# Patient Record
Sex: FEMALE | Race: OTHER | Employment: OTHER | URBAN - METROPOLITAN AREA
[De-identification: names, ages, dates, MRNs, and addresses within clinical notes are randomized per-mention and may not be internally consistent; named-entity substitution may affect disease eponyms.]

---

## 2019-01-28 ENCOUNTER — APPOINTMENT (OUTPATIENT)
Dept: LAB | Age: 69
End: 2019-01-28
Attending: INTERNAL MEDICINE
Payer: MEDICARE

## 2019-01-28 ENCOUNTER — OFFICE VISIT (OUTPATIENT)
Dept: INTERNAL MEDICINE CLINIC | Facility: CLINIC | Age: 69
End: 2019-01-28
Payer: MEDICARE

## 2019-01-28 VITALS
RESPIRATION RATE: 12 BRPM | HEART RATE: 70 BPM | TEMPERATURE: 98 F | WEIGHT: 147 LBS | OXYGEN SATURATION: 94 % | DIASTOLIC BLOOD PRESSURE: 80 MMHG | SYSTOLIC BLOOD PRESSURE: 160 MMHG | BODY MASS INDEX: 29.64 KG/M2 | HEIGHT: 59 IN

## 2019-01-28 DIAGNOSIS — E11.69 MIXED HYPERLIPIDEMIA DUE TO TYPE 2 DIABETES MELLITUS (HCC): ICD-10-CM

## 2019-01-28 DIAGNOSIS — H93.13 TINNITUS AURIUM, BILATERAL: ICD-10-CM

## 2019-01-28 DIAGNOSIS — Z13.5 SCREENING FOR DIABETIC RETINOPATHY: ICD-10-CM

## 2019-01-28 DIAGNOSIS — Z79.4 INSULIN DEPENDENT TYPE 2 DIABETES MELLITUS, UNCONTROLLED (HCC): ICD-10-CM

## 2019-01-28 DIAGNOSIS — F32.1 CURRENT MODERATE EPISODE OF MAJOR DEPRESSIVE DISORDER WITHOUT PRIOR EPISODE (HCC): ICD-10-CM

## 2019-01-28 DIAGNOSIS — I15.2 HYPERTENSION ASSOCIATED WITH DIABETES (HCC): ICD-10-CM

## 2019-01-28 DIAGNOSIS — E11.59 HYPERTENSION ASSOCIATED WITH DIABETES (HCC): ICD-10-CM

## 2019-01-28 DIAGNOSIS — E11.65 INSULIN DEPENDENT TYPE 2 DIABETES MELLITUS, UNCONTROLLED (HCC): ICD-10-CM

## 2019-01-28 DIAGNOSIS — E78.2 MIXED HYPERLIPIDEMIA DUE TO TYPE 2 DIABETES MELLITUS (HCC): ICD-10-CM

## 2019-01-28 DIAGNOSIS — Z79.4 INSULIN DEPENDENT TYPE 2 DIABETES MELLITUS, UNCONTROLLED (HCC): Primary | ICD-10-CM

## 2019-01-28 DIAGNOSIS — E11.65 INSULIN DEPENDENT TYPE 2 DIABETES MELLITUS, UNCONTROLLED (HCC): Primary | ICD-10-CM

## 2019-01-28 PROBLEM — E66.3 OVERWEIGHT (BMI 25.0-29.9): Status: ACTIVE | Noted: 2019-01-28

## 2019-01-28 PROBLEM — IMO0002 INSULIN DEPENDENT TYPE 2 DIABETES MELLITUS, UNCONTROLLED: Status: ACTIVE | Noted: 2019-01-28

## 2019-01-28 PROBLEM — K76.0 NAFLD (NONALCOHOLIC FATTY LIVER DISEASE): Status: ACTIVE | Noted: 2019-01-28

## 2019-01-28 LAB
ALT SERPL-CCNC: 41 U/L (ref 14–54)
ANION GAP SERPL CALC-SCNC: 9 MMOL/L (ref 0–18)
AST SERPL-CCNC: 42 U/L (ref 15–41)
BUN BLD-MCNC: 14 MG/DL (ref 8–20)
BUN/CREAT SERPL: 17.1 (ref 10–20)
CALCIUM BLD-MCNC: 9.5 MG/DL (ref 8.3–10.3)
CARTRIDGE LOT#: 931 NUMERIC
CHLORIDE SERPL-SCNC: 104 MMOL/L (ref 101–111)
CHOLEST SMN-MCNC: 227 MG/DL (ref ?–200)
CO2 SERPL-SCNC: 24 MMOL/L (ref 22–32)
CREAT BLD-MCNC: 0.82 MG/DL (ref 0.55–1.02)
CREAT UR-SCNC: 29.8 MG/DL
EST. AVERAGE GLUCOSE BLD GHB EST-MCNC: 235 MG/DL (ref 68–126)
GLUCOSE BLD-MCNC: 295 MG/DL (ref 70–99)
HBA1C MFR BLD HPLC: 9.8 % (ref ?–5.7)
HDLC SERPL-MCNC: 63 MG/DL (ref 40–59)
HEMOGLOBIN A1C: 9.2 % (ref 4.3–5.6)
LDLC SERPL CALC-MCNC: 105 MG/DL (ref ?–100)
MICROALBUMIN UR-MCNC: 33.3 MG/DL
MICROALBUMIN/CREAT 24H UR-RTO: 1117.4 UG/MG (ref ?–30)
NONHDLC SERPL-MCNC: 164 MG/DL (ref ?–130)
OSMOLALITY SERPL CALC.SUM OF ELEC: 295 MOSM/KG (ref 275–295)
POTASSIUM SERPL-SCNC: 4 MMOL/L (ref 3.6–5.1)
SODIUM SERPL-SCNC: 137 MMOL/L (ref 136–144)
T4 FREE SERPL-MCNC: 1 NG/DL (ref 0.9–1.8)
TRIGL SERPL-MCNC: 294 MG/DL (ref 30–149)
TSI SER-ACNC: 2.72 MIU/ML (ref 0.35–5.5)
VLDLC SERPL CALC-MCNC: 59 MG/DL (ref 0–30)

## 2019-01-28 PROCEDURE — 84460 ALANINE AMINO (ALT) (SGPT): CPT

## 2019-01-28 PROCEDURE — 80061 LIPID PANEL: CPT

## 2019-01-28 PROCEDURE — 84450 TRANSFERASE (AST) (SGOT): CPT

## 2019-01-28 PROCEDURE — 80048 BASIC METABOLIC PNL TOTAL CA: CPT

## 2019-01-28 PROCEDURE — 83036 HEMOGLOBIN GLYCOSYLATED A1C: CPT

## 2019-01-28 PROCEDURE — 84439 ASSAY OF FREE THYROXINE: CPT

## 2019-01-28 PROCEDURE — 82043 UR ALBUMIN QUANTITATIVE: CPT

## 2019-01-28 PROCEDURE — 99204 OFFICE O/P NEW MOD 45 MIN: CPT | Performed by: INTERNAL MEDICINE

## 2019-01-28 PROCEDURE — 84443 ASSAY THYROID STIM HORMONE: CPT

## 2019-01-28 PROCEDURE — 83036 HEMOGLOBIN GLYCOSYLATED A1C: CPT | Performed by: INTERNAL MEDICINE

## 2019-01-28 PROCEDURE — 82570 ASSAY OF URINE CREATININE: CPT

## 2019-01-28 PROCEDURE — 36415 COLL VENOUS BLD VENIPUNCTURE: CPT

## 2019-01-28 RX ORDER — ATORVASTATIN CALCIUM 20 MG/1
20 TABLET, FILM COATED ORAL DAILY
COMMUNITY
End: 2019-02-22

## 2019-01-28 NOTE — PATIENT INSTRUCTIONS
Por favor zora a la Ayah. Gwen Ahmed para un examen de la vista para diabéticos. Por favor complete los laboratorios de ayuno naylor pronto sari sea posible.     Para thompson depresión, comenzaremos a duane sertralina 50 mg (media tableta) diariamente pal 7

## 2019-01-28 NOTE — PROGRESS NOTES
Mary Dumont is a 80year old female. HPI:   Patient presents for new patietn visit. She was following with PCP Raj Caro. Comes in with her daughter, Mildred Santiago. Mildred Santiago lives with patient.  30 minutes into visit, Chuy Lacy left and daughter Concepcion Bassett unspecified type diabetes mellitus without mention of complication, not stated as uncontrolled    • Unspecified essential hypertension       Past Surgical History:   Procedure Laterality Date   • HYSTERECTOMY  1998      Social History:    Social History uncontrolled. If still elevated at next visit, I will titrate up on her losartan  # HLP: continue atorvastatin, check lipids now  # Moderate MDD: discussed counseling, lifestyle interventions, and sertraline. Side effects/benefits of medication discussed.

## 2019-02-04 ENCOUNTER — TELEPHONE (OUTPATIENT)
Dept: INTERNAL MEDICINE CLINIC | Facility: CLINIC | Age: 69
End: 2019-02-04

## 2019-02-05 NOTE — TELEPHONE ENCOUNTER
Does she want me to order [facility] [place] and metformin as separate tablets or janumet (combination tablet)? I am not sure if her insurance will cover the combination but we can try to send.

## 2019-02-06 NOTE — TELEPHONE ENCOUNTER
Pharm called for clarification on med Lancets. Pharm advised that they called last week about same med. Please call to discuss.

## 2019-02-15 ENCOUNTER — TELEPHONE (OUTPATIENT)
Dept: INTERNAL MEDICINE CLINIC | Facility: CLINIC | Age: 69
End: 2019-02-15

## 2019-02-22 RX ORDER — BLOOD SUGAR DIAGNOSTIC
STRIP MISCELLANEOUS
Qty: 300 STRIP | Refills: 3 | Status: SHIPPED | OUTPATIENT
Start: 2019-02-22 | End: 2019-08-09

## 2019-02-22 RX ORDER — BLOOD GLUCOSE CONTROL HIGH,LOW
EACH MISCELLANEOUS
Qty: 1 EACH | Refills: 0 | Status: SHIPPED | OUTPATIENT
Start: 2019-02-22

## 2019-02-22 RX ORDER — LANCETS
1 EACH MISCELLANEOUS 3 TIMES DAILY
Qty: 300 EACH | Refills: 3 | Status: SHIPPED | OUTPATIENT
Start: 2019-02-22 | End: 2019-06-10

## 2019-02-22 RX ORDER — BLOOD-GLUCOSE METER
1 EACH MISCELLANEOUS 2 TIMES DAILY
Qty: 1 KIT | Refills: 0 | Status: SHIPPED | OUTPATIENT
Start: 2019-02-22 | End: 2020-02-22

## 2019-02-22 RX ORDER — ATORVASTATIN CALCIUM 20 MG/1
20 TABLET, FILM COATED ORAL DAILY
Qty: 90 TABLET | Refills: 0 | Status: SHIPPED | OUTPATIENT
Start: 2019-02-22 | End: 2019-08-09

## 2019-02-22 NOTE — TELEPHONE ENCOUNTER
Protocol passed.      Medication(s) to Refill:   Requested Prescriptions     Pending Prescriptions Disp Refills   • Blood Glucose Monitoring Suppl (ACCU-CHEK MAME PLUS) w/Device Does not apply Kit 1 kit 0     Si Device by Other route 2 (two) times slim CHOLEST 227 (H) 01/28/2019    TRIG 294 (H) 01/28/2019    HDL 63 (H) 01/28/2019     (H) 01/28/2019    VLDL 59 (H) 01/28/2019    TCHDLRATIO 3.7 08/14/2013    NONHDLC 164 (H) 01/28/2019    CHOLHDLRATIO 3.4 07/18/2011

## 2019-02-27 PROBLEM — E11.3519 PROLIFERATIVE DIABETIC RETINOPATHY WITH MACULAR EDEMA ASSOCIATED WITH TYPE 2 DIABETES MELLITUS (HCC): Status: ACTIVE | Noted: 2019-02-27

## 2019-03-01 DIAGNOSIS — E11.3511 DIABETIC MACULAR EDEMA OF RIGHT EYE WITH PROLIFERATIVE RETINOPATHY ASSOCIATED WITH TYPE 2 DIABETES MELLITUS (HCC): Primary | ICD-10-CM

## 2019-03-01 DIAGNOSIS — H17.89 OTHER CORNEAL SCARS AND OPACITIES: ICD-10-CM

## 2019-03-01 DIAGNOSIS — H43.13 VITREOUS HEMORRHAGE OF BOTH EYES (HCC): ICD-10-CM

## 2019-03-05 ENCOUNTER — TELEPHONE (OUTPATIENT)
Dept: INTERNAL MEDICINE CLINIC | Facility: CLINIC | Age: 69
End: 2019-03-05

## 2019-03-05 NOTE — TELEPHONE ENCOUNTER
Andres Hernandez plus was sent to them but need to verify instructions for this because they received 2

## 2019-05-03 NOTE — TELEPHONE ENCOUNTER
NARGIS [address] [place] [address] [address] [place], [place] - [address] [phone], [phone]

## 2019-05-06 NOTE — TELEPHONE ENCOUNTER
Patient was supposed to see me in 3/2019. She was a No SHOW. I had blocked her for 1 hour because she is an uncontrolled diabetic and needs medicare wellness exam. Please ask them to schedule.  If she No Shows me again, I will not allow her to see me in fut

## 2019-05-06 NOTE — TELEPHONE ENCOUNTER
Refill requested:   Requested Prescriptions     Pending Prescriptions Disp Refills   • SITagliptin-metFORMIN HCl (DOEUMET)  MG Oral Tab 180 tablet 0     Sig: Take 1 tablet by mouth 2 (two) times daily with meals.    Failed protocol  Diabetic Medicati

## 2019-05-10 NOTE — TELEPHONE ENCOUNTER
Pt would like a call back from nurse states that she is still a pt here and would like a refill on JANUMET

## 2019-05-13 ENCOUNTER — TELEPHONE (OUTPATIENT)
Dept: INTERNAL MEDICINE CLINIC | Facility: CLINIC | Age: 69
End: 2019-05-13

## 2019-05-13 DIAGNOSIS — E11.65 INSULIN DEPENDENT TYPE 2 DIABETES MELLITUS, UNCONTROLLED (HCC): Primary | ICD-10-CM

## 2019-05-13 DIAGNOSIS — Z79.4 INSULIN DEPENDENT TYPE 2 DIABETES MELLITUS, UNCONTROLLED (HCC): Primary | ICD-10-CM

## 2019-05-16 NOTE — TELEPHONE ENCOUNTER
Patient's Daughter, Ramon Bean, calling in requesting samples for Janument . She stated the RX was sent to the [facility] and it will take a week for delivery.       Please callNatalie Armando @ [phone]

## 2019-06-10 ENCOUNTER — APPOINTMENT (OUTPATIENT)
Dept: LAB | Age: 69
End: 2019-06-10
Attending: INTERNAL MEDICINE
Payer: MEDICARE

## 2019-06-10 ENCOUNTER — OFFICE VISIT (OUTPATIENT)
Dept: INTERNAL MEDICINE CLINIC | Facility: CLINIC | Age: 69
End: 2019-06-10
Payer: MEDICARE

## 2019-06-10 VITALS
BODY MASS INDEX: 29.52 KG/M2 | HEIGHT: 58.5 IN | RESPIRATION RATE: 12 BRPM | DIASTOLIC BLOOD PRESSURE: 70 MMHG | HEART RATE: 78 BPM | WEIGHT: 144.5 LBS | TEMPERATURE: 98 F | SYSTOLIC BLOOD PRESSURE: 142 MMHG | OXYGEN SATURATION: 99 %

## 2019-06-10 DIAGNOSIS — Z12.11 SCREEN FOR COLON CANCER: ICD-10-CM

## 2019-06-10 DIAGNOSIS — E11.65 UNCONTROLLED TYPE 2 DIABETES MELLITUS WITH HYPERGLYCEMIA (HCC): ICD-10-CM

## 2019-06-10 DIAGNOSIS — M94.9 DISORDER OF BONE AND CARTILAGE: ICD-10-CM

## 2019-06-10 DIAGNOSIS — E11.65 UNCONTROLLED TYPE 2 DIABETES MELLITUS WITH HYPERGLYCEMIA (HCC): Primary | ICD-10-CM

## 2019-06-10 DIAGNOSIS — K76.0 NAFLD (NONALCOHOLIC FATTY LIVER DISEASE): ICD-10-CM

## 2019-06-10 DIAGNOSIS — E78.2 MIXED HYPERLIPIDEMIA DUE TO TYPE 2 DIABETES MELLITUS (HCC): ICD-10-CM

## 2019-06-10 DIAGNOSIS — E11.59 HYPERTENSION ASSOCIATED WITH DIABETES (HCC): ICD-10-CM

## 2019-06-10 DIAGNOSIS — M89.9 DISORDER OF BONE AND CARTILAGE: ICD-10-CM

## 2019-06-10 DIAGNOSIS — E11.65 INSULIN DEPENDENT TYPE 2 DIABETES MELLITUS, UNCONTROLLED (HCC): ICD-10-CM

## 2019-06-10 DIAGNOSIS — I15.2 HYPERTENSION ASSOCIATED WITH DIABETES (HCC): ICD-10-CM

## 2019-06-10 DIAGNOSIS — E11.69 MIXED HYPERLIPIDEMIA DUE TO TYPE 2 DIABETES MELLITUS (HCC): ICD-10-CM

## 2019-06-10 DIAGNOSIS — Z79.4 INSULIN DEPENDENT TYPE 2 DIABETES MELLITUS, UNCONTROLLED (HCC): ICD-10-CM

## 2019-06-10 DIAGNOSIS — E11.3513 PROLIFERATIVE DIABETIC RETINOPATHY OF BOTH EYES WITH MACULAR EDEMA ASSOCIATED WITH TYPE 2 DIABETES MELLITUS (HCC): ICD-10-CM

## 2019-06-10 DIAGNOSIS — E66.3 OVERWEIGHT (BMI 25.0-29.9): ICD-10-CM

## 2019-06-10 DIAGNOSIS — Z00.00 ROUTINE GENERAL MEDICAL EXAMINATION AT A HEALTH CARE FACILITY: ICD-10-CM

## 2019-06-10 DIAGNOSIS — Z12.39 SCREENING FOR BREAST CANCER: ICD-10-CM

## 2019-06-10 DIAGNOSIS — F39 MOOD DISORDER (HCC): ICD-10-CM

## 2019-06-10 PROBLEM — E11.29 DIABETES MELLITUS WITH ALBUMINURIA (HCC): Status: ACTIVE | Noted: 2019-01-28

## 2019-06-10 PROBLEM — R80.9 DIABETES MELLITUS WITH ALBUMINURIA  (HCC): Status: ACTIVE | Noted: 2019-01-28

## 2019-06-10 PROBLEM — E11.29 DIABETES MELLITUS WITH ALBUMINURIA  (HCC): Status: ACTIVE | Noted: 2019-01-28

## 2019-06-10 PROBLEM — R80.9 DIABETES MELLITUS WITH ALBUMINURIA: Status: ACTIVE | Noted: 2019-01-28

## 2019-06-10 PROBLEM — R80.9 DIABETES MELLITUS WITH ALBUMINURIA (HCC): Status: ACTIVE | Noted: 2019-01-28

## 2019-06-10 PROBLEM — E11.29 DIABETES MELLITUS WITH ALBUMINURIA: Status: ACTIVE | Noted: 2019-01-28

## 2019-06-10 PROBLEM — IMO0002 UNCONTROLLED TYPE 2 DIABETES MELLITUS: Status: ACTIVE | Noted: 2019-06-10

## 2019-06-10 PROCEDURE — 36415 COLL VENOUS BLD VENIPUNCTURE: CPT

## 2019-06-10 PROCEDURE — 83036 HEMOGLOBIN GLYCOSYLATED A1C: CPT | Performed by: INTERNAL MEDICINE

## 2019-06-10 PROCEDURE — G0402 INITIAL PREVENTIVE EXAM: HCPCS | Performed by: INTERNAL MEDICINE

## 2019-06-10 PROCEDURE — 86803 HEPATITIS C AB TEST: CPT

## 2019-06-10 PROCEDURE — 99397 PER PM REEVAL EST PAT 65+ YR: CPT | Performed by: INTERNAL MEDICINE

## 2019-06-10 PROCEDURE — 80076 HEPATIC FUNCTION PANEL: CPT

## 2019-06-10 PROCEDURE — 96160 PT-FOCUSED HLTH RISK ASSMT: CPT | Performed by: INTERNAL MEDICINE

## 2019-06-10 PROCEDURE — 80048 BASIC METABOLIC PNL TOTAL CA: CPT

## 2019-06-10 RX ORDER — LOSARTAN POTASSIUM 100 MG/1
100 TABLET ORAL DAILY
Qty: 90 TABLET | Refills: 3 | Status: SHIPPED | OUTPATIENT
Start: 2019-06-10 | End: 2019-08-09

## 2019-06-10 RX ORDER — LANCETS
1 EACH MISCELLANEOUS 3 TIMES DAILY
Qty: 300 EACH | Refills: 11 | Status: SHIPPED | OUTPATIENT
Start: 2019-06-10 | End: 2020-06-09

## 2019-06-10 RX ORDER — PEN NEEDLE, DIABETIC 30 GX3/16"
1 NEEDLE, DISPOSABLE MISCELLANEOUS DAILY
Qty: 90 EACH | Refills: 0 | Status: SHIPPED | OUTPATIENT
Start: 2019-06-10 | End: 2019-06-13 | Stop reason: CLARIF

## 2019-06-10 NOTE — PROGRESS NOTES
Kathy Juares is a 80year old female. HPI:   Patient presents for medicare supervisit and additional issues noted below. Comes in with daughter, Charly Castillo. 1. Uncontrolled DM: she does not exercise.  She is using insulin 22-40 units daily depending o SURGICAL HISTORY Right     right knee repair but not a replacement      Social History:    Social History    Tobacco Use      Smoking status: Never Smoker      Smokeless tobacco: Never Used    Alcohol use: Yes      Comment: 1-2 drinks per month    Drug use edema b/l: following with ophtho, needs improved glycemic control  # HTN in T2 DM: increase losartan to 100 mg dialy   # HLP: continue atorvastatin, LDL slightly above goal. Consider increasing if not improving with set of next labs.    # Mood Disorder: aga

## 2019-06-10 NOTE — PATIENT INSTRUCTIONS
Le aconsejo que se aplique la vacuna TDAP (tétanos, difteria, pertusis) cada 10 años, shea puede costar [employer] 65. También te aconsejo que te pongas la vacuna [employer] shamir vez en la megan.  [place]inson es NUEVO y se considera mejor que la vacuna contra la culeb

## 2019-06-13 ENCOUNTER — TELEPHONE (OUTPATIENT)
Dept: INTERNAL MEDICINE CLINIC | Facility: CLINIC | Age: 69
End: 2019-06-13

## 2019-06-13 RX ORDER — ELECTROLYTES/DEXTROSE
SOLUTION, ORAL ORAL
Qty: 100 EACH | Refills: 0 | Status: SHIPPED | OUTPATIENT
Start: 2019-06-13 | End: 2020-04-09

## 2019-06-18 ENCOUNTER — TELEPHONE (OUTPATIENT)
Dept: INTERNAL MEDICINE CLINIC | Facility: CLINIC | Age: 69
End: 2019-06-18

## 2019-06-18 NOTE — TELEPHONE ENCOUNTER
Patient's daughter dropped off sheet from [address] [place] [address] MD ph [phone] fax [phone].   Patient needs referral for this specialist see testing sheet/MA fax folder

## 2019-06-19 NOTE — TELEPHONE ENCOUNTER
Referral placed and authorized through pts insurance. Faxed referral to Dr Jeremiah Bernal office at [phone] and confirmation received.

## 2019-07-30 NOTE — TELEPHONE ENCOUNTER
JANUMET  MG     Last OV relevant to medication: 6/10/2019     Last refill date: 5/16/2019    When pt was asked to return for OV: 1 month     Upcoming appt/reason: none     Labs: 6- ( hemoglobin a1c, bmp, hcv )       ASSESSMENT AND PLAN:   # U

## 2019-07-31 RX ORDER — SITAGLIPTIN AND METFORMIN HYDROCHLORIDE 50; 1000 MG/1; MG/1
TABLET, FILM COATED ORAL
Qty: 180 TABLET | Refills: 0 | OUTPATIENT
Start: 2019-07-31

## 2019-08-01 NOTE — TELEPHONE ENCOUNTER
Patient scheduled appointment.  Requesting refill prior to then    Future Appointments   Date Time Provider Kwan Carmichael   8/9/2019  9:30 AM Junior Hernandez MD [facility] [place]

## 2019-08-09 ENCOUNTER — OFFICE VISIT (OUTPATIENT)
Dept: INTERNAL MEDICINE CLINIC | Facility: CLINIC | Age: 69
End: 2019-08-09
Payer: MEDICARE

## 2019-08-09 VITALS
WEIGHT: 143.25 LBS | OXYGEN SATURATION: 96 % | SYSTOLIC BLOOD PRESSURE: 130 MMHG | RESPIRATION RATE: 14 BRPM | HEART RATE: 74 BPM | TEMPERATURE: 98 F | BODY MASS INDEX: 29.27 KG/M2 | HEIGHT: 58.5 IN | DIASTOLIC BLOOD PRESSURE: 62 MMHG

## 2019-08-09 DIAGNOSIS — E11.69 HYPERLIPIDEMIA ASSOCIATED WITH TYPE 2 DIABETES MELLITUS (HCC): ICD-10-CM

## 2019-08-09 DIAGNOSIS — E11.65 UNCONTROLLED TYPE 2 DIABETES MELLITUS WITH HYPERGLYCEMIA (HCC): Primary | ICD-10-CM

## 2019-08-09 DIAGNOSIS — E11.59 HYPERTENSION ASSOCIATED WITH DIABETES (HCC): ICD-10-CM

## 2019-08-09 DIAGNOSIS — R01.1 SYSTOLIC MURMUR: ICD-10-CM

## 2019-08-09 DIAGNOSIS — E78.5 HYPERLIPIDEMIA ASSOCIATED WITH TYPE 2 DIABETES MELLITUS (HCC): ICD-10-CM

## 2019-08-09 DIAGNOSIS — I15.2 HYPERTENSION ASSOCIATED WITH DIABETES (HCC): ICD-10-CM

## 2019-08-09 PROCEDURE — 99214 OFFICE O/P EST MOD 30 MIN: CPT | Performed by: INTERNAL MEDICINE

## 2019-08-09 RX ORDER — TIZANIDINE 4 MG/1
TABLET ORAL
COMMUNITY
End: 2019-08-09

## 2019-08-09 RX ORDER — PEN NEEDLE, DIABETIC 29 G X1/2"
NEEDLE, DISPOSABLE MISCELLANEOUS
Refills: 11 | COMMUNITY
Start: 2019-06-10 | End: 2019-08-09

## 2019-08-09 RX ORDER — OXYCODONE AND ACETAMINOPHEN 10; 325 MG/1; MG/1
TABLET ORAL
COMMUNITY
End: 2019-08-09

## 2019-08-09 RX ORDER — ATORVASTATIN CALCIUM 20 MG/1
20 TABLET, FILM COATED ORAL DAILY
Qty: 90 TABLET | Refills: 3 | Status: SHIPPED | OUTPATIENT
Start: 2019-08-09 | End: 2020-12-11

## 2019-08-09 RX ORDER — LOSARTAN POTASSIUM 100 MG/1
100 TABLET ORAL DAILY
Qty: 90 TABLET | Refills: 3 | Status: SHIPPED | OUTPATIENT
Start: 2019-08-09 | End: 2020-12-11

## 2019-08-09 RX ORDER — MELOXICAM 15 MG/1
TABLET ORAL
COMMUNITY
End: 2019-08-09

## 2019-08-09 RX ORDER — TRIAMCINOLONE ACETONIDE 0.25 MG/G
OINTMENT TOPICAL
COMMUNITY
End: 2019-08-09

## 2019-08-09 RX ORDER — BLOOD SUGAR DIAGNOSTIC
STRIP MISCELLANEOUS
Qty: 300 STRIP | Refills: 11 | Status: SHIPPED | OUTPATIENT
Start: 2019-08-09 | End: 2020-04-30

## 2019-08-09 RX ORDER — LOSARTAN POTASSIUM 50 MG/1
TABLET ORAL
COMMUNITY
End: 2019-08-09

## 2019-08-09 RX ORDER — ATORVASTATIN CALCIUM 20 MG/1
TABLET, FILM COATED ORAL
COMMUNITY
End: 2019-08-09

## 2019-08-09 NOTE — PATIENT INSTRUCTIONS
Reanude toujeo insulin 30 [employer] al día. Llame a la programación central al [phone]. He ordenado shamir ecografía de thompson corazón porque hoy escuché un murmullo pal thompson examen.     Mida thompson nivel de azúcar en michelle en ayunas (en la mañ

## 2019-08-09 NOTE — PROGRESS NOTES
Buddy Rodney is a 77year old female. HPI:   Patient presents for uncontrolled DM.  ID [id number]  1. DM: has not used toujeou since7/23. She forgot to take needles to inject herself while in [facility]. She was in [place] from 7/14-7/27th.  She has a replacement      Social History:    Social History    Tobacco Use      Smoking status: Never Smoker      Smokeless tobacco: Never Used    Alcohol use: Yes      Comment: 1-2 drinks per month    Drug use: No            EXAM:   /62   Pulse 74   Temp 9 HLP:  Atorvastatin refilled, LDL slightly above goal. Consider increasing if not improving with set of next labs. # Mood Disorder: again discussed counseling, lifestyle interventions, medications.  She declines both counseling and medications at this time

## 2019-08-16 ENCOUNTER — APPOINTMENT (OUTPATIENT)
Dept: LAB | Age: 69
End: 2019-08-16
Attending: INTERNAL MEDICINE
Payer: MEDICARE

## 2019-08-16 DIAGNOSIS — E11.65 INSULIN DEPENDENT TYPE 2 DIABETES MELLITUS, UNCONTROLLED (HCC): ICD-10-CM

## 2019-08-16 DIAGNOSIS — Z79.4 INSULIN DEPENDENT TYPE 2 DIABETES MELLITUS, UNCONTROLLED (HCC): ICD-10-CM

## 2019-08-16 LAB
EST. AVERAGE GLUCOSE BLD GHB EST-MCNC: 223 MG/DL (ref 68–126)
HBA1C MFR BLD HPLC: 9.4 % (ref ?–5.7)

## 2019-08-16 PROCEDURE — 36415 COLL VENOUS BLD VENIPUNCTURE: CPT

## 2019-08-16 PROCEDURE — 83036 HEMOGLOBIN GLYCOSYLATED A1C: CPT

## 2019-08-18 DIAGNOSIS — R80.9 DIABETES MELLITUS WITH ALBUMINURIA (HCC): ICD-10-CM

## 2019-08-18 DIAGNOSIS — E11.65 INSULIN DEPENDENT TYPE 2 DIABETES MELLITUS, UNCONTROLLED (HCC): ICD-10-CM

## 2019-08-18 DIAGNOSIS — E11.29 DIABETES MELLITUS WITH ALBUMINURIA (HCC): ICD-10-CM

## 2019-08-18 DIAGNOSIS — Z79.4 INSULIN DEPENDENT TYPE 2 DIABETES MELLITUS, UNCONTROLLED (HCC): ICD-10-CM

## 2019-08-18 DIAGNOSIS — E11.65 UNCONTROLLED TYPE 2 DIABETES MELLITUS WITH HYPERGLYCEMIA (HCC): Primary | ICD-10-CM

## 2019-08-20 ENCOUNTER — TELEPHONE (OUTPATIENT)
Dept: INTERNAL MEDICINE CLINIC | Facility: CLINIC | Age: 69
End: 2019-08-20

## 2019-08-20 NOTE — TELEPHONE ENCOUNTER
Patient dropped off blood sugar readings. Dr. Brock Harley states the followin. Have patient up her Toujeo by 2 units in am, and pm until her FBS avg is less than 140.    2. Make sure she schedules with Deisy Carbajal.     Patient is Romanian speaking

## 2019-08-27 ENCOUNTER — HOSPITAL ENCOUNTER (OUTPATIENT)
Dept: MAMMOGRAPHY | Age: 69
Discharge: HOME | End: 2019-08-27
Attending: INTERNAL MEDICINE
Payer: MEDICARE

## 2019-08-27 ENCOUNTER — HOSPITAL ENCOUNTER (OUTPATIENT)
Dept: CV DIAGNOSTICS | Age: 69
Discharge: HOME | End: 2019-08-27
Attending: INTERNAL MEDICINE
Payer: MEDICARE

## 2019-08-27 DIAGNOSIS — Z12.39 SCREENING FOR BREAST CANCER: ICD-10-CM

## 2019-08-27 DIAGNOSIS — R01.1 SYSTOLIC MURMUR: ICD-10-CM

## 2019-08-27 PROCEDURE — 93306 TTE W/DOPPLER COMPLETE: CPT | Performed by: INTERNAL MEDICINE

## 2019-08-27 PROCEDURE — 77063 BREAST TOMOSYNTHESIS BI: CPT | Performed by: INTERNAL MEDICINE

## 2019-08-27 PROCEDURE — 77067 SCR MAMMO BI INCL CAD: CPT | Performed by: INTERNAL MEDICINE

## 2019-09-24 NOTE — TELEPHONE ENCOUNTER
insulin glargine (TOUJEO SOLOSTAR) 300 UNIT/ML Subcutaneous Solution Pen-injector    Last OV relevant to medication: 8/9/2019    Last refill date: 2/22/2019 # 3 ml with 0 refills     When pt was asked to return for OV: 6 weeks     Upcoming appt/reason: non

## 2019-09-24 NOTE — TELEPHONE ENCOUNTER
Pt's daughter informed Misty Wise per HIPPA) and verbalized understanding by witting instructions. Diabetes specialist info given as well.

## 2019-11-13 ENCOUNTER — OFFICE VISIT (OUTPATIENT)
Dept: INTERNAL MEDICINE CLINIC | Facility: CLINIC | Age: 69
End: 2019-11-13
Payer: MEDICARE

## 2019-11-13 ENCOUNTER — TELEPHONE (OUTPATIENT)
Dept: INTERNAL MEDICINE CLINIC | Facility: CLINIC | Age: 69
End: 2019-11-13

## 2019-11-13 VITALS
DIASTOLIC BLOOD PRESSURE: 80 MMHG | OXYGEN SATURATION: 98 % | RESPIRATION RATE: 16 BRPM | TEMPERATURE: 99 F | SYSTOLIC BLOOD PRESSURE: 170 MMHG | BODY MASS INDEX: 30.03 KG/M2 | HEART RATE: 86 BPM | HEIGHT: 58.5 IN | WEIGHT: 147 LBS

## 2019-11-13 DIAGNOSIS — I15.2 HYPERTENSION ASSOCIATED WITH DIABETES (HCC): ICD-10-CM

## 2019-11-13 DIAGNOSIS — Z23 INFLUENZA VACCINE NEEDED: Primary | ICD-10-CM

## 2019-11-13 DIAGNOSIS — E11.59 HYPERTENSION ASSOCIATED WITH DIABETES (HCC): ICD-10-CM

## 2019-11-13 DIAGNOSIS — E11.65 INSULIN DEPENDENT TYPE 2 DIABETES MELLITUS, UNCONTROLLED (HCC): ICD-10-CM

## 2019-11-13 DIAGNOSIS — E11.69 HYPERLIPIDEMIA ASSOCIATED WITH TYPE 2 DIABETES MELLITUS (HCC): ICD-10-CM

## 2019-11-13 DIAGNOSIS — E78.5 HYPERLIPIDEMIA ASSOCIATED WITH TYPE 2 DIABETES MELLITUS (HCC): ICD-10-CM

## 2019-11-13 DIAGNOSIS — F33.1 MDD (MAJOR DEPRESSIVE DISORDER), RECURRENT EPISODE, MODERATE (HCC): ICD-10-CM

## 2019-11-13 DIAGNOSIS — Z79.4 INSULIN DEPENDENT TYPE 2 DIABETES MELLITUS, UNCONTROLLED (HCC): ICD-10-CM

## 2019-11-13 DIAGNOSIS — H65.02 NON-RECURRENT ACUTE SEROUS OTITIS MEDIA OF LEFT EAR: ICD-10-CM

## 2019-11-13 PROCEDURE — 90662 IIV NO PRSV INCREASED AG IM: CPT | Performed by: INTERNAL MEDICINE

## 2019-11-13 PROCEDURE — 99214 OFFICE O/P EST MOD 30 MIN: CPT | Performed by: INTERNAL MEDICINE

## 2019-11-13 PROCEDURE — G0008 ADMIN INFLUENZA VIRUS VAC: HCPCS | Performed by: INTERNAL MEDICINE

## 2019-11-13 RX ORDER — DOXYCYCLINE HYCLATE 100 MG/1
100 CAPSULE ORAL 2 TIMES DAILY
Qty: 14 CAPSULE | Refills: 0 | Status: SHIPPED | OUTPATIENT
Start: 2019-11-13 | End: 2020-09-02

## 2019-11-13 RX ORDER — HYDROCHLOROTHIAZIDE 25 MG/1
25 TABLET ORAL DAILY
Qty: 90 TABLET | Refills: 3 | Status: SHIPPED | OUTPATIENT
Start: 2019-11-13 | End: 2020-05-11

## 2019-11-13 NOTE — TELEPHONE ENCOUNTER
Eleni Lainez that referral #[id number] is valid for [employer]. Kae Brooks stated he will contact pt to schedule appointment.

## 2019-11-13 NOTE — PATIENT INSTRUCTIONS
Me gustaría comenzar cualquiera de los siguientes medicamentos para thompson diabetes:  Ozempic shamir vez por semana  Trulicity shamir vez por semana    Para thompson presión arterial kelsie, continúe con losartán y AGREGUE hidroclorotiazida 25 mg cada mañana.     Por favor t

## 2019-11-13 NOTE — PROGRESS NOTES
Vicenta Lewis is a 77year old female. HPI:   Patient presents for DM and additional issues. Comes in with daughter Laney Saldana. 1. Uncontrolled DM: she never sent me her log of blood sugars. She is measuring her FBS only.  States her blood sugars are v above.        Wt Readings from Last 6 Encounters:  11/13/19 : 147 lb (66.7 kg)  08/09/19 : 143 lb 4 oz (65 kg)  06/10/19 : 144 lb 8 oz (65.5 kg)  01/28/19 : 147 lb (66.7 kg)  05/14/14 : 148 lb (67.1 kg)  08/22/13 : 146 lb (66.2 kg)        Penicillins medications (does not want to try b/c feels she is already on too much stuff), counseling (feels she does not have time for it), and lifestyle interventions (low motivation, ambivalent).  However, her daughter asked me for Polish speaking counselors and sh she feels she is coping well with stress  Indication for ASA (50-55 yo): her 10 year ASCVD score is 20%. Research for initiating ASA therapy is not strong in her age. Will continue to discuss.    Colon Cancer Screening: referred for colonoscopy, she has nev

## 2019-12-04 ENCOUNTER — OFFICE VISIT (OUTPATIENT)
Dept: ENDOCRINOLOGY CLINIC | Facility: CLINIC | Age: 69
End: 2019-12-04
Payer: MEDICARE

## 2019-12-04 VITALS
DIASTOLIC BLOOD PRESSURE: 60 MMHG | WEIGHT: 147 LBS | HEIGHT: 58.5 IN | HEART RATE: 80 BPM | BODY MASS INDEX: 30.03 KG/M2 | SYSTOLIC BLOOD PRESSURE: 120 MMHG

## 2019-12-04 DIAGNOSIS — R80.9 DIABETES MELLITUS WITH ALBUMINURIA (HCC): Primary | ICD-10-CM

## 2019-12-04 DIAGNOSIS — E11.29 DIABETES MELLITUS WITH ALBUMINURIA (HCC): Primary | ICD-10-CM

## 2019-12-04 PROBLEM — E11.21 TYPE 2 DIABETES WITH NEPHROPATHY (HCC): Status: ACTIVE | Noted: 2019-01-28

## 2019-12-04 PROBLEM — Z79.4 INSULIN DEPENDENT TYPE 2 DIABETES MELLITUS, UNCONTROLLED (HCC): Status: RESOLVED | Noted: 2019-01-28 | Resolved: 2019-12-04

## 2019-12-04 PROBLEM — IMO0002 INSULIN DEPENDENT TYPE 2 DIABETES MELLITUS, UNCONTROLLED: Status: RESOLVED | Noted: 2019-01-28 | Resolved: 2019-12-04

## 2019-12-04 PROBLEM — E11.65 INSULIN DEPENDENT TYPE 2 DIABETES MELLITUS, UNCONTROLLED (HCC): Status: RESOLVED | Noted: 2019-01-28 | Resolved: 2019-12-04

## 2019-12-04 PROCEDURE — 99215 OFFICE O/P EST HI 40 MIN: CPT | Performed by: NURSE PRACTITIONER

## 2019-12-04 PROCEDURE — 83036 HEMOGLOBIN GLYCOSYLATED A1C: CPT | Performed by: NURSE PRACTITIONER

## 2019-12-04 PROCEDURE — 95250 CONT GLUC MNTR PHYS/QHP EQP: CPT | Performed by: NURSE PRACTITIONER

## 2019-12-04 RX ORDER — METFORMIN HYDROCHLORIDE 500 MG/1
1000 TABLET, EXTENDED RELEASE ORAL 2 TIMES DAILY WITH MEALS
Qty: 360 TABLET | Refills: 1 | Status: SHIPPED | OUTPATIENT
Start: 2019-12-04 | End: 2020-04-01

## 2019-12-04 RX ORDER — GLIMEPIRIDE 2 MG/1
2 TABLET ORAL
Qty: 90 TABLET | Refills: 1 | Status: SHIPPED | OUTPATIENT
Start: 2019-12-04 | End: 2020-04-09 | Stop reason: DRUGHIGH

## 2019-12-04 RX ORDER — GLIMEPIRIDE 2 MG/1
2 TABLET ORAL
Qty: 30 TABLET | Refills: 0 | Status: SHIPPED | OUTPATIENT
Start: 2019-12-04 | End: 2019-12-04

## 2019-12-04 RX ORDER — METFORMIN HYDROCHLORIDE 500 MG/1
1000 TABLET, EXTENDED RELEASE ORAL 2 TIMES DAILY WITH MEALS
Qty: 120 TABLET | Refills: 0 | Status: SHIPPED | OUTPATIENT
Start: 2019-12-04 | End: 2019-12-04

## 2019-12-04 NOTE — PROGRESS NOTES
A continuous glucose sensor for 24 hour blood sugar monitoring was placed on patient today per APN order.    Serial NUMBER: [id number]  Exp date: (1/31/20)  - After cleansing skin with alcohol prep, Sensor (ANA PAULA)was inserted on  LEFT Posterior upper arm a

## 2019-12-04 NOTE — PROGRESS NOTES
Christie Mitchell is a 77year old female who presents today to establish for diabetes management.  Daughter is present w pt today and tranlator in room      Primary care physician: Colette Le MD   In the past 3m her DM control has improved to 8.7%   (last A Macrovascular:  PVD: no  CAD: no  Stroke/CVA: no    Modifying factors:  Medication adherence: yes   No MNT/DSME history - does not read food labels or portion control  Stressed, depression; takes care of 2 yr old granddaughter full time   Recent steroi ORIGINAL U/F) 29G X 12.7MM Does not apply Misc Use for insulin injections twice daily 90 each 11   • atorvastatin 20 MG Oral Tab Take 1 tablet (20 mg total) by mouth daily.  90 tablet 3   • losartan 100 MG Oral Tab Take 1 tablet (100 mg total) by mouth slim Pulmonary/Chest: Effort normal. clear lung sounds   Skin: +  presence of lipo hypertrophy due to lack of insulin rotation above umbilicus   Psychiatric: pleasant affect       Assessment/Plan:  Type 2 diabetes with nephropathy (HCC)  A1C 8.7% (last A1C 9. support/guidance with exercise planning and weight loss. The patient is asked to return in 1 week w RN CDE and 1 m w APN  but recommended to contact DM clinic sooner if questions or concerns.       The patient indicates understanding of these issues and

## 2019-12-04 NOTE — PATIENT INSTRUCTIONS
Llame también a la Chris para shalini si califica para recibir asistencia con medicamentos recetados.     [phone]         Estamos aquí para ayudarlo con la diabetes, shea recuerde que aún necesita a thompson médico de [employer] interactuar con el sensor. Simplemente sigue con tu rutina diaria. Es muy raro tener sangrado o dolor en el sitio del sensor, shea si esto ocurre, hágamelo saber.          Vidal sensor es resistente al agua y se puede usar mientras se baña, se ducha o nada objetivos de azúcar en la michelle:         Azúcar en michelle en ayunas (antes del desayuno) Objetivo:  ( idealmente menos de 110)    2 horas después de comer menos de 180 (idealmente menos de 263 )         Solicite niveles persistentes de azúcar en la related health problems. The main goal of diabetes treatment is to keep your sugar from going too high to prevent or delay complications from the disease as well as maintain your quality of life.    For most people the target for A1C is less than 7.0% but blood sugar readings. Removing the Sensor  Pull up the edge of the adhesive that keeps your sensor attached to your skin.  Slowly peel away from your skin in one motion (like pulling a bandaid off)  Sometimes, applying warm soapy water or lotion can help blood glucose, call your healthcare provider.   6. Always check your blood glucose before you drive           Fide Senters  [phone]

## 2019-12-17 ENCOUNTER — NURSE ONLY (OUTPATIENT)
Dept: ENDOCRINOLOGY CLINIC | Facility: CLINIC | Age: 69
End: 2019-12-17
Payer: MEDICARE

## 2019-12-17 VITALS — WEIGHT: 147 LBS | HEIGHT: 58.5 IN | BODY MASS INDEX: 30.03 KG/M2

## 2019-12-17 DIAGNOSIS — R80.9 DIABETES MELLITUS WITH ALBUMINURIA (HCC): Primary | ICD-10-CM

## 2019-12-17 DIAGNOSIS — E11.29 DIABETES MELLITUS WITH ALBUMINURIA (HCC): Primary | ICD-10-CM

## 2019-12-17 DIAGNOSIS — E11.21 TYPE 2 DIABETES WITH NEPHROPATHY (HCC): ICD-10-CM

## 2019-12-17 PROCEDURE — G0108 DIAB MANAGE TRN  PER INDIV: HCPCS

## 2019-12-17 NOTE — PROGRESS NOTES
Tavares Chen  [facility][birth year] was seen for Continuous Glucose Sensor Follow-up Visit:    Date: 12/17/2019  Referring Provider: Mehul Tavarez  Start time: 12pm End time: 1pm    Sensor Type: David Osullivan Professional     Site Assessment: left arm and it is free of red

## 2019-12-31 ENCOUNTER — NURSE ONLY (OUTPATIENT)
Dept: ENDOCRINOLOGY CLINIC | Facility: CLINIC | Age: 69
End: 2019-12-31
Payer: MEDICARE

## 2019-12-31 VITALS — WEIGHT: 148 LBS | BODY MASS INDEX: 30.24 KG/M2 | HEIGHT: 58.5 IN

## 2019-12-31 DIAGNOSIS — E11.8 TYPE 2 DIABETES MELLITUS WITH COMPLICATION, WITH LONG-TERM CURRENT USE OF INSULIN (HCC): Primary | ICD-10-CM

## 2019-12-31 DIAGNOSIS — Z79.4 TYPE 2 DIABETES MELLITUS WITH COMPLICATION, WITH LONG-TERM CURRENT USE OF INSULIN (HCC): Primary | ICD-10-CM

## 2019-12-31 PROCEDURE — G0108 DIAB MANAGE TRN  PER INDIV: HCPCS

## 2019-12-31 NOTE — PROGRESS NOTES
Anders Leone  : 1950 was seen for Diabetic Education Follow up:    Date: 2019  Referring Provider: Vinay Carrillo  Start time: 9:30am End time: 10am    Assessment:     Assessment: Ht 58.5\"   Wt 148 lb (67.1 kg)   BMI 30.41 kg/m²      HEMOGLOB plate method and food models. Reviewed principles for heart healthy diet (low fat, low saturated fat, high fiber, reduced sodium)    Being Active  Benefits and effect of activity on BG discussed. Reviewed when to call MD and benefits of goal setting.

## 2020-01-15 ENCOUNTER — PATIENT OUTREACH (OUTPATIENT)
Dept: CASE MANAGEMENT | Age: 70
End: 2020-01-15

## 2020-02-17 ENCOUNTER — TELEPHONE (OUTPATIENT)
Dept: INTERNAL MEDICINE CLINIC | Facility: CLINIC | Age: 70
End: 2020-02-17

## 2020-02-17 NOTE — TELEPHONE ENCOUNTER
Patient calling to see if we have any samples of insulin? She is trying to get government insurance and has to wait to get refills/too costly.  Please callback family member to advise

## 2020-02-17 NOTE — TELEPHONE ENCOUNTER
Yes can provide samples of toujeo. Please ask her to see me for her medicare wellness exam in the next 1-2 monhts.

## 2020-03-18 NOTE — TELEPHONE ENCOUNTER
Patient's Daughter calling in on behalf of Jimmy Marquez. Requesting samples for TOUJEO.     Please call pt/pt Daughter with sample status

## 2020-03-18 NOTE — TELEPHONE ENCOUNTER
Pts daughter notified that we do have samples available but it is advisable not to come into office d/t reduce the risk of exposure to covid-19. Daughter is agreeable to having rx sent to pharmacy. Medication pending if appropriate. TY!

## 2020-04-01 ENCOUNTER — TELEPHONE (OUTPATIENT)
Dept: ENDOCRINOLOGY CLINIC | Facility: CLINIC | Age: 70
End: 2020-04-01

## 2020-04-01 RX ORDER — METFORMIN HYDROCHLORIDE 500 MG/1
TABLET, EXTENDED RELEASE ORAL
Qty: 120 TABLET | Refills: 0 | Status: SHIPPED | OUTPATIENT
Start: 2020-04-01 | End: 2020-04-30

## 2020-04-01 NOTE — TELEPHONE ENCOUNTER
Medication(s) to Refill:   Requested Prescriptions     Pending Prescriptions Disp Refills   • METFORMIN HCL  MG Oral Tablet 24 Hr [Pharmacy Med Name: metFORMIN HCl  MG Oral Tablet Extended Release 24 Hour] 120 tablet 0     Sig: TAKE 2 TABLETS B

## 2020-04-08 ENCOUNTER — TELEPHONE (OUTPATIENT)
Dept: ENDOCRINOLOGY CLINIC | Facility: CLINIC | Age: 70
End: 2020-04-08

## 2020-04-08 NOTE — TELEPHONE ENCOUNTER
Called pt left a VM to call us back so we can ask her registration questions and to ask her about her blood sugar readings before tomorrow phone visit.

## 2020-04-08 NOTE — TELEPHONE ENCOUNTER
Pt called back spoke to her and she will have daughter translate for her at the time of visit also will email CAB the blood sugar readings for tomorrow as well. Did do registation at the time of call.

## 2020-04-09 ENCOUNTER — VIRTUAL PHONE E/M (OUTPATIENT)
Dept: ENDOCRINOLOGY CLINIC | Facility: CLINIC | Age: 70
End: 2020-04-09
Payer: MEDICARE

## 2020-04-09 DIAGNOSIS — E11.21 TYPE 2 DIABETES WITH NEPHROPATHY (HCC): Primary | ICD-10-CM

## 2020-04-09 PROCEDURE — 95251 CONT GLUC MNTR ANALYSIS I&R: CPT | Performed by: NURSE PRACTITIONER

## 2020-04-09 PROCEDURE — 99441 PHONE E/M BY PHYS 5-10 MIN: CPT | Performed by: NURSE PRACTITIONER

## 2020-04-09 RX ORDER — GLIMEPIRIDE 4 MG/1
4 TABLET ORAL
Qty: 90 TABLET | Refills: 1 | Status: SHIPPED | OUTPATIENT
Start: 2020-04-09 | End: 2020-09-02

## 2020-04-09 NOTE — ASSESSMENT & PLAN NOTE
A1C update at lab once COVID pandemic abates   For now, based on elevated post prandial trends: will increase Glimepiride to 4mg once daily   Continue:   Toujeo Max solostar: 28 units twice daily   Metformin XR 500m tabs twice daily   SMBG 2 x daily

## 2020-04-09 NOTE — PROGRESS NOTES
Virtual Telephone Check-In    Sami Galindo verbally consents to a Virtual/Telephone Check-In visit on 04/09/20. Patient understands and accepts financial responsibility for any deductible, co-insurance and/or co-pays associated with this service.   Meg hyperglycemia and uncontrolled DM  Recommended SMBG 2- x daily   F/u 6 weeks in DM center            Relevant Medications    Insulin Glargine, 2 Unit Dial, (TOUJEO MAX SOLOSTAR) 300 UNIT/ML Subcutaneous Solution Pen-injector        LEVI May

## 2020-04-30 ENCOUNTER — TELEPHONE (OUTPATIENT)
Dept: INTERNAL MEDICINE CLINIC | Facility: CLINIC | Age: 70
End: 2020-04-30

## 2020-04-30 RX ORDER — METFORMIN HYDROCHLORIDE 500 MG/1
1000 TABLET, EXTENDED RELEASE ORAL 2 TIMES DAILY WITH MEALS
Qty: 120 TABLET | Refills: 0 | Status: SHIPPED | OUTPATIENT
Start: 2020-04-30 | End: 2020-04-30

## 2020-04-30 RX ORDER — BLOOD SUGAR DIAGNOSTIC
STRIP MISCELLANEOUS
Qty: 300 STRIP | Refills: 11 | Status: SHIPPED | OUTPATIENT
Start: 2020-04-30 | End: 2021-10-29

## 2020-04-30 RX ORDER — METFORMIN HYDROCHLORIDE 500 MG/1
1000 TABLET, EXTENDED RELEASE ORAL 2 TIMES DAILY WITH MEALS
Qty: 120 TABLET | Refills: 0 | Status: SHIPPED | OUTPATIENT
Start: 2020-04-30 | End: 2020-07-03

## 2020-04-30 NOTE — TELEPHONE ENCOUNTER
Needs a refill for test strips for accu check called to [facility], also Metformin ER sent to [facility] in [place]

## 2020-05-08 NOTE — TELEPHONE ENCOUNTER
HCTZ 25 mg  Last OV relevant to medication: 11-13-19  Last refill date: 11-13-19  #/refills: 3  When pt was asked to return for OV: 6 wks  Upcoming appt/reason: none  Recent labs: 6-10-19: BMP

## 2020-05-11 RX ORDER — HYDROCHLOROTHIAZIDE 25 MG/1
25 TABLET ORAL DAILY
Qty: 90 TABLET | Refills: 3 | OUTPATIENT
Start: 2020-05-11

## 2020-05-11 RX ORDER — HYDROCHLOROTHIAZIDE 25 MG/1
25 TABLET ORAL DAILY
Qty: 90 TABLET | Refills: 0 | Status: SHIPPED | OUTPATIENT
Start: 2020-05-11 | End: 2020-12-11

## 2020-05-11 NOTE — TELEPHONE ENCOUNTER
1. Due for labs - orders are in for [facility]. If she prefers [address], please change the order  2. She is due for her MAS. Please schedule an IN PERSON visit on a Wednesday. Block appt for 45 minutes.

## 2020-05-11 NOTE — TELEPHONE ENCOUNTER
Called patient to inform her of labs due and orders are placed and available to get done at [facility]  Scheduled MA supervisit for Wednesday June 3rd @ 11 am

## 2020-06-10 RX ORDER — METFORMIN HYDROCHLORIDE 500 MG/1
1000 TABLET, EXTENDED RELEASE ORAL 2 TIMES DAILY WITH MEALS
Qty: 120 TABLET | Refills: 0 | OUTPATIENT
Start: 2020-06-10

## 2020-06-10 NOTE — TELEPHONE ENCOUNTER
Medication(s) to Refill:   Requested Prescriptions     Pending Prescriptions Disp Refills   • Insulin Glargine, 2 Unit Dial, (TOUJEO MAX SOLOSTAR) 300 UNIT/ML Subcutaneous Solution Pen-injector 9 mL 1     Sig: Use up to 90 units daily as directed

## 2020-06-10 NOTE — TELEPHONE ENCOUNTER
metFORMIN HCl  MG Oral Tablet 24 Hr  Protocol Failed   LOV: 11/13/2019   RTC: 6 weeks   Upcoming OV: none scheduled   Filled: 4/30/2020 #120 0 refills   Recent labs: 12/4/2019

## 2020-07-03 RX ORDER — METFORMIN HYDROCHLORIDE 500 MG/1
TABLET, EXTENDED RELEASE ORAL
Qty: 120 TABLET | Refills: 0 | Status: SHIPPED | OUTPATIENT
Start: 2020-07-03 | End: 2020-08-10

## 2020-07-03 NOTE — TELEPHONE ENCOUNTER
Spoke to daughter Milissa Goldmann is on [email or web address], let her know that mom is over due for lab work. Labs were ordered back in May and that they were sent to [facility] lab. Mom is to fast 8-10 hours.  Milissa Goldmann states she will talk to mom and take before the end of this mo

## 2020-08-10 RX ORDER — METFORMIN HYDROCHLORIDE 500 MG/1
TABLET, EXTENDED RELEASE ORAL
Qty: 120 TABLET | Refills: 0 | Status: SHIPPED | OUTPATIENT
Start: 2020-08-10 | End: 2020-09-02

## 2020-08-10 NOTE — TELEPHONE ENCOUNTER
Pt called stated she is completely out of her metformin medication, pt is requesting to please ask her pcp if she can please approve/expedite it by today. Pt aware refills need a time frame for 2-3 business days. Please advise.

## 2020-08-10 NOTE — TELEPHONE ENCOUNTER
Last refill. Needs to schedule an appt with Cristianadarcy Cloud because her DM is very uncontrolled and needs to KEEP her appt with me on 9/2.

## 2020-08-11 RX ORDER — METFORMIN HYDROCHLORIDE 500 MG/1
TABLET, EXTENDED RELEASE ORAL
Qty: 120 TABLET | Refills: 0 | OUTPATIENT
Start: 2020-08-11

## 2020-08-11 NOTE — TELEPHONE ENCOUNTER
Lm    When patient calls back please inform Last refill. Needs to schedule an appt with Hua Garcia because her DM is very uncontrolled and needs to KEEP her appt with me on 9/2.

## 2020-08-13 NOTE — TELEPHONE ENCOUNTER
Lmtcb # 2     When patient calls back please inform Last refill.  Needs to schedule an appt with Daniel Helms because her DM is very uncontrolled and needs to KEEP her appt with me on 9/2.

## 2020-09-02 ENCOUNTER — OFFICE VISIT (OUTPATIENT)
Dept: INTERNAL MEDICINE CLINIC | Facility: CLINIC | Age: 70
End: 2020-09-02
Payer: MEDICARE

## 2020-09-02 VITALS
HEIGHT: 58.75 IN | BODY MASS INDEX: 30.31 KG/M2 | SYSTOLIC BLOOD PRESSURE: 124 MMHG | WEIGHT: 148.38 LBS | HEART RATE: 80 BPM | RESPIRATION RATE: 16 BRPM | DIASTOLIC BLOOD PRESSURE: 60 MMHG | TEMPERATURE: 99 F

## 2020-09-02 DIAGNOSIS — Z00.00 ROUTINE GENERAL MEDICAL EXAMINATION AT A HEALTH CARE FACILITY: Primary | ICD-10-CM

## 2020-09-02 DIAGNOSIS — E11.59 HYPERTENSION ASSOCIATED WITH DIABETES (HCC): ICD-10-CM

## 2020-09-02 DIAGNOSIS — E11.3513 PROLIFERATIVE DIABETIC RETINOPATHY OF BOTH EYES WITH MACULAR EDEMA ASSOCIATED WITH TYPE 2 DIABETES MELLITUS (HCC): ICD-10-CM

## 2020-09-02 DIAGNOSIS — E78.5 HYPERLIPIDEMIA ASSOCIATED WITH TYPE 2 DIABETES MELLITUS (HCC): ICD-10-CM

## 2020-09-02 DIAGNOSIS — Z12.11 SCREEN FOR COLON CANCER: ICD-10-CM

## 2020-09-02 DIAGNOSIS — F33.1 MDD (MAJOR DEPRESSIVE DISORDER), RECURRENT EPISODE, MODERATE (HCC): ICD-10-CM

## 2020-09-02 DIAGNOSIS — Z79.4 UNCONTROLLED INSULIN-TREATED TYPE 2 DIABETES MELLITUS (HCC): ICD-10-CM

## 2020-09-02 DIAGNOSIS — E11.69 HYPERLIPIDEMIA ASSOCIATED WITH TYPE 2 DIABETES MELLITUS (HCC): ICD-10-CM

## 2020-09-02 DIAGNOSIS — I15.2 HYPERTENSION ASSOCIATED WITH DIABETES (HCC): ICD-10-CM

## 2020-09-02 DIAGNOSIS — Z78.0 POSTMENOPAUSAL: ICD-10-CM

## 2020-09-02 DIAGNOSIS — E11.65 UNCONTROLLED INSULIN-TREATED TYPE 2 DIABETES MELLITUS (HCC): ICD-10-CM

## 2020-09-02 DIAGNOSIS — E11.21 TYPE 2 DIABETES WITH NEPHROPATHY (HCC): ICD-10-CM

## 2020-09-02 DIAGNOSIS — Z12.31 VISIT FOR SCREENING MAMMOGRAM: ICD-10-CM

## 2020-09-02 PROBLEM — E66.9 DIABETES MELLITUS TYPE 2 IN OBESE  (HCC): Status: ACTIVE | Noted: 2019-01-28

## 2020-09-02 PROBLEM — IMO0002 UNCONTROLLED INSULIN-TREATED TYPE 2 DIABETES MELLITUS: Status: ACTIVE | Noted: 2020-09-02

## 2020-09-02 PROBLEM — E66.9 DIABETES MELLITUS TYPE 2 IN OBESE (HCC): Status: ACTIVE | Noted: 2019-01-28

## 2020-09-02 PROBLEM — E66.9 DIABETES MELLITUS TYPE 2 IN OBESE: Status: ACTIVE | Noted: 2019-01-28

## 2020-09-02 PROCEDURE — 3074F SYST BP LT 130 MM HG: CPT | Performed by: INTERNAL MEDICINE

## 2020-09-02 PROCEDURE — 96160 PT-FOCUSED HLTH RISK ASSMT: CPT | Performed by: INTERNAL MEDICINE

## 2020-09-02 PROCEDURE — 99397 PER PM REEVAL EST PAT 65+ YR: CPT | Performed by: INTERNAL MEDICINE

## 2020-09-02 PROCEDURE — 3008F BODY MASS INDEX DOCD: CPT | Performed by: INTERNAL MEDICINE

## 2020-09-02 PROCEDURE — G0439 PPPS, SUBSEQ VISIT: HCPCS | Performed by: INTERNAL MEDICINE

## 2020-09-02 PROCEDURE — 3078F DIAST BP <80 MM HG: CPT | Performed by: INTERNAL MEDICINE

## 2020-09-02 RX ORDER — METFORMIN HYDROCHLORIDE 500 MG/1
1000 TABLET, EXTENDED RELEASE ORAL 2 TIMES DAILY WITH MEALS
Qty: 360 TABLET | Refills: 1 | Status: SHIPPED | OUTPATIENT
Start: 2020-09-02 | End: 2020-09-14

## 2020-09-02 RX ORDER — BUPROPION HYDROCHLORIDE 300 MG/1
300 TABLET ORAL SEE ADMIN INSTRUCTIONS
Qty: 30 TABLET | Refills: 1 | Status: SHIPPED | OUTPATIENT
Start: 2020-09-02 | End: 2020-12-11

## 2020-09-02 RX ORDER — GLIMEPIRIDE 4 MG/1
8 TABLET ORAL
Qty: 180 TABLET | Refills: 1 | Status: SHIPPED | OUTPATIENT
Start: 2020-09-02 | End: 2021-04-23

## 2020-09-02 RX ORDER — BUPROPION HYDROCHLORIDE 150 MG/1
150 TABLET ORAL SEE ADMIN INSTRUCTIONS
Qty: 7 TABLET | Refills: 0 | Status: SHIPPED | OUTPATIENT
Start: 2020-09-02 | End: 2020-12-11

## 2020-09-02 NOTE — PATIENT INSTRUCTIONS
English instructions  You are supposed to be on both hydrochlorothiazide 25 mg and losartan 100 mg every day for your high blood pressure.  If you are currently only taking the hydrochlorothiazide, then I would like you to STOP it and start losartan 25 mg e para renovar thompson receta. Para thompson depresión, comience con buproprion (wellbutrin) 150 mg al día x 7 días y aumente a 300 mg al día después de 7 días si lo tolera yuko. Thompson diabetes está muy descontrolada. Aumente la glimeperida a 8 mg cada mañana.  Roseann

## 2020-09-02 NOTE — PROGRESS NOTES
Ramona Givens is a 72year old female. HPI:   Patient presents for medicare supervisit and additional issues noted below. Comes in with daughter, Yas Led  1. DM and obesity: her DM has significantly worsened.  States she has been overeating during th Diagnosis Date   • [facility] 1[facility]28[facility]2019   • Hyperlipidemia    • Nephropathy    • Type II or unspecified type diabetes mellitus without mention of complication, not stated as uncontrolled    • Unspecified essential hypertensi nutrition, handouts provided. - increase glimeperide to 8 mg daily, cont toujeo and metformin. 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  I ha plan.  The patient is asked to return in 6 weeks for close f/u of depression and uncontrolled DM  Harriet Gibson MD

## 2020-09-11 NOTE — TELEPHONE ENCOUNTER
metFORMIN HCl  MG   Insulin Glargine, 1 Unit Dial, (TOUJEO SOLOSTAR) 300 UNIT/ML Subcutaneous Solution Pen-injector  [facility], [phone]

## 2020-09-14 RX ORDER — METFORMIN HYDROCHLORIDE 500 MG/1
1000 TABLET, EXTENDED RELEASE ORAL 2 TIMES DAILY WITH MEALS
Qty: 360 TABLET | Refills: 0 | Status: SHIPPED | OUTPATIENT
Start: 2020-09-14 | End: 2020-12-11

## 2020-09-14 NOTE — TELEPHONE ENCOUNTER
Last refill. Needs to see me in mid-October. Please call to ensure she scheduled OV. Daughter needs to come with her.

## 2020-09-14 NOTE — TELEPHONE ENCOUNTER
Refill requested:   Requested Prescriptions     Pending Prescriptions Disp Refills   • metFORMIN HCl  MG Oral Tablet 24 Hr 360 tablet 1     Sig: Take 2 tablets (1,000 mg total) by mouth 2 (two) times daily with meals.    • Insulin Glargine, 1 Unit Liz

## 2020-11-21 NOTE — TELEPHONE ENCOUNTER
Medication(s) to Refill:   Requested Prescriptions     Pending Prescriptions Disp Refills   • Insulin Glargine, 1 Unit Dial, (TOUJEO SOLOSTAR) 300 UNIT/ML Subcutaneous Solution Pen-injector [Pharmacy Med Name: Toujeo SoloStar 300 UNIT/ML Subcutaneous Solut

## 2020-11-22 RX ORDER — INSULIN GLARGINE 300 U/ML
INJECTION, SOLUTION SUBCUTANEOUS
Qty: 6 ML | Refills: 0 | OUTPATIENT
Start: 2020-11-22

## 2020-11-23 ENCOUNTER — TELEPHONE (OUTPATIENT)
Dept: INTERNAL MEDICINE CLINIC | Facility: CLINIC | Age: 70
End: 2020-11-23

## 2020-11-24 RX ORDER — INSULIN GLARGINE 300 U/ML
INJECTION, SOLUTION SUBCUTANEOUS
Qty: 2 PEN | Refills: 0 | OUTPATIENT
Start: 2020-11-24

## 2020-11-24 NOTE — TELEPHONE ENCOUNTER
Medication(s) to Refill:   Requested Prescriptions     Pending Prescriptions Disp Refills   • Insulin Glargine, 1 Unit Dial, (TOUJEO SOLOSTAR) 300 UNIT/ML Subcutaneous Solution Pen-injector 2 pen 0     Sig: Inject 30 units into the skin 2 times a day

## 2020-11-24 NOTE — TELEPHONE ENCOUNTER
Due for labs (orders are in) and office visit. Prefer a virtual visit if daughter can also be present.

## 2020-11-25 NOTE — TELEPHONE ENCOUNTER
Daughter booked appt for 12/11/20 , she is aware to be present for her mother visit and is aware she must completed A1C prior to appt.  Task completed

## 2020-11-27 NOTE — TELEPHONE ENCOUNTER
Patient has appointment scheduled. Requesting refill until then.      Patient is completely out of medication    Future Appointments   Date Time Provider Kwan Carmichael   12/11/2020 10:00 AM Vinay Harrington MD [facility] [place]

## 2020-11-28 RX ORDER — INSULIN GLARGINE 300 U/ML
INJECTION, SOLUTION SUBCUTANEOUS
Qty: 2 PEN | Refills: 0 | Status: SHIPPED | OUTPATIENT
Start: 2020-11-28 | End: 2020-12-11

## 2020-11-30 RX ORDER — INSULIN GLARGINE 300 U/ML
INJECTION, SOLUTION SUBCUTANEOUS
Qty: 6 ML | Refills: 0 | OUTPATIENT
Start: 2020-11-30

## 2020-12-09 ENCOUNTER — LAB ENCOUNTER (OUTPATIENT)
Dept: LAB | Age: 70
End: 2020-12-09
Attending: INTERNAL MEDICINE
Payer: MEDICARE

## 2020-12-09 PROCEDURE — 83036 HEMOGLOBIN GLYCOSYLATED A1C: CPT | Performed by: INTERNAL MEDICINE

## 2020-12-09 PROCEDURE — 36415 COLL VENOUS BLD VENIPUNCTURE: CPT | Performed by: INTERNAL MEDICINE

## 2020-12-11 ENCOUNTER — OFFICE VISIT (OUTPATIENT)
Dept: INTERNAL MEDICINE CLINIC | Facility: CLINIC | Age: 70
End: 2020-12-11
Payer: MEDICARE

## 2020-12-11 DIAGNOSIS — E78.5 HYPERLIPIDEMIA ASSOCIATED WITH TYPE 2 DIABETES MELLITUS (HCC): ICD-10-CM

## 2020-12-11 DIAGNOSIS — E11.59 HYPERTENSION ASSOCIATED WITH DIABETES (HCC): Primary | ICD-10-CM

## 2020-12-11 DIAGNOSIS — F33.1 MDD (MAJOR DEPRESSIVE DISORDER), RECURRENT EPISODE, MODERATE (HCC): ICD-10-CM

## 2020-12-11 DIAGNOSIS — I15.2 HYPERTENSION ASSOCIATED WITH DIABETES (HCC): Primary | ICD-10-CM

## 2020-12-11 DIAGNOSIS — E11.65 UNCONTROLLED INSULIN-TREATED TYPE 2 DIABETES MELLITUS (HCC): ICD-10-CM

## 2020-12-11 DIAGNOSIS — Z79.4 UNCONTROLLED INSULIN-TREATED TYPE 2 DIABETES MELLITUS (HCC): ICD-10-CM

## 2020-12-11 DIAGNOSIS — E11.69 HYPERLIPIDEMIA ASSOCIATED WITH TYPE 2 DIABETES MELLITUS (HCC): ICD-10-CM

## 2020-12-11 PROCEDURE — 99214 OFFICE O/P EST MOD 30 MIN: CPT | Performed by: INTERNAL MEDICINE

## 2020-12-11 RX ORDER — INSULIN GLARGINE 300 U/ML
INJECTION, SOLUTION SUBCUTANEOUS
Qty: 2 PEN | Refills: 0 | Status: SHIPPED | OUTPATIENT
Start: 2020-12-11 | End: 2021-01-29

## 2020-12-11 RX ORDER — LOSARTAN POTASSIUM 100 MG/1
100 TABLET ORAL DAILY
Qty: 90 TABLET | Refills: 3 | Status: SHIPPED | OUTPATIENT
Start: 2020-12-11 | End: 2021-10-29

## 2020-12-11 RX ORDER — HYDROCHLOROTHIAZIDE 25 MG/1
25 TABLET ORAL DAILY
Qty: 90 TABLET | Refills: 3 | Status: SHIPPED | OUTPATIENT
Start: 2020-12-11 | End: 2021-10-29

## 2020-12-11 RX ORDER — METFORMIN HYDROCHLORIDE 500 MG/1
1000 TABLET, EXTENDED RELEASE ORAL 2 TIMES DAILY WITH MEALS
Qty: 360 TABLET | Refills: 1 | Status: SHIPPED | OUTPATIENT
Start: 2020-12-11 | End: 2021-10-29 | Stop reason: DRUGHIGH

## 2020-12-11 RX ORDER — ATORVASTATIN CALCIUM 20 MG/1
20 TABLET, FILM COATED ORAL DAILY
Qty: 90 TABLET | Refills: 3 | Status: SHIPPED | OUTPATIENT
Start: 2020-12-11 | End: 2021-04-23

## 2020-12-11 RX ORDER — ESCITALOPRAM OXALATE 10 MG/1
10 TABLET ORAL SEE ADMIN INSTRUCTIONS
Qty: 60 TABLET | Refills: 1 | Status: SHIPPED | OUTPATIENT
Start: 2020-12-11 | End: 2021-04-23

## 2020-12-11 NOTE — PROGRESS NOTES
Yaquelin Hale is a 72year old female. Virtual Telephone Check-In    This visit is conducted using Telemedicine with live, interactive video and audio.   Patient understands and accepts financial responsibility for any deductible, co-insurance and/or co- Daughter, Juan West is on phone also. 1. MDD: she tried wellbutrin for 2-3 weeks but it caused constiaption and did not help her mood. She feels she is doing ok. She does not want to try another agent. Denies SI/HI. Sometimes she is tearful.    2. IDDM: h HYSTERECTOMY  1998   • OTHER SURGICAL HISTORY Right     right knee repair but not a replacement      Social History:    Social History    Tobacco Use      Smoking status: Never Smoker      Smokeless tobacco: Never Used    Alcohol use: Not Currently      Co victoza. # Health Maintenance: medicare supervisit on 9/2/2020  Stress Management: she feels she is coping well with stress  Indication for ASA (50-55 yo): her 10 year ASCVD score is 20%. Research for initiating ASA therapy is not strong in her age.  Will

## 2020-12-11 NOTE — PATIENT INSTRUCTIONS
You are long overdue for your diabetic eye exam. Please complete this as soon as possible. Please make sure you are taking all the medications listed on this sheet.     Please increase your toujeou to 30 units twice daily and see Leydi Javed for further

## 2020-12-23 RX ORDER — INSULIN GLARGINE 300 U/ML
INJECTION, SOLUTION SUBCUTANEOUS
Qty: 6 PEN | Refills: 0 | OUTPATIENT
Start: 2020-12-23

## 2020-12-23 NOTE — TELEPHONE ENCOUNTER
Insulin Glargine, 1 Unit Dial, (TOUJEO SOLOSTAR) 300 UNIT/ML Subcutaneous Solution Pen-injector          Sig: Inject 30 units into the skin 2 times a day    Disp:  6 pen    Refills:  0    Start: 12/23/2020    Class: Normal    Last ordered: 1 week ago by [employer]

## 2021-01-27 RX ORDER — INSULIN GLARGINE 300 U/ML
INJECTION, SOLUTION SUBCUTANEOUS
Qty: 2 PEN | Refills: 0 | OUTPATIENT
Start: 2021-01-27

## 2021-01-27 NOTE — TELEPHONE ENCOUNTER
Insulin Glargine, 1 Unit Dial, (TOUJEO SOLOSTAR) 300 UNIT/ML Subcutaneous Solution Pen-injector  [facility], [phone]

## 2021-01-27 NOTE — TELEPHONE ENCOUNTER
Toujeo solostar   Filled 12-11-20  Qty 2 pen  0 pen  No upcoming appt.    LOV 12-11-20  Lab: 12-9-20: HgBA1c

## 2021-01-27 NOTE — TELEPHONE ENCOUNTER
1. Was due for labs in October. Orders are in.  2. Has very uncontrolled DM.  Was supposed to see Rex Crandall - has not

## 2021-01-28 NOTE — TELEPHONE ENCOUNTER
Left voice message with patient. When patient calls back please inform :    1. Was due for labs in October. Orders are in.  2. Has very uncontrolled DM.  Was supposed to see Dianelys Birch - has not

## 2021-01-29 DIAGNOSIS — E78.5 HYPERLIPIDEMIA ASSOCIATED WITH TYPE 2 DIABETES MELLITUS (HCC): ICD-10-CM

## 2021-01-29 DIAGNOSIS — E11.69 HYPERLIPIDEMIA ASSOCIATED WITH TYPE 2 DIABETES MELLITUS (HCC): ICD-10-CM

## 2021-01-29 DIAGNOSIS — I15.2 HYPERTENSION ASSOCIATED WITH DIABETES (HCC): Primary | ICD-10-CM

## 2021-01-29 DIAGNOSIS — E11.59 HYPERTENSION ASSOCIATED WITH DIABETES (HCC): Primary | ICD-10-CM

## 2021-01-29 DIAGNOSIS — E66.9 DIABETES MELLITUS TYPE 2 IN OBESE (HCC): ICD-10-CM

## 2021-01-29 DIAGNOSIS — E11.69 DIABETES MELLITUS TYPE 2 IN OBESE (HCC): ICD-10-CM

## 2021-01-29 RX ORDER — INSULIN GLARGINE 300 U/ML
INJECTION, SOLUTION SUBCUTANEOUS
Qty: 2 PEN | Refills: 0 | Status: SHIPPED | OUTPATIENT
Start: 2021-01-29 | End: 2021-04-23

## 2021-01-29 RX ORDER — INSULIN GLARGINE 300 U/ML
INJECTION, SOLUTION SUBCUTANEOUS
Qty: 8 ML | Refills: 0 | OUTPATIENT
Start: 2021-01-29

## 2021-01-29 NOTE — TELEPHONE ENCOUNTER
1. Needs to complete labs asap. Orders are in  2. Needs to inform us how much insulin she is currently taking  3. Can provide temporary refill to get her through labs and OV with me.

## 2021-01-29 NOTE — TELEPHONE ENCOUNTER
Patient stated she is currently taking 30 units 2x a day 1 after breakfast and one before bed time. Informed patient she is over due for labs as well.  She requested for labs to be placed for edward not quest.     Provided CS number and Mehul Tavarez numb

## 2021-01-29 NOTE — TELEPHONE ENCOUNTER
Pt has 2 different Sig. Please advise    Cyrus   Filled 12-11-20  Qty 2 pen  0 refills  No upcoming appt.    LOV 12-11-20

## 2021-02-02 DIAGNOSIS — Z23 NEED FOR VACCINATION: ICD-10-CM

## 2021-02-03 ENCOUNTER — LAB ENCOUNTER (OUTPATIENT)
Dept: LAB | Age: 71
End: 2021-02-03
Attending: INTERNAL MEDICINE
Payer: MEDICARE

## 2021-02-03 DIAGNOSIS — E11.69 HYPERLIPIDEMIA ASSOCIATED WITH TYPE 2 DIABETES MELLITUS (HCC): ICD-10-CM

## 2021-02-03 DIAGNOSIS — E66.9 DIABETES MELLITUS TYPE 2 IN OBESE (HCC): ICD-10-CM

## 2021-02-03 DIAGNOSIS — E11.69 DIABETES MELLITUS TYPE 2 IN OBESE (HCC): ICD-10-CM

## 2021-02-03 DIAGNOSIS — I15.2 HYPERTENSION ASSOCIATED WITH DIABETES (HCC): ICD-10-CM

## 2021-02-03 DIAGNOSIS — E78.5 HYPERLIPIDEMIA ASSOCIATED WITH TYPE 2 DIABETES MELLITUS (HCC): ICD-10-CM

## 2021-02-03 DIAGNOSIS — E11.59 HYPERTENSION ASSOCIATED WITH DIABETES (HCC): ICD-10-CM

## 2021-02-03 LAB
ALT SERPL-CCNC: 40 U/L
ANION GAP SERPL CALC-SCNC: 7 MMOL/L (ref 0–18)
AST SERPL-CCNC: 34 U/L (ref 15–37)
BUN BLD-MCNC: 22 MG/DL (ref 7–18)
BUN/CREAT SERPL: 22.7 (ref 10–20)
CALCIUM BLD-MCNC: 9.6 MG/DL (ref 8.5–10.1)
CHLORIDE SERPL-SCNC: 105 MMOL/L (ref 98–112)
CHOLEST SMN-MCNC: 204 MG/DL (ref ?–200)
CO2 SERPL-SCNC: 25 MMOL/L (ref 21–32)
CREAT BLD-MCNC: 0.97 MG/DL
CREAT UR-SCNC: 117 MG/DL
EST. AVERAGE GLUCOSE BLD GHB EST-MCNC: 223 MG/DL (ref 68–126)
GLUCOSE BLD-MCNC: 186 MG/DL (ref 70–99)
HBA1C MFR BLD HPLC: 9.4 % (ref ?–5.7)
HDLC SERPL-MCNC: 71 MG/DL (ref 40–59)
LDLC SERPL CALC-MCNC: 97 MG/DL (ref ?–100)
MICROALBUMIN UR-MCNC: 55.9 MG/DL
MICROALBUMIN/CREAT 24H UR-RTO: 477.8 UG/MG (ref ?–30)
NONHDLC SERPL-MCNC: 133 MG/DL (ref ?–130)
OSMOLALITY SERPL CALC.SUM OF ELEC: 292 MOSM/KG (ref 275–295)
PATIENT FASTING Y/N/NP: YES
PATIENT FASTING Y/N/NP: YES
POTASSIUM SERPL-SCNC: 4 MMOL/L (ref 3.5–5.1)
SODIUM SERPL-SCNC: 137 MMOL/L (ref 136–145)
TRIGL SERPL-MCNC: 179 MG/DL (ref 30–149)
VLDLC SERPL CALC-MCNC: 36 MG/DL (ref 0–30)

## 2021-02-03 PROCEDURE — 84450 TRANSFERASE (AST) (SGOT): CPT

## 2021-02-03 PROCEDURE — 80061 LIPID PANEL: CPT

## 2021-02-03 PROCEDURE — 36415 COLL VENOUS BLD VENIPUNCTURE: CPT

## 2021-02-03 PROCEDURE — 84460 ALANINE AMINO (ALT) (SGPT): CPT

## 2021-02-03 PROCEDURE — 82043 UR ALBUMIN QUANTITATIVE: CPT

## 2021-02-03 PROCEDURE — 80048 BASIC METABOLIC PNL TOTAL CA: CPT

## 2021-02-03 PROCEDURE — 82570 ASSAY OF URINE CREATININE: CPT

## 2021-02-03 PROCEDURE — 83036 HEMOGLOBIN GLYCOSYLATED A1C: CPT

## 2021-03-25 ENCOUNTER — APPOINTMENT (OUTPATIENT)
Dept: GENERAL RADIOLOGY | Age: 71
End: 2021-03-25
Attending: EMERGENCY MEDICINE
Payer: MEDICARE

## 2021-03-25 ENCOUNTER — HOSPITAL ENCOUNTER (EMERGENCY)
Age: 71
Discharge: HOME | End: 2021-03-25
Attending: EMERGENCY MEDICINE
Payer: MEDICARE

## 2021-03-25 VITALS
BODY MASS INDEX: 31.41 KG/M2 | DIASTOLIC BLOOD PRESSURE: 80 MMHG | SYSTOLIC BLOOD PRESSURE: 178 MMHG | TEMPERATURE: 97 F | WEIGHT: 160 LBS | OXYGEN SATURATION: 97 % | HEART RATE: 83 BPM | HEIGHT: 60 IN | RESPIRATION RATE: 20 BRPM
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
## 2021-03-25 DIAGNOSIS — R19.7 NAUSEA VOMITING AND DIARRHEA: ICD-10-CM

## 2021-03-25 DIAGNOSIS — R11.2 NAUSEA VOMITING AND DIARRHEA: ICD-10-CM

## 2021-03-25 DIAGNOSIS — S20.211A CONTUSION OF RIGHT CHEST WALL, INITIAL ENCOUNTER: Primary | ICD-10-CM

## 2021-03-25 DIAGNOSIS — R73.9 HYPERGLYCEMIA: ICD-10-CM

## 2021-03-25 LAB
ALBUMIN SERPL-MCNC: 3.2 G/DL (ref 3.4–5)
ALBUMIN/GLOB SERPL: 0.8 {RATIO} (ref 1–2)
ALP LIVER SERPL-CCNC: 95 U/L
ALT SERPL-CCNC: 35 U/L
ANION GAP SERPL CALC-SCNC: 8 MMOL/L (ref 0–18)
AST SERPL-CCNC: 33 U/L (ref 15–37)
BASOPHILS # BLD AUTO: 0.03 X10(3) UL (ref 0–0.2)
BASOPHILS NFR BLD AUTO: 0.4 %
BILIRUB SERPL-MCNC: 0.6 MG/DL (ref 0.1–2)
BILIRUB UR QL STRIP.AUTO: NEGATIVE
BUN BLD-MCNC: 18 MG/DL (ref 7–18)
BUN/CREAT SERPL: 17.8 (ref 10–20)
CALCIUM BLD-MCNC: 8.8 MG/DL (ref 8.5–10.1)
CHLORIDE SERPL-SCNC: 99 MMOL/L (ref 98–112)
CLARITY UR REFRACT.AUTO: CLEAR
CO2 SERPL-SCNC: 24 MMOL/L (ref 21–32)
COLOR UR AUTO: YELLOW
CREAT BLD-MCNC: 1.01 MG/DL
DEPRECATED RDW RBC AUTO: 43.1 FL (ref 35.1–46.3)
EOSINOPHIL # BLD AUTO: 0.08 X10(3) UL (ref 0–0.7)
EOSINOPHIL NFR BLD AUTO: 1.2 %
ERYTHROCYTE [DISTWIDTH] IN BLOOD BY AUTOMATED COUNT: 13.4 % (ref 11–15)
GLOBULIN PLAS-MCNC: 4 G/DL (ref 2.8–4.4)
GLUCOSE BLD-MCNC: 375 MG/DL (ref 70–99)
GLUCOSE UR STRIP.AUTO-MCNC: >=1000 MG/DL
HCT VFR BLD AUTO: 34.5 %
HGB BLD-MCNC: 11.7 G/DL
IMM GRANULOCYTES # BLD AUTO: 0.02 X10(3) UL (ref 0–1)
IMM GRANULOCYTES NFR BLD: 0.3 %
KETONES UR STRIP.AUTO-MCNC: NEGATIVE MG/DL
LIPASE SERPL-CCNC: 231 U/L (ref 73–393)
LYMPHOCYTES # BLD AUTO: 1.11 X10(3) UL (ref 1–4)
LYMPHOCYTES NFR BLD AUTO: 16.4 %
M PROTEIN MFR SERPL ELPH: 7.2 G/DL (ref 6.4–8.2)
MCH RBC QN AUTO: 29.5 PG (ref 26–34)
MCHC RBC AUTO-ENTMCNC: 33.9 G/DL (ref 31–37)
MCV RBC AUTO: 87.1 FL
MONOCYTES # BLD AUTO: 0.32 X10(3) UL (ref 0.1–1)
MONOCYTES NFR BLD AUTO: 4.7 %
NEUTROPHILS # BLD AUTO: 5.19 X10 (3) UL (ref 1.5–7.7)
NEUTROPHILS # BLD AUTO: 5.19 X10(3) UL (ref 1.5–7.7)
NEUTROPHILS NFR BLD AUTO: 77 %
NITRITE UR QL STRIP.AUTO: NEGATIVE
OSMOLALITY SERPL CALC.SUM OF ELEC: 289 MOSM/KG (ref 275–295)
PH UR STRIP.AUTO: 7 [PH] (ref 5–8)
PLATELET # BLD AUTO: 133 10(3)UL (ref 150–450)
POTASSIUM SERPL-SCNC: 3.7 MMOL/L (ref 3.5–5.1)
RBC # BLD AUTO: 3.96 X10(6)UL
SODIUM SERPL-SCNC: 131 MMOL/L (ref 136–145)
SP GR UR STRIP.AUTO: 1.02 (ref 1–1.03)
UROBILINOGEN UR STRIP.AUTO-MCNC: 1 MG/DL
WBC # BLD AUTO: 6.8 X10(3) UL (ref 4–11)

## 2021-03-25 PROCEDURE — 81001 URINALYSIS AUTO W/SCOPE: CPT | Performed by: EMERGENCY MEDICINE

## 2021-03-25 PROCEDURE — 85025 COMPLETE CBC W/AUTO DIFF WBC: CPT | Performed by: EMERGENCY MEDICINE

## 2021-03-25 PROCEDURE — 80053 COMPREHEN METABOLIC PANEL: CPT | Performed by: EMERGENCY MEDICINE

## 2021-03-25 PROCEDURE — 87086 URINE CULTURE/COLONY COUNT: CPT | Performed by: EMERGENCY MEDICINE

## 2021-03-25 PROCEDURE — 96374 THER/PROPH/DIAG INJ IV PUSH: CPT

## 2021-03-25 PROCEDURE — 83690 ASSAY OF LIPASE: CPT | Performed by: EMERGENCY MEDICINE

## 2021-03-25 PROCEDURE — 99284 EMERGENCY DEPT VISIT MOD MDM: CPT

## 2021-03-25 PROCEDURE — 96361 HYDRATE IV INFUSION ADD-ON: CPT

## 2021-03-25 PROCEDURE — 71101 X-RAY EXAM UNILAT RIBS/CHEST: CPT | Performed by: EMERGENCY MEDICINE

## 2021-03-25 RX ORDER — METOCLOPRAMIDE 10 MG/1
10 TABLET ORAL 3 TIMES DAILY PRN
Qty: 20 TABLET | Refills: 0 | Status: SHIPPED | OUTPATIENT
Start: 2021-03-25 | End: 2021-04-23

## 2021-03-25 RX ORDER — ONDANSETRON 2 MG/ML
4 INJECTION INTRAMUSCULAR; INTRAVENOUS ONCE
Status: COMPLETED | OUTPATIENT
Start: 2021-03-25 | End: 2021-03-25

## 2021-03-25 NOTE — ED PROVIDER NOTES
Patient Seen in: [facility] Emergency Department In [place]      History   Patient presents with:  Fall  Nausea/Vomiting/Diarrhea: 2 days of diarrhea, vomiting since today     Stated Complaint: fall on monday right rib pain and left side hip    HPI/Subjec src Oral   SpO2 97 %   O2 Device        Current:BP (!) 178/80   Pulse 83   Temp 97 °F (36.1 °C) (Oral)   Resp 20   Ht 152.4 cm (5')   Wt 72.6 kg   SpO2 97%   BMI 31.25 kg/m²         Physical Exam    General appearance:  This is a older female lying on a gur orders were created for panel order CBC WITH DIFFERENTIAL WITH PLATELET.   Procedure                               Abnormality         Status                     ---------                               -----------         ------                     CBC W/ D Hyperglycemia.                          Disposition and Plan     Clinical Impression:  Contusion of right chest wall, initial encounter  (primary encounter diagnosis)  Nausea vomiting and diarrhea  Fall, initial encounter  Hyperglycemia    Disposition:  Dis

## 2021-04-16 NOTE — TELEPHONE ENCOUNTER
Insulin Glargine, 1 Unit Dial, (TOUJEO SOLOSTAR) 300 UNIT/ML Subcutaneous Solution Pen-injector 2 pen 0 1/29/2021    Sig:   Inject 30 units into the skin 2 times a day       [address] [facility], [address] [place] [address] Road [phone], [phone]

## 2021-04-19 RX ORDER — INSULIN GLARGINE 300 U/ML
INJECTION, SOLUTION SUBCUTANEOUS
Qty: 2 PEN | Refills: 0 | OUTPATIENT
Start: 2021-04-19

## 2021-04-19 NOTE — TELEPHONE ENCOUNTER
Requesting Insulin Glargine, 1 Unit Dial, (TOUJEO SOLOSTAR) 300 UNIT/ML Subcutaneous Solution Pen-injector  LOV: 12/11/20  RTC: 3 months  Last Relevant Labs: 2/3/21  Filled: 1/29/21 #2 pen with 0 refills    No future appointments.

## 2021-04-19 NOTE — TELEPHONE ENCOUNTER
Your Appointments    Friday April 23, 2021 11:00 AM  Follow Up Visit with Woo Benavides MD  [address] [facility]) [address] [phone]

## 2021-04-23 ENCOUNTER — OFFICE VISIT (OUTPATIENT)
Dept: INTERNAL MEDICINE CLINIC | Facility: CLINIC | Age: 71
End: 2021-04-23
Payer: MEDICARE

## 2021-04-23 VITALS
TEMPERATURE: 98 F | WEIGHT: 148.81 LBS | DIASTOLIC BLOOD PRESSURE: 62 MMHG | OXYGEN SATURATION: 98 % | SYSTOLIC BLOOD PRESSURE: 134 MMHG | RESPIRATION RATE: 16 BRPM | HEIGHT: 60 IN | HEART RATE: 68 BPM | BODY MASS INDEX: 29.22 KG/M2

## 2021-04-23 DIAGNOSIS — E11.59 HYPERTENSION ASSOCIATED WITH DIABETES (HCC): ICD-10-CM

## 2021-04-23 DIAGNOSIS — E78.5 HYPERLIPIDEMIA ASSOCIATED WITH TYPE 2 DIABETES MELLITUS (HCC): ICD-10-CM

## 2021-04-23 DIAGNOSIS — E11.29 DIABETES MELLITUS WITH ALBUMINURIA (HCC): ICD-10-CM

## 2021-04-23 DIAGNOSIS — E11.65 UNCONTROLLED INSULIN-TREATED TYPE 2 DIABETES MELLITUS (HCC): ICD-10-CM

## 2021-04-23 DIAGNOSIS — F33.1 MDD (MAJOR DEPRESSIVE DISORDER), RECURRENT EPISODE, MODERATE (HCC): ICD-10-CM

## 2021-04-23 DIAGNOSIS — E66.9 DIABETES MELLITUS TYPE 2 IN OBESE (HCC): ICD-10-CM

## 2021-04-23 DIAGNOSIS — Z12.11 SCREEN FOR COLON CANCER: ICD-10-CM

## 2021-04-23 DIAGNOSIS — K92.1 MELENA: ICD-10-CM

## 2021-04-23 DIAGNOSIS — D69.6 THROMBOCYTOPENIA (HCC): ICD-10-CM

## 2021-04-23 DIAGNOSIS — E66.3 OVERWEIGHT (BMI 25.0-29.9): ICD-10-CM

## 2021-04-23 DIAGNOSIS — Z79.4 UNCONTROLLED INSULIN-TREATED TYPE 2 DIABETES MELLITUS (HCC): ICD-10-CM

## 2021-04-23 DIAGNOSIS — R80.9 DIABETES MELLITUS WITH ALBUMINURIA (HCC): ICD-10-CM

## 2021-04-23 DIAGNOSIS — E11.69 HYPERLIPIDEMIA ASSOCIATED WITH TYPE 2 DIABETES MELLITUS (HCC): ICD-10-CM

## 2021-04-23 DIAGNOSIS — Z00.00 ROUTINE GENERAL MEDICAL EXAMINATION AT A HEALTH CARE FACILITY: Primary | ICD-10-CM

## 2021-04-23 DIAGNOSIS — E11.69 DIABETES MELLITUS TYPE 2 IN OBESE (HCC): ICD-10-CM

## 2021-04-23 DIAGNOSIS — D64.9 NORMOCYTIC ANEMIA: ICD-10-CM

## 2021-04-23 DIAGNOSIS — I15.2 HYPERTENSION ASSOCIATED WITH DIABETES (HCC): ICD-10-CM

## 2021-04-23 PROCEDURE — 3008F BODY MASS INDEX DOCD: CPT | Performed by: INTERNAL MEDICINE

## 2021-04-23 PROCEDURE — G0439 PPPS, SUBSEQ VISIT: HCPCS | Performed by: INTERNAL MEDICINE

## 2021-04-23 PROCEDURE — 99397 PER PM REEVAL EST PAT 65+ YR: CPT | Performed by: INTERNAL MEDICINE

## 2021-04-23 PROCEDURE — 3078F DIAST BP <80 MM HG: CPT | Performed by: INTERNAL MEDICINE

## 2021-04-23 PROCEDURE — 96160 PT-FOCUSED HLTH RISK ASSMT: CPT | Performed by: INTERNAL MEDICINE

## 2021-04-23 PROCEDURE — 3075F SYST BP GE 130 - 139MM HG: CPT | Performed by: INTERNAL MEDICINE

## 2021-04-23 RX ORDER — ATORVASTATIN CALCIUM 40 MG/1
40 TABLET, FILM COATED ORAL DAILY
Qty: 90 TABLET | Refills: 3 | Status: SHIPPED | OUTPATIENT
Start: 2021-04-23 | End: 2021-10-29

## 2021-04-23 RX ORDER — INSULIN GLARGINE 300 U/ML
36 INJECTION, SOLUTION SUBCUTANEOUS 2 TIMES DAILY
Qty: 2 PEN | Refills: 0 | Status: SHIPPED | OUTPATIENT
Start: 2021-04-23 | End: 2021-09-01

## 2021-04-23 RX ORDER — GLIMEPIRIDE 4 MG/1
8 TABLET ORAL
Qty: 180 TABLET | Refills: 1 | Status: SHIPPED | OUTPATIENT
Start: 2021-04-23 | End: 2021-10-29

## 2021-04-23 NOTE — PROGRESS NOTES
Naina Zamora is a 72year old female. HPI:   Patient presents for medicare supervisit and additional issues noted below. Comes in with daughter, Harish Duke who lives with Isma Romero.  sasha's  lives with her as well but she does not have a go (66.7 kg)  12/04/19 : 147 lb (66.7 kg)  11/13/19 : 147 lb (66.7 kg)        Penicillins             RASH    Family History   Problem Relation Age of Onset   • Cancer Father         stomach cancer   • Diabetes Other    • Diabetes Son    • Diabetes Sister caused constipation.   - She has not followed through with counseling though referrals have been provided. # Normocytic Anemia and Thrombocytopenia and melena: concern for colon cancer.  Referred to GI again and daughter now seems more willing to make thompson educated on dietary calcium/vit D3 weight bearing exercises   Vaccines: up to date with prevnar 13 and [id number]. Educated on shingrix and TDAP. Cont yearly flu shot. Hep C screening (born [id number]):  Ab neg 2019  Medical POA: daughter, Hilaria Malone

## 2021-04-23 NOTE — PATIENT INSTRUCTIONS
Aumente thompson insulina a 36 unidades cada noche y continúe con 32 unidades cada mañana pal 7 días. Luego aumente a 36 Google al día. Asegúrese de duane glimeperida 8 mg (dos comprimidos de 4 mg) todas las mañanas.     Tu colesterol está alto

## 2021-08-23 ENCOUNTER — TELEPHONE (OUTPATIENT)
Dept: INTERNAL MEDICINE CLINIC | Facility: CLINIC | Age: 71
End: 2021-08-23

## 2021-09-01 RX ORDER — INSULIN GLARGINE 300 U/ML
INJECTION, SOLUTION SUBCUTANEOUS
Qty: 30 ML | Refills: 0 | Status: SHIPPED | OUTPATIENT
Start: 2021-09-01 | End: 2021-10-29

## 2021-09-01 RX ORDER — INSULIN GLARGINE 300 U/ML
36 INJECTION, SOLUTION SUBCUTANEOUS 2 TIMES DAILY
Qty: 3 ML | Refills: 0 | Status: SHIPPED | OUTPATIENT
Start: 2021-09-01 | End: 2021-10-29

## 2021-09-01 NOTE — TELEPHONE ENCOUNTER
Insulin Glargine, 1 Unit Dial, (TOUJEO SOLOSTAR) 300 UNIT/ML Subcutaneous Solution Pen-injector 2 pen 0     Preferred Pharmacy    [address] [facility] [address] [facility] [address], [address] [facility] [address]   Phon

## 2021-10-26 NOTE — TELEPHONE ENCOUNTER
Patient called office and is requesting refills on her medications TOUJEO SOLOSTAR 300 UNIT/ML Subcutaneous Solution Pen-injector    metFORMIN HCl  MG Oral Tablet 24 Hr    Insulin Pen Needle (BD PEN NEEDLE ORIGINAL U/F) 29G X 12.7MM Does not apply Mi

## 2021-10-27 RX ORDER — PEN NEEDLE, DIABETIC 29 G X1/2"
NEEDLE, DISPOSABLE MISCELLANEOUS
Qty: 90 EACH | Refills: 11 | OUTPATIENT
Start: 2021-10-27

## 2021-10-27 RX ORDER — INSULIN GLARGINE 300 U/ML
36 INJECTION, SOLUTION SUBCUTANEOUS 2 TIMES DAILY
Qty: 3 ML | Refills: 0 | OUTPATIENT
Start: 2021-10-27

## 2021-10-27 RX ORDER — METFORMIN HYDROCHLORIDE 500 MG/1
1000 TABLET, EXTENDED RELEASE ORAL 2 TIMES DAILY WITH MEALS
Qty: 360 TABLET | Refills: 1 | OUTPATIENT
Start: 2021-10-27

## 2021-10-27 NOTE — TELEPHONE ENCOUNTER
Protocol passed   Insulin Pen Needle (BD PEN NEEDLE ORIGINAL U/F) 29G X 12.7MM filled 8/9/19 #90 11 refills       No Protocol   Insulin Glargine, 1 Unit Dial, (TOUJEO SOLOSTAR) 300 UNIT/ML Subcutaneous Solution Pen-injector filled 9/1/21     Protocol faile

## 2021-10-28 NOTE — TELEPHONE ENCOUNTER
Patient called office and I informed her that she was due for an OV and lab work before refilling her prescriptions.  She has now an appointment scheduled with Dr. Davonte Canales for 10/29

## 2021-10-29 ENCOUNTER — OFFICE VISIT (OUTPATIENT)
Dept: INTERNAL MEDICINE CLINIC | Facility: CLINIC | Age: 71
End: 2021-10-29
Payer: MEDICARE

## 2021-10-29 VITALS
OXYGEN SATURATION: 99 % | HEART RATE: 69 BPM | DIASTOLIC BLOOD PRESSURE: 70 MMHG | WEIGHT: 151.19 LBS | TEMPERATURE: 99 F | SYSTOLIC BLOOD PRESSURE: 140 MMHG | HEIGHT: 61 IN | RESPIRATION RATE: 16 BRPM | BODY MASS INDEX: 28.55 KG/M2

## 2021-10-29 DIAGNOSIS — Z79.4 UNCONTROLLED INSULIN-TREATED TYPE 2 DIABETES MELLITUS (HCC): Primary | ICD-10-CM

## 2021-10-29 DIAGNOSIS — E11.65 UNCONTROLLED INSULIN-TREATED TYPE 2 DIABETES MELLITUS (HCC): Primary | ICD-10-CM

## 2021-10-29 DIAGNOSIS — I15.2 HYPERTENSION ASSOCIATED WITH DIABETES (HCC): ICD-10-CM

## 2021-10-29 DIAGNOSIS — E11.59 HYPERTENSION ASSOCIATED WITH DIABETES (HCC): ICD-10-CM

## 2021-10-29 PROCEDURE — 3008F BODY MASS INDEX DOCD: CPT | Performed by: INTERNAL MEDICINE

## 2021-10-29 PROCEDURE — 99214 OFFICE O/P EST MOD 30 MIN: CPT | Performed by: INTERNAL MEDICINE

## 2021-10-29 PROCEDURE — 83036 HEMOGLOBIN GLYCOSYLATED A1C: CPT | Performed by: INTERNAL MEDICINE

## 2021-10-29 PROCEDURE — 3046F HEMOGLOBIN A1C LEVEL >9.0%: CPT | Performed by: INTERNAL MEDICINE

## 2021-10-29 PROCEDURE — 3078F DIAST BP <80 MM HG: CPT | Performed by: INTERNAL MEDICINE

## 2021-10-29 PROCEDURE — 3077F SYST BP >= 140 MM HG: CPT | Performed by: INTERNAL MEDICINE

## 2021-10-29 RX ORDER — BLOOD SUGAR DIAGNOSTIC
STRIP MISCELLANEOUS
Qty: 300 STRIP | Refills: 11 | Status: SHIPPED | OUTPATIENT
Start: 2021-10-29

## 2021-10-29 RX ORDER — INSULIN GLARGINE 300 U/ML
INJECTION, SOLUTION SUBCUTANEOUS
Qty: 12 EACH | Refills: 0 | Status: SHIPPED | OUTPATIENT
Start: 2021-10-29 | End: 2022-01-06

## 2021-10-29 RX ORDER — HYDROCHLOROTHIAZIDE 25 MG/1
25 TABLET ORAL DAILY
Qty: 90 TABLET | Refills: 0 | Status: SHIPPED | OUTPATIENT
Start: 2021-10-29

## 2021-10-29 RX ORDER — GLIMEPIRIDE 4 MG/1
8 TABLET ORAL
Qty: 180 TABLET | Refills: 1 | Status: SHIPPED | OUTPATIENT
Start: 2021-10-29

## 2021-10-29 RX ORDER — LOSARTAN POTASSIUM 50 MG/1
50 TABLET ORAL DAILY
Qty: 90 TABLET | Refills: 1 | Status: SHIPPED | OUTPATIENT
Start: 2021-10-29

## 2021-10-29 RX ORDER — PEN NEEDLE, DIABETIC 29 G X1/2"
NEEDLE, DISPOSABLE MISCELLANEOUS
Qty: 90 EACH | Refills: 11 | Status: SHIPPED | OUTPATIENT
Start: 2021-10-29

## 2021-10-29 RX ORDER — ATORVASTATIN CALCIUM 40 MG/1
40 TABLET, FILM COATED ORAL DAILY
Qty: 90 TABLET | Refills: 3 | Status: SHIPPED | OUTPATIENT
Start: 2021-10-29

## 2021-10-29 RX ORDER — EMPAGLIFLOZIN 25 MG/1
25 TABLET, FILM COATED ORAL DAILY
Qty: 90 TABLET | Refills: 0 | Status: SHIPPED | OUTPATIENT
Start: 2021-10-29

## 2021-10-29 NOTE — PROGRESS NOTES
Subjective:   Christal Shukla is a 80year old female with past medical history of uncontrolled DM c/b retinopathy, HLD, HTN who presents for Diabetes (Follow up; )     Of note she was supposed to follow-up with PCP in 7/2021 but did not.    She is also docu Take 25 mg by mouth daily. 90 tablet 0   • Blood Glucose Calibration (ACCU-CHEK MAME) In Vitro Solution USE AS DIRECTED TO CALIBRATE GLUCOSE MACHINE 1 each 0   • BD ULTRA-FINE LANCETS Does not apply Misc Use 3 x daily to inject insulin.  Dx E11.69, E78.2 1 Strip, DME DIABETIC TEST STRIPS AND SUPPLIES,   - OP  REFERRAL PROVIDER VISIT-DIABETES              (E11.59,  I15.2) Hypertension associated with diabetes ([facility] [address]  Plan:   Refilled hydroCHLOROthiazide 25 MG Oral Tab,  -resume losartan 50        MG Oral Tab (

## 2022-01-03 DIAGNOSIS — Z79.4 UNCONTROLLED INSULIN-TREATED TYPE 2 DIABETES MELLITUS (HCC): ICD-10-CM

## 2022-01-03 DIAGNOSIS — E11.65 UNCONTROLLED INSULIN-TREATED TYPE 2 DIABETES MELLITUS (HCC): ICD-10-CM

## 2022-01-03 NOTE — TELEPHONE ENCOUNTER
Pt called office and is requesting a refill on the following prescriptions.    Insulin Glargine, 1 Unit Dial, (TOUJEO SOLOSTAR) 300 UNIT/ML Subcutaneous Solution Pen-injector      [address] [facility], [address] [facility] [phone], [phone]

## 2022-01-05 NOTE — TELEPHONE ENCOUNTER
LOV: 10/29/2021 with Dr. Alanna Novak   RTC: 4 weeks  Last Relevant Labs: 10/29/2021 (A1C)  Filled: 10/29/2021   #12 each with 0 refills    No future appointments.

## 2022-01-06 RX ORDER — INSULIN GLARGINE 300 U/ML
INJECTION, SOLUTION SUBCUTANEOUS
Qty: 12 EACH | Refills: 0 | Status: SHIPPED | OUTPATIENT
Start: 2022-01-06

## 2022-01-11 ENCOUNTER — TELEMEDICINE (OUTPATIENT)
Dept: INTERNAL MEDICINE CLINIC | Facility: CLINIC | Age: 72
End: 2022-01-11

## 2022-01-11 DIAGNOSIS — R05.9 COUGH: ICD-10-CM

## 2022-01-11 DIAGNOSIS — J06.9 UPPER RESPIRATORY TRACT INFECTION, UNSPECIFIED TYPE: Primary | ICD-10-CM

## 2022-01-11 PROCEDURE — 99213 OFFICE O/P EST LOW 20 MIN: CPT | Performed by: INTERNAL MEDICINE

## 2022-01-11 RX ORDER — BENZONATATE 100 MG/1
CAPSULE ORAL 3 TIMES DAILY PRN
Qty: 36 CAPSULE | Refills: 0 | Status: SHIPPED | OUTPATIENT
Start: 2022-01-11

## 2022-01-11 NOTE — PROGRESS NOTES
Subjective:   Jimy Ssoa is a 80year old female  who presents for Cough/URI     Started with sore throat on 12/23/21. Initially had cough and myalgia and sore throat. Denies f/cp/sob. Reports temp being 98. 7. denies above 100F.    Reports having ha point review of systems was completed. Pertinent positives and negatives noted in the the HPI. Objective: There were no vitals taken for this visit.  Estimated body mass index is 28.57 kg/m² as calculated from the following:    Height as of 10/29/

## 2022-01-21 ENCOUNTER — TELEPHONE (OUTPATIENT)
Dept: INTERNAL MEDICINE CLINIC | Facility: CLINIC | Age: 72
End: 2022-01-21

## 2022-01-21 DIAGNOSIS — R80.9 DIABETES MELLITUS WITH ALBUMINURIA (HCC): Primary | ICD-10-CM

## 2022-01-21 DIAGNOSIS — E11.29 DIABETES MELLITUS WITH ALBUMINURIA (HCC): Primary | ICD-10-CM

## 2022-01-21 NOTE — TELEPHONE ENCOUNTER
Incoming referral request via fax from [facility]. Referral request form placed in [place] in basket.

## 2022-02-14 ENCOUNTER — TELEPHONE (OUTPATIENT)
Dept: INTERNAL MEDICINE CLINIC | Facility: CLINIC | Age: 72
End: 2022-02-14

## 2022-02-14 DIAGNOSIS — R05.9 COUGH: Primary | ICD-10-CM

## 2022-02-14 NOTE — TELEPHONE ENCOUNTER
Future Appointments   Date Time Provider Kwan Carmichael   2/17/2022  9:40 AM Tres Loera MD [facility] [place]

## 2022-02-14 NOTE — TELEPHONE ENCOUNTER
should complete cxr (order placed) and make appt.  In office appt as long as chronic cough; no fevers, chills etc

## 2022-02-14 NOTE — TELEPHONE ENCOUNTER
Spoke with patient advised CXR ordered, she will call central scheduling to set up appt. Please reachout to patient for scheduling.

## 2022-02-14 NOTE — TELEPHONE ENCOUNTER
Pt calling to update Dr. Chayo Mcgregor on her cough. Pt states still not going away. Pt says Dr. Chayo Mcgregor asked her to call for an update so that she can order an x-ray.

## 2022-02-17 ENCOUNTER — LAB ENCOUNTER (OUTPATIENT)
Dept: LAB | Age: 72
End: 2022-02-17
Attending: INTERNAL MEDICINE
Payer: MEDICARE

## 2022-02-17 ENCOUNTER — OFFICE VISIT (OUTPATIENT)
Dept: INTERNAL MEDICINE CLINIC | Facility: CLINIC | Age: 72
End: 2022-02-17
Payer: MEDICARE

## 2022-02-17 VITALS
SYSTOLIC BLOOD PRESSURE: 152 MMHG | TEMPERATURE: 99 F | DIASTOLIC BLOOD PRESSURE: 84 MMHG | WEIGHT: 151 LBS | BODY MASS INDEX: 30.85 KG/M2 | HEART RATE: 82 BPM | RESPIRATION RATE: 16 BRPM | HEIGHT: 58.5 IN | OXYGEN SATURATION: 99 %

## 2022-02-17 DIAGNOSIS — E11.59 HYPERTENSION ASSOCIATED WITH DIABETES (HCC): ICD-10-CM

## 2022-02-17 DIAGNOSIS — R12 HEARTBURN: ICD-10-CM

## 2022-02-17 DIAGNOSIS — I15.2 HYPERTENSION ASSOCIATED WITH DIABETES (HCC): ICD-10-CM

## 2022-02-17 DIAGNOSIS — D69.6 THROMBOCYTOPENIA (HCC): ICD-10-CM

## 2022-02-17 DIAGNOSIS — R06.00 DOE (DYSPNEA ON EXERTION): ICD-10-CM

## 2022-02-17 DIAGNOSIS — E11.3513 PROLIFERATIVE DIABETIC RETINOPATHY OF BOTH EYES WITH MACULAR EDEMA ASSOCIATED WITH TYPE 2 DIABETES MELLITUS (HCC): ICD-10-CM

## 2022-02-17 DIAGNOSIS — R05.3 CHRONIC COUGH: Primary | ICD-10-CM

## 2022-02-17 DIAGNOSIS — IMO0002 UNCONTROLLED INSULIN-TREATED TYPE 2 DIABETES MELLITUS: ICD-10-CM

## 2022-02-17 LAB
ALBUMIN SERPL-MCNC: 3.9 G/DL (ref 3.4–5)
ALBUMIN/GLOB SERPL: 1.1 {RATIO} (ref 1–2)
ALP LIVER SERPL-CCNC: 90 U/L
ALT SERPL-CCNC: 33 U/L
ANION GAP SERPL CALC-SCNC: 8 MMOL/L (ref 0–18)
AST SERPL-CCNC: 38 U/L (ref 15–37)
BASOPHILS # BLD AUTO: 0.04 X10(3) UL (ref 0–0.2)
BASOPHILS NFR BLD AUTO: 0.6 %
BILIRUB SERPL-MCNC: 0.5 MG/DL (ref 0.1–2)
BUN BLD-MCNC: 15 MG/DL (ref 7–18)
CALCIUM BLD-MCNC: 9.4 MG/DL (ref 8.5–10.1)
CARTRIDGE LOT#: ABNORMAL NUMERIC
CHLORIDE SERPL-SCNC: 105 MMOL/L (ref 98–112)
CHOLEST SERPL-MCNC: 224 MG/DL (ref ?–200)
CO2 SERPL-SCNC: 26 MMOL/L (ref 21–32)
CREAT BLD-MCNC: 0.86 MG/DL
CREAT UR-SCNC: 132 MG/DL
EOSINOPHIL # BLD AUTO: 0.15 X10(3) UL (ref 0–0.7)
EOSINOPHIL NFR BLD AUTO: 2.3 %
ERYTHROCYTE [DISTWIDTH] IN BLOOD BY AUTOMATED COUNT: 13.3 %
FASTING PATIENT LIPID ANSWER: YES
FASTING STATUS PATIENT QL REPORTED: YES
GLOBULIN PLAS-MCNC: 3.5 G/DL (ref 2.8–4.4)
GLUCOSE BLD-MCNC: 161 MG/DL (ref 70–99)
HCT VFR BLD AUTO: 37.2 %
HDLC SERPL-MCNC: 75 MG/DL (ref 40–59)
HGB BLD-MCNC: 12 G/DL
IMM GRANULOCYTES # BLD AUTO: 0.01 X10(3) UL (ref 0–1)
IMM GRANULOCYTES NFR BLD: 0.2 %
LDLC SERPL CALC-MCNC: 113 MG/DL (ref ?–100)
LYMPHOCYTES # BLD AUTO: 2.07 X10(3) UL (ref 1–4)
LYMPHOCYTES NFR BLD AUTO: 31.7 %
MCH RBC QN AUTO: 28 PG (ref 26–34)
MCHC RBC AUTO-ENTMCNC: 32.3 G/DL (ref 31–37)
MCV RBC AUTO: 86.9 FL
MICROALBUMIN UR-MCNC: 99.5 MG/DL
MICROALBUMIN/CREAT 24H UR-RTO: 753.8 UG/MG (ref ?–30)
MONOCYTES # BLD AUTO: 0.44 X10(3) UL (ref 0.1–1)
MONOCYTES NFR BLD AUTO: 6.7 %
NEUTROPHILS # BLD AUTO: 3.81 X10 (3) UL (ref 1.5–7.7)
NEUTROPHILS # BLD AUTO: 3.81 X10(3) UL (ref 1.5–7.7)
NEUTROPHILS NFR BLD AUTO: 58.5 %
NONHDLC SERPL-MCNC: 149 MG/DL (ref ?–130)
OSMOLALITY SERPL CALC.SUM OF ELEC: 292 MOSM/KG (ref 275–295)
PLATELET # BLD AUTO: 138 10(3)UL (ref 150–450)
POTASSIUM SERPL-SCNC: 3.8 MMOL/L (ref 3.5–5.1)
PROT SERPL-MCNC: 7.4 G/DL (ref 6.4–8.2)
RBC # BLD AUTO: 4.28 X10(6)UL
SODIUM SERPL-SCNC: 139 MMOL/L (ref 136–145)
TRIGL SERPL-MCNC: 214 MG/DL (ref 30–149)
TSI SER-ACNC: 3.42 MIU/ML (ref 0.36–3.74)
VLDLC SERPL CALC-MCNC: 37 MG/DL (ref 0–30)
WBC # BLD AUTO: 6.5 X10(3) UL (ref 4–11)

## 2022-02-17 PROCEDURE — 80061 LIPID PANEL: CPT

## 2022-02-17 PROCEDURE — 80053 COMPREHEN METABOLIC PANEL: CPT

## 2022-02-17 PROCEDURE — 85025 COMPLETE CBC W/AUTO DIFF WBC: CPT

## 2022-02-17 PROCEDURE — 3077F SYST BP >= 140 MM HG: CPT | Performed by: INTERNAL MEDICINE

## 2022-02-17 PROCEDURE — 3044F HG A1C LEVEL LT 7.0%: CPT | Performed by: INTERNAL MEDICINE

## 2022-02-17 PROCEDURE — 99215 OFFICE O/P EST HI 40 MIN: CPT | Performed by: INTERNAL MEDICINE

## 2022-02-17 PROCEDURE — 3008F BODY MASS INDEX DOCD: CPT | Performed by: INTERNAL MEDICINE

## 2022-02-17 PROCEDURE — 3060F POS MICROALBUMINURIA REV: CPT | Performed by: INTERNAL MEDICINE

## 2022-02-17 PROCEDURE — 3079F DIAST BP 80-89 MM HG: CPT | Performed by: INTERNAL MEDICINE

## 2022-02-17 PROCEDURE — 82043 UR ALBUMIN QUANTITATIVE: CPT

## 2022-02-17 PROCEDURE — 36415 COLL VENOUS BLD VENIPUNCTURE: CPT

## 2022-02-17 PROCEDURE — 82570 ASSAY OF URINE CREATININE: CPT

## 2022-02-17 PROCEDURE — 93000 ELECTROCARDIOGRAM COMPLETE: CPT | Performed by: INTERNAL MEDICINE

## 2022-02-17 PROCEDURE — 82607 VITAMIN B-12: CPT

## 2022-02-17 PROCEDURE — 83036 HEMOGLOBIN GLYCOSYLATED A1C: CPT | Performed by: INTERNAL MEDICINE

## 2022-02-17 PROCEDURE — 84443 ASSAY THYROID STIM HORMONE: CPT

## 2022-02-17 RX ORDER — GLUCOSAMINE HCL/CHONDROITIN SU 500-400 MG
CAPSULE ORAL
Qty: 100 EACH | Refills: 3 | Status: SHIPPED | OUTPATIENT
Start: 2022-02-17

## 2022-02-17 RX ORDER — GLUCOSAMINE HCL/CHONDROITIN SU 500-400 MG
CAPSULE ORAL
Qty: 200 STRIP | Refills: 3 | Status: SHIPPED | OUTPATIENT
Start: 2022-02-17

## 2022-02-17 RX ORDER — LANCETS 33 GAUGE
1 EACH MISCELLANEOUS 2 TIMES DAILY
Qty: 200 EACH | Refills: 3 | Status: SHIPPED | OUTPATIENT
Start: 2022-02-17

## 2022-02-17 RX ORDER — BENZONATATE 100 MG/1
100 CAPSULE ORAL 2 TIMES DAILY PRN
Qty: 30 CAPSULE | Refills: 0 | Status: SHIPPED | OUTPATIENT
Start: 2022-02-17

## 2022-02-17 RX ORDER — OMEPRAZOLE 40 MG/1
40 CAPSULE, DELAYED RELEASE ORAL DAILY
Qty: 90 CAPSULE | Refills: 0 | Status: SHIPPED | OUTPATIENT
Start: 2022-02-17

## 2022-02-18 LAB — VIT B12 SERPL-MCNC: 1209 PG/ML (ref 193–986)

## 2022-02-21 RX ORDER — INSULIN GLARGINE 300 U/ML
INJECTION, SOLUTION SUBCUTANEOUS
Qty: 12 EACH | Refills: 0 | Status: SHIPPED | OUTPATIENT
Start: 2022-02-21

## 2022-02-21 NOTE — TELEPHONE ENCOUNTER
LOV: 2/17/2022 with Dr. Davey Ferguson  RTC: 4 weeks  Last Relevant Labs: 2/17/2022  Filled: 1/6/2022   #12 each with 0 refills    No future appointments.

## 2022-03-16 NOTE — TELEPHONE ENCOUNTER
LOV: 2/17/2022 with Dr. Dino Singh   RTC: 4 weeks  Last Relevant Labs: 2/17/2022  Filled: 10/29/2021    #180 with 3 refills to [address] [address].      Future Appointments   Date Time Provider wKan Carmichael   3/21/2022  9:30 AM PF CARD NUC RM 2 PF CARD [place]

## 2022-03-16 NOTE — TELEPHONE ENCOUNTER
Pt requesting refill     metFORMIN HCl 1000 MG Oral Tab    [address] [place] [address], [phone]

## 2022-03-21 ENCOUNTER — HOSPITAL ENCOUNTER (OUTPATIENT)
Dept: CV DIAGNOSTICS | Age: 72
Discharge: HOME | End: 2022-03-21
Attending: INTERNAL MEDICINE
Payer: MEDICARE

## 2022-03-21 DIAGNOSIS — E11.59 HYPERTENSION ASSOCIATED WITH DIABETES (HCC): ICD-10-CM

## 2022-03-21 DIAGNOSIS — I15.2 HYPERTENSION ASSOCIATED WITH DIABETES (HCC): ICD-10-CM

## 2022-03-21 DIAGNOSIS — R06.00 DOE (DYSPNEA ON EXERTION): ICD-10-CM

## 2022-03-21 DIAGNOSIS — IMO0002 UNCONTROLLED INSULIN-TREATED TYPE 2 DIABETES MELLITUS: ICD-10-CM

## 2022-03-21 PROCEDURE — 93017 CV STRESS TEST TRACING ONLY: CPT | Performed by: INTERNAL MEDICINE

## 2022-03-21 PROCEDURE — 78452 HT MUSCLE IMAGE SPECT MULT: CPT | Performed by: INTERNAL MEDICINE

## 2022-03-21 PROCEDURE — 93018 CV STRESS TEST I&R ONLY: CPT | Performed by: INTERNAL MEDICINE

## 2022-04-12 RX ORDER — INSULIN GLARGINE 300 U/ML
INJECTION, SOLUTION SUBCUTANEOUS
Qty: 12 EACH | Refills: 0 | Status: SHIPPED | OUTPATIENT
Start: 2022-04-12

## 2022-04-12 NOTE — TELEPHONE ENCOUNTER
Pt requesting refill    Insulin Glargine, 1 Unit Dial, (TOUJEO SOLOSTAR) 300 UNIT/ML Subcutaneous Solution Pen-injector    [address] [facility], [address] [facility] [phone], [phone]     line used [id number]    Future Appointments   Date Time Provider Kwan Jany   4/18/2022 10:40 AM Burke Hooper MD [facility] [place]

## 2022-04-12 NOTE — TELEPHONE ENCOUNTER
LOV: 2/17/2022 with Dr. Suzette Torrez  RTC: 4 weeks  Last Relevant Labs: 2/17/2022  Filled: 2/21/2022    #12 each with 0 refills    Future Appointments   Date Time Provider Kwan Carmichael   4/18/2022 10:40 AM Deion Man MD [facility] [place]

## 2022-04-18 ENCOUNTER — OFFICE VISIT (OUTPATIENT)
Dept: INTERNAL MEDICINE CLINIC | Facility: CLINIC | Age: 72
End: 2022-04-18
Payer: MEDICARE

## 2022-04-18 VITALS
SYSTOLIC BLOOD PRESSURE: 150 MMHG | BODY MASS INDEX: 31.95 KG/M2 | HEIGHT: 58 IN | OXYGEN SATURATION: 97 % | DIASTOLIC BLOOD PRESSURE: 90 MMHG | HEART RATE: 79 BPM | TEMPERATURE: 98 F | WEIGHT: 152.19 LBS | RESPIRATION RATE: 16 BRPM

## 2022-04-18 DIAGNOSIS — I15.2 HYPERTENSION ASSOCIATED WITH DIABETES (HCC): Primary | ICD-10-CM

## 2022-04-18 DIAGNOSIS — D69.6 THROMBOCYTOPENIA (HCC): ICD-10-CM

## 2022-04-18 DIAGNOSIS — R79.89 ELEVATED LFTS: ICD-10-CM

## 2022-04-18 DIAGNOSIS — E11.3513 PROLIFERATIVE DIABETIC RETINOPATHY OF BOTH EYES WITH MACULAR EDEMA ASSOCIATED WITH TYPE 2 DIABETES MELLITUS (HCC): ICD-10-CM

## 2022-04-18 DIAGNOSIS — E11.59 HYPERTENSION ASSOCIATED WITH DIABETES (HCC): Primary | ICD-10-CM

## 2022-04-18 DIAGNOSIS — E78.5 HYPERLIPIDEMIA, UNSPECIFIED HYPERLIPIDEMIA TYPE: ICD-10-CM

## 2022-04-18 DIAGNOSIS — Z79.4 TYPE 2 DIABETES MELLITUS TREATED WITH INSULIN (HCC): ICD-10-CM

## 2022-04-18 DIAGNOSIS — K76.0 NAFLD (NONALCOHOLIC FATTY LIVER DISEASE): ICD-10-CM

## 2022-04-18 DIAGNOSIS — E11.9 TYPE 2 DIABETES MELLITUS TREATED WITH INSULIN (HCC): ICD-10-CM

## 2022-04-18 PROCEDURE — 3080F DIAST BP >= 90 MM HG: CPT | Performed by: INTERNAL MEDICINE

## 2022-04-18 PROCEDURE — 99214 OFFICE O/P EST MOD 30 MIN: CPT | Performed by: INTERNAL MEDICINE

## 2022-04-18 PROCEDURE — 3077F SYST BP >= 140 MM HG: CPT | Performed by: INTERNAL MEDICINE

## 2022-04-18 PROCEDURE — 3008F BODY MASS INDEX DOCD: CPT | Performed by: INTERNAL MEDICINE

## 2022-04-18 RX ORDER — BLOOD-GLUCOSE METER
EACH MISCELLANEOUS
COMMUNITY
Start: 2022-02-26

## 2022-04-18 RX ORDER — LOSARTAN POTASSIUM 100 MG/1
100 TABLET ORAL DAILY
Qty: 90 TABLET | Refills: 3 | Status: SHIPPED | OUTPATIENT
Start: 2022-04-18

## 2022-05-09 ENCOUNTER — TELEPHONE (OUTPATIENT)
Dept: INTERNAL MEDICINE CLINIC | Facility: CLINIC | Age: 72
End: 2022-05-09

## 2022-05-09 NOTE — TELEPHONE ENCOUNTER
Pt requesting referral to see her opthalmologist for a follow up.       [facility]  Ophthalmologist  [address] [facility] [address], [place], [place]  Phone: [phone]     line used [place] prashant [id number]

## 2022-05-18 DIAGNOSIS — I15.2 HYPERTENSION ASSOCIATED WITH DIABETES (HCC): ICD-10-CM

## 2022-05-18 DIAGNOSIS — E11.59 HYPERTENSION ASSOCIATED WITH DIABETES (HCC): ICD-10-CM

## 2022-05-18 NOTE — TELEPHONE ENCOUNTER
Pt requesting refill on  hydroCHLOROthiazide 25 MG Oral Tab.       [address] [facility], [address] [facility] [address] [phone], [facility] [place] [address]   Phone: [phone] Fax: [phone]

## 2022-05-19 RX ORDER — HYDROCHLOROTHIAZIDE 25 MG/1
25 TABLET ORAL DAILY
Qty: 90 TABLET | Refills: 0 | Status: SHIPPED | OUTPATIENT
Start: 2022-05-19

## 2022-05-25 ENCOUNTER — TELEPHONE (OUTPATIENT)
Dept: INTERNAL MEDICINE CLINIC | Facility: CLINIC | Age: 72
End: 2022-05-25

## 2022-05-25 ENCOUNTER — OFFICE VISIT (OUTPATIENT)
Dept: INTERNAL MEDICINE CLINIC | Facility: CLINIC | Age: 72
End: 2022-05-25
Payer: MEDICARE

## 2022-05-25 VITALS
TEMPERATURE: 98 F | HEIGHT: 58 IN | DIASTOLIC BLOOD PRESSURE: 80 MMHG | OXYGEN SATURATION: 97 % | RESPIRATION RATE: 16 BRPM | HEART RATE: 72 BPM | WEIGHT: 152.38 LBS | BODY MASS INDEX: 31.99 KG/M2 | SYSTOLIC BLOOD PRESSURE: 170 MMHG

## 2022-05-25 DIAGNOSIS — I15.2 HYPERTENSION ASSOCIATED WITH DIABETES (HCC): ICD-10-CM

## 2022-05-25 DIAGNOSIS — E11.9 TYPE 2 DIABETES MELLITUS TREATED WITH INSULIN (HCC): Primary | ICD-10-CM

## 2022-05-25 DIAGNOSIS — Z79.4 TYPE 2 DIABETES MELLITUS TREATED WITH INSULIN (HCC): Primary | ICD-10-CM

## 2022-05-25 DIAGNOSIS — E11.59 HYPERTENSION ASSOCIATED WITH DIABETES (HCC): ICD-10-CM

## 2022-05-25 DIAGNOSIS — F33.1 MDD (MAJOR DEPRESSIVE DISORDER), RECURRENT EPISODE, MODERATE (HCC): ICD-10-CM

## 2022-05-25 DIAGNOSIS — K76.0 NAFLD (NONALCOHOLIC FATTY LIVER DISEASE): ICD-10-CM

## 2022-05-25 DIAGNOSIS — Z12.31 ENCOUNTER FOR SCREENING MAMMOGRAM FOR MALIGNANT NEOPLASM OF BREAST: ICD-10-CM

## 2022-05-25 DIAGNOSIS — R79.89 ELEVATED LFTS: ICD-10-CM

## 2022-05-25 DIAGNOSIS — D69.6 THROMBOCYTOPENIA (HCC): ICD-10-CM

## 2022-05-25 DIAGNOSIS — E11.3513 PROLIFERATIVE DIABETIC RETINOPATHY OF BOTH EYES WITH MACULAR EDEMA ASSOCIATED WITH TYPE 2 DIABETES MELLITUS (HCC): ICD-10-CM

## 2022-05-25 DIAGNOSIS — Z12.11 COLON CANCER SCREENING: ICD-10-CM

## 2022-05-25 LAB
CARTRIDGE LOT#: 962 NUMERIC
HEMOGLOBIN A1C: 9.2 % (ref 4.3–5.6)

## 2022-05-25 PROCEDURE — 99214 OFFICE O/P EST MOD 30 MIN: CPT | Performed by: INTERNAL MEDICINE

## 2022-05-25 PROCEDURE — 83036 HEMOGLOBIN GLYCOSYLATED A1C: CPT | Performed by: INTERNAL MEDICINE

## 2022-05-25 RX ORDER — INSULIN GLARGINE 300 U/ML
INJECTION, SOLUTION SUBCUTANEOUS
Qty: 12 EACH | Refills: 1 | Status: SHIPPED | OUTPATIENT
Start: 2022-05-25

## 2022-05-25 RX ORDER — ESCITALOPRAM OXALATE 10 MG/1
10 TABLET ORAL AS DIRECTED
Qty: 90 TABLET | Refills: 0 | Status: SHIPPED | OUTPATIENT
Start: 2022-05-25

## 2022-05-25 RX ORDER — HYDRALAZINE HYDROCHLORIDE 25 MG/1
25 TABLET, FILM COATED ORAL 3 TIMES DAILY
Qty: 270 TABLET | Refills: 0 | Status: SHIPPED | OUTPATIENT
Start: 2022-05-25

## 2022-06-24 ENCOUNTER — TELEPHONE (OUTPATIENT)
Dept: INTERNAL MEDICINE CLINIC | Facility: CLINIC | Age: 72
End: 2022-06-24

## 2022-06-24 NOTE — TELEPHONE ENCOUNTER
As long as she is tolerating food and liquids, not vomiting, and passing gas and BMs then does not need to return to ED. I am unable to get her in sooner but she has seen Dr. Renna Rodriguez only for the past 1 year so I have also included Mauricio Alejo in this message.

## 2022-06-24 NOTE — TELEPHONE ENCOUNTER
Please advise. Thank you! ED 6/16/22    CT Scan  IMPRESSION:  1. Multiple abnormally dilated loops of small bowel suggesting small bowel obstruction. No definite transition point is identified although distal ileal small bowel loops in the right lower quadrant and pelvis appear to be decompressed. 2. Mesenteric edema with small amount of ascites. 3. No free air. 4. Cirrhotic liver. Admitted 6/17/22    Assessment  GREER: r/t prerenal azotemia   Small Bowel Obstruction   HTN   Dm2    Plan  Renal function recovered   Monitor UOP   On D5 1/2 NS + 20 meq KCL @ 75 ml/hr: wean off when able to take PO   NGT to LWS - plans to discontinue today   Bp stable  Meds reviewed  Seen w RN  D/w patient /family   BMP periodically   D/w Dr. Betancourt Choudrant agree with assessment and plan of care as outlined.

## 2022-06-24 NOTE — TELEPHONE ENCOUNTER
Scheduled appt -  grace    [facility] Appointments   Date Time Provider Kwan Carmichael   6/27/2022  4:20 PM Fallon Farmer MD [facility] [place]   7/1/2022 11:00 AM Celia Tony MD [facility] [place]

## 2022-06-24 NOTE — TELEPHONE ENCOUNTER
Language line used: Keila Dugan- [id number]    Spoke with patient she is eating very little food, able to drink water. Denies vomiting. She is having bowel movements some that are little other times diarrhea. Transferred to front to set up appointment with Dr. Jose A Parrish.

## 2022-06-24 NOTE — TELEPHONE ENCOUNTER
(Frisian speaking pt)     Pt requesting a sooner appointment for HFU. She c/o not feeling well. Pt struggles using the bathroom, she has been taking stool softeners but still feels discomfort in her stomach. Does pt need to go back to ER or can pt be seen sooner with another provider? F/u with recommendations. Pt has an appointment with Dr. Shanta Chery.      Future Appointments   Date Time Provider [facility]   7/1/2022 11:00 AM Feliciano Gomez MD [facility] [place]

## 2022-06-27 ENCOUNTER — OFFICE VISIT (OUTPATIENT)
Dept: INTERNAL MEDICINE CLINIC | Facility: CLINIC | Age: 72
End: 2022-06-27
Payer: MEDICARE

## 2022-06-27 VITALS
WEIGHT: 147.81 LBS | BODY MASS INDEX: 29.41 KG/M2 | HEIGHT: 59.45 IN | TEMPERATURE: 98 F | HEART RATE: 76 BPM | RESPIRATION RATE: 16 BRPM | SYSTOLIC BLOOD PRESSURE: 130 MMHG | OXYGEN SATURATION: 99 % | DIASTOLIC BLOOD PRESSURE: 60 MMHG

## 2022-06-27 DIAGNOSIS — Z79.4 TYPE 2 DIABETES MELLITUS WITH OTHER SPECIFIED COMPLICATION, WITH LONG-TERM CURRENT USE OF INSULIN (HCC): ICD-10-CM

## 2022-06-27 DIAGNOSIS — I15.2 HYPERTENSION ASSOCIATED WITH DIABETES (HCC): ICD-10-CM

## 2022-06-27 DIAGNOSIS — Z09 HOSPITAL DISCHARGE FOLLOW-UP: ICD-10-CM

## 2022-06-27 DIAGNOSIS — F33.1 MDD (MAJOR DEPRESSIVE DISORDER), RECURRENT EPISODE, MODERATE (HCC): ICD-10-CM

## 2022-06-27 DIAGNOSIS — E11.59 HYPERTENSION ASSOCIATED WITH DIABETES (HCC): ICD-10-CM

## 2022-06-27 DIAGNOSIS — Z12.11 COLON CANCER SCREENING: ICD-10-CM

## 2022-06-27 DIAGNOSIS — K74.60 HEPATIC CIRRHOSIS, UNSPECIFIED HEPATIC CIRRHOSIS TYPE, UNSPECIFIED WHETHER ASCITES PRESENT (HCC): Primary | ICD-10-CM

## 2022-06-27 DIAGNOSIS — K21.9 GASTROESOPHAGEAL REFLUX DISEASE, UNSPECIFIED WHETHER ESOPHAGITIS PRESENT: ICD-10-CM

## 2022-06-27 DIAGNOSIS — Z87.19 HISTORY OF SMALL BOWEL OBSTRUCTION: ICD-10-CM

## 2022-06-27 DIAGNOSIS — E11.69 TYPE 2 DIABETES MELLITUS WITH OTHER SPECIFIED COMPLICATION, WITH LONG-TERM CURRENT USE OF INSULIN (HCC): ICD-10-CM

## 2022-06-27 PROBLEM — E11.9 TYPE 2 DIABETES MELLITUS, WITH LONG-TERM CURRENT USE OF INSULIN (HCC): Status: ACTIVE | Noted: 2020-09-02

## 2022-06-27 PROBLEM — K56.609 INTESTINAL OBSTRUCTION (HCC): Status: ACTIVE | Noted: 2022-06-17

## 2022-06-27 PROBLEM — K76.0 NAFLD (NONALCOHOLIC FATTY LIVER DISEASE): Status: RESOLVED | Noted: 2019-01-28 | Resolved: 2022-06-27

## 2022-06-27 PROCEDURE — 1111F DSCHRG MED/CURRENT MED MERGE: CPT | Performed by: INTERNAL MEDICINE

## 2022-06-27 PROCEDURE — 99215 OFFICE O/P EST HI 40 MIN: CPT | Performed by: INTERNAL MEDICINE

## 2022-06-27 PROCEDURE — 3075F SYST BP GE 130 - 139MM HG: CPT | Performed by: INTERNAL MEDICINE

## 2022-06-27 PROCEDURE — 3078F DIAST BP <80 MM HG: CPT | Performed by: INTERNAL MEDICINE

## 2022-06-27 PROCEDURE — 3008F BODY MASS INDEX DOCD: CPT | Performed by: INTERNAL MEDICINE

## 2022-06-27 RX ORDER — OMEPRAZOLE 40 MG/1
40 CAPSULE, DELAYED RELEASE ORAL AS DIRECTED
Qty: 180 CAPSULE | Refills: 0 | Status: SHIPPED | OUTPATIENT
Start: 2022-06-27

## 2022-06-28 ENCOUNTER — TELEPHONE (OUTPATIENT)
Dept: INTERNAL MEDICINE CLINIC | Facility: CLINIC | Age: 72
End: 2022-06-28

## 2022-06-30 RX ORDER — INSULIN GLARGINE 300 U/ML
INJECTION, SOLUTION SUBCUTANEOUS
Qty: 12 EACH | Refills: 1 | Status: SHIPPED | OUTPATIENT
Start: 2022-06-30

## 2022-07-01 NOTE — TELEPHONE ENCOUNTER
Called Pharmacy and spoke with Nic Mejia about Medication refill request that was sent to us for [address].  He informed me that it an error on there side and they didn't mean to sent a refill request.

## 2022-07-14 ENCOUNTER — TELEPHONE (OUTPATIENT)
Dept: SURGERY | Facility: HOSPITAL | Age: 72
End: 2022-07-14

## 2022-07-14 NOTE — TELEPHONE ENCOUNTER
ASSESSMENT AND PLAN:   Selin Martinez is a 80year old female with michael umb abdominal pain. Symptoms last month with NV went to ER. SBO on CT better with NGT. Hx CCX KEV likely adhesion related. Pain is better but dull still. Need to rule out PUD, gastritis, colitis, CA, polyp, or functional etiology (IBS). Need to proceed with endoscopic evaluation with colonoscopy and EGD. Procedures explained, risks of bleeding, perforation discussed. Also with cirrhosis on imaging with fatty liver. Patient has BMI 28.5, diabetes, dyslipidemia and HTN with fat in liver on imaging. Patient does not consume a significant amount of alcohol and is not on any culprit medications. 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 Will eval varices as well. 1. Check viral hepatitis studies, anti-smooth muscle Ab  2. If above negative, then findings consistent with SALINAS/non-alcoholic fatty hepatitis. Will advise:                  -Continue treatment of diabetes and hypercholesterolemia. 3.  Patient will return for outpatient colonoscopy (Nulytely) and EGD with gastric Bx. EDH or ASC, no office Endo. Platelets > 539 no ascites.   4.  Start B Blocker if varices    Total time spent on patient care:  45 minutes    cc: Dr. Jaron Lockhart

## 2022-08-30 ENCOUNTER — TELEPHONE (OUTPATIENT)
Dept: INTERNAL MEDICINE CLINIC | Facility: CLINIC | Age: 72
End: 2022-08-30

## 2022-08-30 NOTE — TELEPHONE ENCOUNTER
Unable to reach via  on mobile; VMbox full. Reached patient on home # via  for medication adherence consult. Per insurance report, patient is past due for refill on losartan. After chart review, notice patient is past due for refills on several medications:     Atorvastatin last filled 10/29/21 #90  Jardiance - no dispense date found  Glimepiride last filled 10/29/21 #180   Losartan last filled 4/18/22 #90    Patient initially states she is still taking all of her medications and that she was given 1 years worth of medication and she has not run out yet. Let patient know that pharmacies can only dispense 3 months of medication at a time. Did review last refill dates with patient. Patient states she has an upcoming appt on Sept 12 and will plan to bring in all medication bottles for review with PCP. Did strongly encourage her to do this so PCP can be aware of the medication she has and is taking so medication list can be updated. Did provide education and discussed importance of taking medication just like prescribed to get the most benefit. Spent a significant amount of time discussing the consequences of uncontrolled diabetes, blood pressure and cholesterol. Patient does admit to forgetting to take her medications \"usually forgets\". She states she lives in an upstairs room and will often not want to come down to get her medications so she misses doses. Asked if patient can move her medications to her room and keep them in a visible spot as a way to help remind her to take them more regularly. Patient willing to try this. Patient also mentions she has not been checking her blood sugars x1 month because the meter Dr. Mag Garcia sent in was not covered by insurance. She ended up paying for a different meter, but is unable to use it because does not have correct strips. Let her know that [facility] will usually cover Accu-chek and One Touch.  She believes she currently has Accu-chek and does not think it was covered. Strongly encouraged her to discuss with PCP; will send message as well as I believe the One Touch meter should be covered. Again, strongly encouraged patient to bring in all med bottles for review by PCP. Also again encouraged patient to get back on track with taking her medications just like prescribed to get the most benefit and protect from heart attacks and strokes. Patient denies any other questions or concerns with medications at this time.

## 2022-09-02 NOTE — TELEPHONE ENCOUNTER
Attempted to contact pt, not able to leave a message, mailbox is full. FOV scheduled 09/12/2022    Tres Loera MD  [facility] 3 days ago     DV    Please follow-up with patient regarding glucometer supplies needed and upload if needs new supplies. Needs to keep follow-up appt.

## 2022-09-06 ENCOUNTER — LAB ENCOUNTER (OUTPATIENT)
Dept: LAB | Age: 72
End: 2022-09-06
Attending: INTERNAL MEDICINE
Payer: MEDICARE

## 2022-09-06 DIAGNOSIS — Z01.812 ENCOUNTER FOR PREPROCEDURE SCREENING LABORATORY TESTING FOR COVID-19: ICD-10-CM

## 2022-09-06 DIAGNOSIS — Z20.822 ENCOUNTER FOR PREPROCEDURE SCREENING LABORATORY TESTING FOR COVID-19: ICD-10-CM

## 2022-09-07 LAB — SARS-COV-2 RNA RESP QL NAA+PROBE: NOT DETECTED

## 2022-09-08 ENCOUNTER — PATIENT MESSAGE (OUTPATIENT)
Dept: INTERNAL MEDICINE CLINIC | Facility: CLINIC | Age: 72
End: 2022-09-08

## 2022-09-08 NOTE — TELEPHONE ENCOUNTER
Regarding DM medication:   Should hold metformin, glimepiride and jardiance the morning of procedure. Should also be holding glimepiride today since fasting. Glargine can be reduced to 35 units for tonight and tomorrow morning.

## 2022-09-08 NOTE — TELEPHONE ENCOUNTER
From: Cherry Martinez  To: Carla Meza MD  Sent: 9/8/2022 12:12 PM CDT  Subject: Insulin question before procedure    Hello I'm not sure if this message should be for  or Ntete Christina. My mom is scheduled for a colonoscopy and endoscopy tomorrow morning and the nurses at the hospital, gave us instructions about her meds that she should only take BP and or heart meds. But they told us to contact you for instructions on her insulin for tonight and tomorrow morning? please advise and thank you!

## 2022-09-09 ENCOUNTER — HOSPITAL ENCOUNTER (OUTPATIENT)
Facility: HOSPITAL | Age: 72
Setting detail: HOSPITAL OUTPATIENT SURGERY
Discharge: HOME | End: 2022-09-09
Attending: INTERNAL MEDICINE | Admitting: INTERNAL MEDICINE
Payer: MEDICARE

## 2022-09-09 ENCOUNTER — ANESTHESIA (OUTPATIENT)
Dept: ENDOSCOPY | Facility: HOSPITAL | Age: 72
End: 2022-09-09
Payer: MEDICARE

## 2022-09-09 ENCOUNTER — ANESTHESIA EVENT (OUTPATIENT)
Dept: ENDOSCOPY | Facility: HOSPITAL | Age: 72
End: 2022-09-09
Payer: MEDICARE

## 2022-09-09 VITALS
DIASTOLIC BLOOD PRESSURE: 55 MMHG | HEIGHT: 59 IN | HEART RATE: 66 BPM | RESPIRATION RATE: 20 BRPM | SYSTOLIC BLOOD PRESSURE: 143 MMHG | WEIGHT: 147 LBS | BODY MASS INDEX: 29.64 KG/M2 | TEMPERATURE: 98 F | OXYGEN SATURATION: 98 %

## 2022-09-09 DIAGNOSIS — Z20.822 ENCOUNTER FOR PREPROCEDURE SCREENING LABORATORY TESTING FOR COVID-19: Primary | ICD-10-CM

## 2022-09-09 DIAGNOSIS — Z01.812 ENCOUNTER FOR PREPROCEDURE SCREENING LABORATORY TESTING FOR COVID-19: Primary | ICD-10-CM

## 2022-09-09 LAB — GLUCOSE BLD-MCNC: 140 MG/DL (ref 70–99)

## 2022-09-09 PROCEDURE — 0DJD8ZZ INSPECTION OF LOWER INTESTINAL TRACT, VIA NATURAL OR ARTIFICIAL OPENING ENDOSCOPIC: ICD-10-PCS | Performed by: INTERNAL MEDICINE

## 2022-09-09 PROCEDURE — 88305 TISSUE EXAM BY PATHOLOGIST: CPT | Performed by: INTERNAL MEDICINE

## 2022-09-09 PROCEDURE — 88342 IMHCHEM/IMCYTCHM 1ST ANTB: CPT | Performed by: INTERNAL MEDICINE

## 2022-09-09 PROCEDURE — 0DB78ZX EXCISION OF STOMACH, PYLORUS, VIA NATURAL OR ARTIFICIAL OPENING ENDOSCOPIC, DIAGNOSTIC: ICD-10-PCS | Performed by: INTERNAL MEDICINE

## 2022-09-09 PROCEDURE — 82962 GLUCOSE BLOOD TEST: CPT

## 2022-09-09 RX ORDER — SODIUM CHLORIDE, SODIUM LACTATE, POTASSIUM CHLORIDE, CALCIUM CHLORIDE 600; 310; 30; 20 MG/100ML; MG/100ML; MG/100ML; MG/100ML
INJECTION, SOLUTION INTRAVENOUS CONTINUOUS
Status: DISCONTINUED | OUTPATIENT
Start: 2022-09-09 | End: 2022-09-09

## 2022-09-09 RX ORDER — NICOTINE POLACRILEX 4 MG
15 LOZENGE BUCCAL
Status: DISCONTINUED | OUTPATIENT
Start: 2022-09-09 | End: 2022-09-09

## 2022-09-09 RX ORDER — LIDOCAINE HYDROCHLORIDE 10 MG/ML
INJECTION, SOLUTION EPIDURAL; INFILTRATION; INTRACAUDAL; PERINEURAL AS NEEDED
Status: DISCONTINUED | OUTPATIENT
Start: 2022-09-09 | End: 2022-09-09 | Stop reason: SURG

## 2022-09-09 RX ORDER — DEXTROSE MONOHYDRATE 25 G/50ML
50 INJECTION, SOLUTION INTRAVENOUS
Status: DISCONTINUED | OUTPATIENT
Start: 2022-09-09 | End: 2022-09-09

## 2022-09-09 RX ORDER — NICOTINE POLACRILEX 4 MG
30 LOZENGE BUCCAL
Status: DISCONTINUED | OUTPATIENT
Start: 2022-09-09 | End: 2022-09-09

## 2022-09-09 RX ORDER — NALOXONE HYDROCHLORIDE 0.4 MG/ML
80 INJECTION, SOLUTION INTRAMUSCULAR; INTRAVENOUS; SUBCUTANEOUS AS NEEDED
Status: DISCONTINUED | OUTPATIENT
Start: 2022-09-09 | End: 2022-09-09

## 2022-09-09 RX ADMIN — LIDOCAINE HYDROCHLORIDE 50 MG: 10 INJECTION, SOLUTION EPIDURAL; INFILTRATION; INTRACAUDAL; PERINEURAL at 08:54:00

## 2022-09-09 RX ADMIN — SODIUM CHLORIDE, SODIUM LACTATE, POTASSIUM CHLORIDE, CALCIUM CHLORIDE: 600; 310; 30; 20 INJECTION, SOLUTION INTRAVENOUS at 09:22:00

## 2022-09-09 RX ADMIN — SODIUM CHLORIDE, SODIUM LACTATE, POTASSIUM CHLORIDE, CALCIUM CHLORIDE: 600; 310; 30; 20 INJECTION, SOLUTION INTRAVENOUS at 08:54:00

## 2022-09-09 NOTE — TELEPHONE ENCOUNTER
From: Jaswant Fraga  Sent: 9/8/2022 10:05 PM CDT  To: [facility] Clinical Staff  Subject: Insulin question before procedure    7this is ebonie you can reach me and or text me at [phone]

## 2022-09-09 NOTE — OPERATIVE REPORT
[facility] Patient Status:  Hospital Outpatient Surgery    1950 MRN [id number]   Location [address] [place] [address] Attending Radha Floyd MD   Hosp Day # 0 PCP Tao Lemus MD         PATIENT NAME: Chuy Roque OF OPERATION: 2022    PREOPERATIVE DIAGNOSIS:  1. SALINAS cirrhosis, varices screening  2. Florecita umb abd pain  POSTOPERATIVE DIAGNOSIS:  1. Grade 0-1 esophageal varies. Gastric biopsies taken  2. Normal colon    PROCEDURE PERFORMED: Upper endoscopy, colonoscopy with MAC sedation  SURGEON: Abhijeet Morales MD   MEDICATIONS: MAC IV in divided doses under the supervision of Anesthesia. PROCEDURE AND FINDINGS: The patient was placed into the left lateral decubitus position after informed consent was obtained. All questions were answered. MAC IV sedation was administered. The Olympus video gastroscope was introduced into the mouth and passed to the third portion of the duodenum. There were Grade 0-1 esophageal varices seen. The remainder of the entire examined esophagus was normal.  The entire examined stomach,  including retroflexion view, was normal. Biopsies were taken with a cold forceps. The entire examined duodenum was normal.   The gastroscope was removed from the patient. The procedure was completed. The patient tolerated the procedure well. The patient was then placed into position for the colonoscopy. Further IV sedation was administered. A digital rectal exam was performed which was normal.  The Olympus video colonoscope was then introduced through the rectum and advanced through the colon to the cecum. The quality of prep was excellent, adequate, Aronchick 1. The cecum was identified by the ileocecal valve and appendiceal orifice. The colonoscope was then withdrawn and the mucosa was further carefully inspected.   The entire examined colon was otherwise normal.  After retroflexion in the rectum, the colonoscope was straightened and removed and the procedure was completed. The patient tolerated the procedure well. There were no implants placed nor significant blood loss. There were no immediate apparent complications. Anesthesia was present to assist in monitoring the patient during the entire length of the moderate sedation time. RECOMMENDATIONS   Follow up biopsies. Start Atenolol 25 mg daily  Consume a high fiber diet. No repeat colonoscopy needed. Ector Mckeon MD

## 2022-09-09 NOTE — TELEPHONE ENCOUNTER
Spoke with daughter Joann Agee already in procedure. Also clarified Toujeo and Insulin Glargine are the same medication. Verbalized understanding.

## 2022-09-12 ENCOUNTER — LAB ENCOUNTER (OUTPATIENT)
Dept: LAB | Age: 72
End: 2022-09-12
Attending: INTERNAL MEDICINE
Payer: MEDICARE

## 2022-09-12 ENCOUNTER — OFFICE VISIT (OUTPATIENT)
Dept: INTERNAL MEDICINE CLINIC | Facility: CLINIC | Age: 72
End: 2022-09-12
Payer: MEDICARE

## 2022-09-12 VITALS
OXYGEN SATURATION: 99 % | WEIGHT: 144.81 LBS | BODY MASS INDEX: 29.58 KG/M2 | HEIGHT: 58.5 IN | TEMPERATURE: 98 F | HEART RATE: 79 BPM | SYSTOLIC BLOOD PRESSURE: 120 MMHG | RESPIRATION RATE: 16 BRPM | DIASTOLIC BLOOD PRESSURE: 59 MMHG

## 2022-09-12 DIAGNOSIS — E78.5 HYPERLIPIDEMIA ASSOCIATED WITH TYPE 2 DIABETES MELLITUS (HCC): ICD-10-CM

## 2022-09-12 DIAGNOSIS — K74.60 HEPATIC CIRRHOSIS, UNSPECIFIED HEPATIC CIRRHOSIS TYPE, UNSPECIFIED WHETHER ASCITES PRESENT (HCC): ICD-10-CM

## 2022-09-12 DIAGNOSIS — D64.9 NORMOCYTIC ANEMIA: ICD-10-CM

## 2022-09-12 DIAGNOSIS — Z79.4 INSULIN DEPENDENT TYPE 2 DIABETES MELLITUS (HCC): ICD-10-CM

## 2022-09-12 DIAGNOSIS — E11.9 INSULIN DEPENDENT TYPE 2 DIABETES MELLITUS (HCC): ICD-10-CM

## 2022-09-12 DIAGNOSIS — R80.9 DIABETES MELLITUS WITH ALBUMINURIA (HCC): Primary | ICD-10-CM

## 2022-09-12 DIAGNOSIS — F33.1 MDD (MAJOR DEPRESSIVE DISORDER), RECURRENT EPISODE, MODERATE (HCC): ICD-10-CM

## 2022-09-12 DIAGNOSIS — E11.29 DIABETES MELLITUS WITH ALBUMINURIA (HCC): Primary | ICD-10-CM

## 2022-09-12 DIAGNOSIS — I15.2 HYPERTENSION ASSOCIATED WITH DIABETES (HCC): ICD-10-CM

## 2022-09-12 DIAGNOSIS — E11.3513 PROLIFERATIVE DIABETIC RETINOPATHY OF BOTH EYES WITH MACULAR EDEMA ASSOCIATED WITH TYPE 2 DIABETES MELLITUS (HCC): ICD-10-CM

## 2022-09-12 DIAGNOSIS — E11.69 HYPERLIPIDEMIA ASSOCIATED WITH TYPE 2 DIABETES MELLITUS (HCC): ICD-10-CM

## 2022-09-12 DIAGNOSIS — Z00.00 ROUTINE GENERAL MEDICAL EXAMINATION AT A HEALTH CARE FACILITY: ICD-10-CM

## 2022-09-12 DIAGNOSIS — E66.3 OVERWEIGHT (BMI 25.0-29.9): ICD-10-CM

## 2022-09-12 DIAGNOSIS — Z12.31 VISIT FOR SCREENING MAMMOGRAM: ICD-10-CM

## 2022-09-12 DIAGNOSIS — Z78.0 POSTMENOPAUSAL: ICD-10-CM

## 2022-09-12 DIAGNOSIS — E11.69 TYPE 2 DIABETES MELLITUS WITH OTHER SPECIFIED COMPLICATION, WITH LONG-TERM CURRENT USE OF INSULIN (HCC): ICD-10-CM

## 2022-09-12 DIAGNOSIS — E11.59 HYPERTENSION ASSOCIATED WITH DIABETES (HCC): ICD-10-CM

## 2022-09-12 DIAGNOSIS — Z79.4 TYPE 2 DIABETES MELLITUS WITH OTHER SPECIFIED COMPLICATION, WITH LONG-TERM CURRENT USE OF INSULIN (HCC): ICD-10-CM

## 2022-09-12 DIAGNOSIS — E11.65 UNCONTROLLED TYPE 2 DIABETES MELLITUS WITH HYPERGLYCEMIA (HCC): ICD-10-CM

## 2022-09-12 LAB
CARTRIDGE LOT#: ABNORMAL NUMERIC
CHOLEST SERPL-MCNC: 157 MG/DL (ref ?–200)
DEPRECATED HBV CORE AB SER IA-ACNC: 34 NG/ML
FASTING PATIENT LIPID ANSWER: YES
FOLATE SERPL-MCNC: 32.5 NG/ML (ref 8.7–?)
HDLC SERPL-MCNC: 78 MG/DL (ref 40–59)
HEMOGLOBIN A1C: 8.7 % (ref 4.3–5.6)
IRON SATN MFR SERPL: 14 %
IRON SERPL-MCNC: 56 UG/DL
LDLC SERPL CALC-MCNC: 55 MG/DL (ref ?–100)
NONHDLC SERPL-MCNC: 79 MG/DL (ref ?–130)
TIBC SERPL-MCNC: 408 UG/DL (ref 240–450)
TRANSFERRIN SERPL-MCNC: 274 MG/DL (ref 200–360)
TRIGL SERPL-MCNC: 143 MG/DL (ref 30–149)
VLDLC SERPL CALC-MCNC: 21 MG/DL (ref 0–30)

## 2022-09-12 PROCEDURE — 85025 COMPLETE CBC W/AUTO DIFF WBC: CPT

## 2022-09-12 PROCEDURE — 82746 ASSAY OF FOLIC ACID SERUM: CPT

## 2022-09-12 PROCEDURE — 83021 HEMOGLOBIN CHROMOTOGRAPHY: CPT

## 2022-09-12 PROCEDURE — 83020 HEMOGLOBIN ELECTROPHORESIS: CPT

## 2022-09-12 PROCEDURE — 83550 IRON BINDING TEST: CPT

## 2022-09-12 PROCEDURE — 82728 ASSAY OF FERRITIN: CPT

## 2022-09-12 PROCEDURE — 83540 ASSAY OF IRON: CPT

## 2022-09-12 PROCEDURE — 36415 COLL VENOUS BLD VENIPUNCTURE: CPT

## 2022-09-12 PROCEDURE — 80061 LIPID PANEL: CPT

## 2022-09-12 RX ORDER — LANCETS 33 GAUGE
1 EACH MISCELLANEOUS 2 TIMES DAILY
Qty: 200 EACH | Refills: 3 | Status: SHIPPED | OUTPATIENT
Start: 2022-09-12

## 2022-09-12 RX ORDER — GLUCOSAMINE HCL/CHONDROITIN SU 500-400 MG
CAPSULE ORAL
Qty: 200 STRIP | Refills: 3 | Status: SHIPPED | OUTPATIENT
Start: 2022-09-12

## 2022-09-12 RX ORDER — HYDROCHLOROTHIAZIDE 25 MG/1
25 TABLET ORAL DAILY
Qty: 90 TABLET | Refills: 1 | Status: SHIPPED | OUTPATIENT
Start: 2022-09-12

## 2022-09-12 RX ORDER — EMPAGLIFLOZIN 25 MG/1
25 TABLET, FILM COATED ORAL DAILY
Qty: 90 TABLET | Refills: 3 | Status: SHIPPED | OUTPATIENT
Start: 2022-09-12

## 2022-09-12 RX ORDER — INSULIN GLARGINE 300 U/ML
INJECTION, SOLUTION SUBCUTANEOUS
Qty: 12 EACH | Refills: 1 | Status: SHIPPED | OUTPATIENT
Start: 2022-09-12

## 2022-09-12 RX ORDER — HYDRALAZINE HYDROCHLORIDE 25 MG/1
25 TABLET, FILM COATED ORAL 2 TIMES DAILY
Qty: 180 TABLET | Refills: 3 | Status: SHIPPED | OUTPATIENT
Start: 2022-09-12

## 2022-09-12 RX ORDER — GLIMEPIRIDE 4 MG/1
8 TABLET ORAL
Qty: 180 TABLET | Refills: 1 | Status: SHIPPED | OUTPATIENT
Start: 2022-09-12

## 2022-09-13 ENCOUNTER — TELEPHONE (OUTPATIENT)
Dept: INTERNAL MEDICINE CLINIC | Facility: CLINIC | Age: 72
End: 2022-09-13

## 2022-09-13 NOTE — TELEPHONE ENCOUNTER
Incoming diabetic eye exam voa fax from [facility]. Flowsheet has been updated, report review and signed by KS.   Report sent to scan

## 2022-09-14 LAB
HGB A2 MFR BLD: 2.5 % (ref 1.5–3.5)
HGB PNL BLD ELPH: 97.5 % (ref 95.5–100)

## 2022-09-16 ENCOUNTER — TELEPHONE (OUTPATIENT)
Dept: INTERNAL MEDICINE CLINIC | Facility: CLINIC | Age: 72
End: 2022-09-16

## 2022-09-16 NOTE — TELEPHONE ENCOUNTER
[facility] called stating  added an order for CBC, she states the pts sample is too old and she will need to come back to complete it. She would like us to notify the patient and  for her further recommendations for the pt at this time. Lab does not need a call back, pt is Romansh speaking.

## 2022-09-16 NOTE — TELEPHONE ENCOUNTER
N received referral to call patient and f/u post hospital d/c.  SRN called introduced self and reason for call.  Asher states \" I'm back to normal. He has a good appetite, denies any nausea, vomiting, diarrhea, or constipation. Having daily BM, formed brown stool. Denies any abd pain, or distention.  Passing flatus. He is independent, and knowledgeable of medication, name, use, dose and times to take them.   He reports not taking Prednisone ,Flexeril , zofran, or amlodipine.  Will send note to doctor Baldwin.  He reports checking blood sugars two times a day, rotating insulin injection sites. Last evening blood sugar was 200, he uses Humalog per sliding scale.  He has not checked blood sugar as of today.  He is also knowledgeable of the s/s hypo and hyper glycemia and how to manage s/s.  He does report arthritis pain, back, neck, hips, shoulders, takes baclofen, with very little pain control.  He states his pain level is at a #7 on the 1-10 pain scale.  Writer suggested he contact pain clinic and discuss some other options to manage pain. SRN provided him with name and number and encouraged him to call with any other questions or if was in need of some community resources.  He appreciated the call.       Relayed need for patient to return to lab for blood draw. Verbalized understanding.

## 2022-09-19 ENCOUNTER — LAB ENCOUNTER (OUTPATIENT)
Dept: LAB | Age: 72
End: 2022-09-19
Attending: INTERNAL MEDICINE

## 2022-09-19 DIAGNOSIS — D50.0 IRON DEFICIENCY ANEMIA DUE TO CHRONIC BLOOD LOSS: ICD-10-CM

## 2022-09-19 LAB
BASOPHILS # BLD AUTO: 0.08 X10(3) UL (ref 0–0.2)
BASOPHILS NFR BLD AUTO: 1.1 %
EOSINOPHIL # BLD AUTO: 0.12 X10(3) UL (ref 0–0.7)
EOSINOPHIL NFR BLD AUTO: 1.6 %
ERYTHROCYTE [DISTWIDTH] IN BLOOD BY AUTOMATED COUNT: 13.2 %
HCT VFR BLD AUTO: 37.4 %
HGB BLD-MCNC: 11.9 G/DL
IMM GRANULOCYTES # BLD AUTO: 0.07 X10(3) UL (ref 0–1)
IMM GRANULOCYTES NFR BLD: 0.9 %
LYMPHOCYTES # BLD AUTO: 2.18 X10(3) UL (ref 1–4)
LYMPHOCYTES NFR BLD AUTO: 29.1 %
MCH RBC QN AUTO: 29.2 PG (ref 26–34)
MCHC RBC AUTO-ENTMCNC: 31.8 G/DL (ref 31–37)
MCV RBC AUTO: 91.7 FL
MONOCYTES # BLD AUTO: 0.47 X10(3) UL (ref 0.1–1)
MONOCYTES NFR BLD AUTO: 6.3 %
NEUTROPHILS # BLD AUTO: 4.56 X10 (3) UL (ref 1.5–7.7)
NEUTROPHILS # BLD AUTO: 4.56 X10(3) UL (ref 1.5–7.7)
NEUTROPHILS NFR BLD AUTO: 61 %
PLATELET # BLD AUTO: 145 10(3)UL (ref 150–450)
RBC # BLD AUTO: 4.08 X10(6)UL
WBC # BLD AUTO: 7.5 X10(3) UL (ref 4–11)

## 2022-09-19 PROCEDURE — 36415 COLL VENOUS BLD VENIPUNCTURE: CPT

## 2022-09-19 PROCEDURE — 85025 COMPLETE CBC W/AUTO DIFF WBC: CPT

## 2022-10-31 ENCOUNTER — HOSPITAL ENCOUNTER (EMERGENCY)
Facility: HOSPITAL | Age: 72
Discharge: HOME | End: 2022-10-31
Attending: EMERGENCY MEDICINE
Payer: MEDICARE

## 2022-10-31 ENCOUNTER — APPOINTMENT (OUTPATIENT)
Dept: CT IMAGING | Facility: HOSPITAL | Age: 72
End: 2022-10-31
Attending: EMERGENCY MEDICINE
Payer: MEDICARE

## 2022-10-31 VITALS
BODY MASS INDEX: 27.48 KG/M2 | TEMPERATURE: 98 F | WEIGHT: 140 LBS | HEART RATE: 73 BPM | HEIGHT: 60 IN | RESPIRATION RATE: 17 BRPM | SYSTOLIC BLOOD PRESSURE: 168 MMHG | OXYGEN SATURATION: 99 % | DIASTOLIC BLOOD PRESSURE: 77 MMHG

## 2022-10-31 DIAGNOSIS — N12 PYELONEPHRITIS: Primary | ICD-10-CM

## 2022-10-31 LAB
ALBUMIN SERPL-MCNC: 3.8 G/DL (ref 3.4–5)
ALBUMIN/GLOB SERPL: 0.9 {RATIO} (ref 1–2)
ALP LIVER SERPL-CCNC: 95 U/L
ALT SERPL-CCNC: 32 U/L
ANION GAP SERPL CALC-SCNC: 5 MMOL/L (ref 0–18)
AST SERPL-CCNC: 21 U/L (ref 15–37)
BASOPHILS # BLD AUTO: 0.06 X10(3) UL (ref 0–0.2)
BASOPHILS NFR BLD AUTO: 0.7 %
BILIRUB SERPL-MCNC: 0.6 MG/DL (ref 0.1–2)
BILIRUB UR QL STRIP.AUTO: NEGATIVE
BUN BLD-MCNC: 16 MG/DL (ref 7–18)
CALCIUM BLD-MCNC: 9.2 MG/DL (ref 8.5–10.1)
CHLORIDE SERPL-SCNC: 106 MMOL/L (ref 98–112)
CLARITY UR REFRACT.AUTO: CLEAR
CO2 SERPL-SCNC: 23 MMOL/L (ref 21–32)
COLOR UR AUTO: YELLOW
CREAT BLD-MCNC: 0.83 MG/DL
EOSINOPHIL # BLD AUTO: 0.13 X10(3) UL (ref 0–0.7)
EOSINOPHIL NFR BLD AUTO: 1.5 %
ERYTHROCYTE [DISTWIDTH] IN BLOOD BY AUTOMATED COUNT: 13.3 %
GFR SERPLBLD BASED ON 1.73 SQ M-ARVRAT: 75 ML/MIN/1.73M2 (ref 60–?)
GLOBULIN PLAS-MCNC: 4.4 G/DL (ref 2.8–4.4)
GLUCOSE BLD-MCNC: 129 MG/DL (ref 70–99)
GLUCOSE UR STRIP.AUTO-MCNC: 150 MG/DL
HCT VFR BLD AUTO: 40.6 %
HGB BLD-MCNC: 13.7 G/DL
IMM GRANULOCYTES # BLD AUTO: 0.03 X10(3) UL (ref 0–1)
IMM GRANULOCYTES NFR BLD: 0.4 %
KETONES UR STRIP.AUTO-MCNC: NEGATIVE MG/DL
LYMPHOCYTES # BLD AUTO: 2.74 X10(3) UL (ref 1–4)
LYMPHOCYTES NFR BLD AUTO: 32 %
MCH RBC QN AUTO: 28.8 PG (ref 26–34)
MCHC RBC AUTO-ENTMCNC: 33.7 G/DL (ref 31–37)
MCV RBC AUTO: 85.3 FL
MONOCYTES # BLD AUTO: 0.38 X10(3) UL (ref 0.1–1)
MONOCYTES NFR BLD AUTO: 4.4 %
NEUTROPHILS # BLD AUTO: 5.21 X10 (3) UL (ref 1.5–7.7)
NEUTROPHILS # BLD AUTO: 5.21 X10(3) UL (ref 1.5–7.7)
NEUTROPHILS NFR BLD AUTO: 61 %
NITRITE UR QL STRIP.AUTO: NEGATIVE
OSMOLALITY SERPL CALC.SUM OF ELEC: 281 MOSM/KG (ref 275–295)
PH UR STRIP.AUTO: 7 [PH] (ref 5–8)
PLATELET # BLD AUTO: 168 10(3)UL (ref 150–450)
POTASSIUM SERPL-SCNC: 3.6 MMOL/L (ref 3.5–5.1)
PROT SERPL-MCNC: 8.2 G/DL (ref 6.4–8.2)
PROT UR STRIP.AUTO-MCNC: 100 MG/DL
RBC # BLD AUTO: 4.76 X10(6)UL
RBC UR QL AUTO: NEGATIVE
SODIUM SERPL-SCNC: 134 MMOL/L (ref 136–145)
SP GR UR STRIP.AUTO: 1.02 (ref 1–1.03)
UROBILINOGEN UR STRIP.AUTO-MCNC: <2 MG/DL
WBC # BLD AUTO: 8.6 X10(3) UL (ref 4–11)

## 2022-10-31 PROCEDURE — 80053 COMPREHEN METABOLIC PANEL: CPT | Performed by: EMERGENCY MEDICINE

## 2022-10-31 PROCEDURE — 99284 EMERGENCY DEPT VISIT MOD MDM: CPT | Performed by: EMERGENCY MEDICINE

## 2022-10-31 PROCEDURE — 87086 URINE CULTURE/COLONY COUNT: CPT | Performed by: EMERGENCY MEDICINE

## 2022-10-31 PROCEDURE — 81001 URINALYSIS AUTO W/SCOPE: CPT | Performed by: EMERGENCY MEDICINE

## 2022-10-31 PROCEDURE — 74176 CT ABD & PELVIS W/O CONTRAST: CPT | Performed by: EMERGENCY MEDICINE

## 2022-10-31 PROCEDURE — 85025 COMPLETE CBC W/AUTO DIFF WBC: CPT | Performed by: EMERGENCY MEDICINE

## 2022-10-31 PROCEDURE — 96374 THER/PROPH/DIAG INJ IV PUSH: CPT | Performed by: EMERGENCY MEDICINE

## 2022-10-31 RX ORDER — KETOROLAC TROMETHAMINE 15 MG/ML
15 INJECTION, SOLUTION INTRAMUSCULAR; INTRAVENOUS ONCE
Status: COMPLETED | OUTPATIENT
Start: 2022-10-31 | End: 2022-10-31

## 2022-10-31 RX ORDER — SULFAMETHOXAZOLE AND TRIMETHOPRIM 800; 160 MG/1; MG/1
1 TABLET ORAL ONCE
Status: COMPLETED | OUTPATIENT
Start: 2022-10-31 | End: 2022-10-31

## 2022-10-31 RX ORDER — SULFAMETHOXAZOLE AND TRIMETHOPRIM 800; 160 MG/1; MG/1
1 TABLET ORAL 2 TIMES DAILY
Qty: 14 TABLET | Refills: 0 | Status: SHIPPED | OUTPATIENT
Start: 2022-10-31 | End: 2022-11-07

## 2022-10-31 NOTE — ED QUICK NOTES
Patient states that she was diagnosed with depression a long time ago and has followed up with resources provided by PMD, has tried medications in the past. Denies any SI or HI. She states that she doesn't see comfortable with her living situation because she is currently  from her spouse, living in the same household. States that she chooses to stay at the home to care for granddaughter.

## 2022-10-31 NOTE — ED QUICK NOTES
Per language line  \"I don't feel safe at home because I don't have a great relationship with my family and can't do what I want. \" Many questions asked to clarify. Denies physical and mental abuse. states \"I just don't have the freedom to do what I want because they don't like the way I do things\". Answers yes to having depression because of this. Informed that resources and consult would be provided to her. Charge RN aware.

## 2023-01-10 DIAGNOSIS — E11.65 UNCONTROLLED TYPE 2 DIABETES MELLITUS WITH HYPERGLYCEMIA (HCC): ICD-10-CM

## 2023-01-11 DIAGNOSIS — E11.69 HYPERLIPIDEMIA ASSOCIATED WITH TYPE 2 DIABETES MELLITUS (HCC): Primary | ICD-10-CM

## 2023-01-11 DIAGNOSIS — E78.5 HYPERLIPIDEMIA ASSOCIATED WITH TYPE 2 DIABETES MELLITUS (HCC): Primary | ICD-10-CM

## 2023-01-11 RX ORDER — INSULIN GLARGINE 300 U/ML
INJECTION, SOLUTION SUBCUTANEOUS
Qty: 12 EACH | Refills: 1 | OUTPATIENT
Start: 2023-01-11

## 2023-01-11 NOTE — TELEPHONE ENCOUNTER
Very overdue for office visit. Also due for some labs.  If she schedules an office visit with me, can provide a 30 day supply of meds

## 2023-01-15 RX ORDER — INSULIN GLARGINE 300 U/ML
INJECTION, SOLUTION SUBCUTANEOUS
Qty: 12 ML | Refills: 0 | Status: SHIPPED | OUTPATIENT
Start: 2023-01-15

## 2023-02-27 ENCOUNTER — LAB ENCOUNTER (OUTPATIENT)
Dept: LAB | Age: 73
End: 2023-02-27
Attending: INTERNAL MEDICINE
Payer: MEDICARE

## 2023-02-27 DIAGNOSIS — E78.5 HYPERLIPIDEMIA, UNSPECIFIED HYPERLIPIDEMIA TYPE: ICD-10-CM

## 2023-02-27 DIAGNOSIS — Z79.4 TYPE 2 DIABETES MELLITUS TREATED WITH INSULIN (HCC): ICD-10-CM

## 2023-02-27 DIAGNOSIS — E78.5 HYPERLIPIDEMIA ASSOCIATED WITH TYPE 2 DIABETES MELLITUS (HCC): ICD-10-CM

## 2023-02-27 DIAGNOSIS — R79.89 ELEVATED LFTS: ICD-10-CM

## 2023-02-27 DIAGNOSIS — E11.69 HYPERLIPIDEMIA ASSOCIATED WITH TYPE 2 DIABETES MELLITUS (HCC): ICD-10-CM

## 2023-02-27 DIAGNOSIS — E11.9 TYPE 2 DIABETES MELLITUS TREATED WITH INSULIN (HCC): ICD-10-CM

## 2023-02-27 DIAGNOSIS — D69.6 THROMBOCYTOPENIA (HCC): ICD-10-CM

## 2023-02-27 LAB
ALBUMIN SERPL-MCNC: 3.7 G/DL (ref 3.4–5)
ALBUMIN/GLOB SERPL: 0.9 {RATIO} (ref 1–2)
ALP LIVER SERPL-CCNC: 78 U/L
ALT SERPL-CCNC: 28 U/L
ANION GAP SERPL CALC-SCNC: 12 MMOL/L (ref 0–18)
AST SERPL-CCNC: 28 U/L (ref 15–37)
BILIRUB SERPL-MCNC: 0.5 MG/DL (ref 0.1–2)
BUN BLD-MCNC: 19 MG/DL (ref 7–18)
CALCIUM BLD-MCNC: 9.7 MG/DL (ref 8.5–10.1)
CHLORIDE SERPL-SCNC: 104 MMOL/L (ref 98–112)
CHOLEST SERPL-MCNC: 278 MG/DL (ref ?–200)
CO2 SERPL-SCNC: 22 MMOL/L (ref 21–32)
CREAT BLD-MCNC: 0.93 MG/DL
EST. AVERAGE GLUCOSE BLD GHB EST-MCNC: 226 MG/DL (ref 68–126)
FASTING PATIENT LIPID ANSWER: YES
FASTING STATUS PATIENT QL REPORTED: YES
GFR SERPLBLD BASED ON 1.73 SQ M-ARVRAT: 66 ML/MIN/1.73M2 (ref 60–?)
GLOBULIN PLAS-MCNC: 4.3 G/DL (ref 2.8–4.4)
GLUCOSE BLD-MCNC: 130 MG/DL (ref 70–99)
HBA1C MFR BLD: 9.5 % (ref ?–5.7)
HDLC SERPL-MCNC: 74 MG/DL (ref 40–59)
LDLC SERPL CALC-MCNC: 149 MG/DL (ref ?–100)
NONHDLC SERPL-MCNC: 204 MG/DL (ref ?–130)
OSMOLALITY SERPL CALC.SUM OF ELEC: 290 MOSM/KG (ref 275–295)
PLATELET # BLD AUTO: 123.2 10(3)UL (ref 150–450)
POTASSIUM SERPL-SCNC: 3.6 MMOL/L (ref 3.5–5.1)
PROT SERPL-MCNC: 8 G/DL (ref 6.4–8.2)
SODIUM SERPL-SCNC: 138 MMOL/L (ref 136–145)
TRIGL SERPL-MCNC: 301 MG/DL (ref 30–149)
VLDLC SERPL CALC-MCNC: 58 MG/DL (ref 0–30)

## 2023-02-27 PROCEDURE — 83036 HEMOGLOBIN GLYCOSYLATED A1C: CPT

## 2023-02-27 PROCEDURE — 80053 COMPREHEN METABOLIC PANEL: CPT

## 2023-02-27 PROCEDURE — 36415 COLL VENOUS BLD VENIPUNCTURE: CPT

## 2023-02-27 PROCEDURE — 80061 LIPID PANEL: CPT

## 2023-02-28 ENCOUNTER — LAB ENCOUNTER (OUTPATIENT)
Dept: LAB | Age: 73
End: 2023-02-28
Attending: INTERNAL MEDICINE
Payer: MEDICARE

## 2023-02-28 DIAGNOSIS — E11.9 TYPE 2 DIABETES MELLITUS TREATED WITH INSULIN (HCC): ICD-10-CM

## 2023-02-28 DIAGNOSIS — E11.69 HYPERLIPIDEMIA ASSOCIATED WITH TYPE 2 DIABETES MELLITUS (HCC): ICD-10-CM

## 2023-02-28 DIAGNOSIS — Z79.4 TYPE 2 DIABETES MELLITUS TREATED WITH INSULIN (HCC): ICD-10-CM

## 2023-02-28 DIAGNOSIS — R79.89 ELEVATED LFTS: ICD-10-CM

## 2023-02-28 DIAGNOSIS — E78.5 HYPERLIPIDEMIA, UNSPECIFIED HYPERLIPIDEMIA TYPE: ICD-10-CM

## 2023-02-28 DIAGNOSIS — E78.5 HYPERLIPIDEMIA ASSOCIATED WITH TYPE 2 DIABETES MELLITUS (HCC): ICD-10-CM

## 2023-02-28 DIAGNOSIS — D69.6 THROMBOCYTOPENIA (HCC): ICD-10-CM

## 2023-02-28 LAB
CREAT UR-SCNC: 60.6 MG/DL
MICROALBUMIN UR-MCNC: 46.4 MG/DL
MICROALBUMIN/CREAT 24H UR-RTO: 765.7 UG/MG (ref ?–30)

## 2023-02-28 PROCEDURE — 82570 ASSAY OF URINE CREATININE: CPT

## 2023-02-28 PROCEDURE — 82043 UR ALBUMIN QUANTITATIVE: CPT

## 2023-02-28 PROCEDURE — 3060F POS MICROALBUMINURIA REV: CPT | Performed by: INTERNAL MEDICINE

## 2023-03-13 ENCOUNTER — TELEPHONE (OUTPATIENT)
Dept: INTERNAL MEDICINE CLINIC | Facility: CLINIC | Age: 73
End: 2023-03-13

## 2023-03-13 RX ORDER — INSULIN GLARGINE 300 U/ML
INJECTION, SOLUTION SUBCUTANEOUS
Qty: 12 ML | Refills: 0 | Status: SHIPPED | OUTPATIENT
Start: 2023-03-13

## 2023-03-13 NOTE — TELEPHONE ENCOUNTER
Refill needed     Insulin Glargine, 1 Unit Dial, (TOUJEO SOLOSTAR) 300 UNIT/ML Subcutaneous Solution Pen-injector    [facility], [phone]

## 2023-05-01 ENCOUNTER — OFFICE VISIT (OUTPATIENT)
Dept: INTERNAL MEDICINE CLINIC | Facility: CLINIC | Age: 73
End: 2023-05-01
Payer: MEDICARE

## 2023-05-01 VITALS
DIASTOLIC BLOOD PRESSURE: 70 MMHG | HEIGHT: 58.5 IN | SYSTOLIC BLOOD PRESSURE: 180 MMHG | OXYGEN SATURATION: 97 % | HEART RATE: 75 BPM | TEMPERATURE: 98 F | WEIGHT: 152.38 LBS | BODY MASS INDEX: 31.13 KG/M2 | RESPIRATION RATE: 16 BRPM

## 2023-05-01 DIAGNOSIS — E11.59 HYPERTENSION ASSOCIATED WITH DIABETES (HCC): ICD-10-CM

## 2023-05-01 DIAGNOSIS — E11.69 DIABETES MELLITUS TYPE 2 IN OBESE (HCC): ICD-10-CM

## 2023-05-01 DIAGNOSIS — E66.9 DIABETES MELLITUS TYPE 2 IN OBESE (HCC): ICD-10-CM

## 2023-05-01 DIAGNOSIS — K74.60 HEPATIC CIRRHOSIS, UNSPECIFIED HEPATIC CIRRHOSIS TYPE, UNSPECIFIED WHETHER ASCITES PRESENT (HCC): ICD-10-CM

## 2023-05-01 DIAGNOSIS — I15.2 HYPERTENSION ASSOCIATED WITH DIABETES (HCC): ICD-10-CM

## 2023-05-01 DIAGNOSIS — Z00.00 ROUTINE GENERAL MEDICAL EXAMINATION AT A HEALTH CARE FACILITY: Primary | ICD-10-CM

## 2023-05-01 DIAGNOSIS — Z79.4 INSULIN DEPENDENT TYPE 2 DIABETES MELLITUS (HCC): ICD-10-CM

## 2023-05-01 DIAGNOSIS — E11.69 HYPERLIPIDEMIA ASSOCIATED WITH TYPE 2 DIABETES MELLITUS (HCC): ICD-10-CM

## 2023-05-01 DIAGNOSIS — E78.5 HYPERLIPIDEMIA ASSOCIATED WITH TYPE 2 DIABETES MELLITUS (HCC): ICD-10-CM

## 2023-05-01 DIAGNOSIS — E11.3513 PROLIFERATIVE DIABETIC RETINOPATHY OF BOTH EYES WITH MACULAR EDEMA ASSOCIATED WITH TYPE 2 DIABETES MELLITUS (HCC): ICD-10-CM

## 2023-05-01 DIAGNOSIS — E11.29 DIABETES MELLITUS WITH ALBUMINURIA (HCC): ICD-10-CM

## 2023-05-01 DIAGNOSIS — F33.1 MDD (MAJOR DEPRESSIVE DISORDER), RECURRENT EPISODE, MODERATE (HCC): ICD-10-CM

## 2023-05-01 DIAGNOSIS — E11.9 INSULIN DEPENDENT TYPE 2 DIABETES MELLITUS (HCC): ICD-10-CM

## 2023-05-01 DIAGNOSIS — R80.9 DIABETES MELLITUS WITH ALBUMINURIA (HCC): ICD-10-CM

## 2023-05-01 DIAGNOSIS — E11.65 UNCONTROLLED TYPE 2 DIABETES MELLITUS WITH HYPERGLYCEMIA (HCC): ICD-10-CM

## 2023-05-01 PROBLEM — E66.3 OVERWEIGHT (BMI 25.0-29.9): Status: RESOLVED | Noted: 2021-04-23 | Resolved: 2023-05-01

## 2023-05-01 RX ORDER — LOSARTAN POTASSIUM 100 MG/1
100 TABLET ORAL EVERY EVENING
Qty: 90 TABLET | Refills: 3 | Status: SHIPPED | OUTPATIENT
Start: 2023-05-01

## 2023-05-11 RX ORDER — INSULIN GLARGINE 300 U/ML
INJECTION, SOLUTION SUBCUTANEOUS
Qty: 12 ML | Refills: 0 | Status: SHIPPED | OUTPATIENT
Start: 2023-05-11

## 2023-05-11 NOTE — TELEPHONE ENCOUNTER
Pt requesting refill:    Insulin Glargine, 1 Unit Dial, (TOUJEO SOLOSTAR) 300 UNIT/ML Subcutaneous Solution Pen-injector      [address] [facility], [phone]    LOV 5/1/23

## 2023-05-15 ENCOUNTER — HOSPITAL ENCOUNTER (OUTPATIENT)
Dept: MAMMOGRAPHY | Age: 73
Discharge: HOME | End: 2023-05-15
Attending: INTERNAL MEDICINE
Payer: MEDICARE

## 2023-05-15 DIAGNOSIS — Z12.31 ENCOUNTER FOR SCREENING MAMMOGRAM FOR MALIGNANT NEOPLASM OF BREAST: ICD-10-CM

## 2023-05-15 PROCEDURE — 77063 BREAST TOMOSYNTHESIS BI: CPT | Performed by: INTERNAL MEDICINE

## 2023-05-15 PROCEDURE — 77067 SCR MAMMO BI INCL CAD: CPT | Performed by: INTERNAL MEDICINE

## 2023-05-25 ENCOUNTER — TELEPHONE (OUTPATIENT)
Dept: INTERNAL MEDICINE CLINIC | Facility: CLINIC | Age: 73
End: 2023-05-25

## 2023-05-25 NOTE — TELEPHONE ENCOUNTER
Patient called to follow up on mammogram results. Patient has not seen message on [employer]. Informed patient per Dr. Sameul Dakin message \"Your mammogram did not have suspicious findings and was read as benign. Recommend repeating exam and mammogram in one year; sooner if new findings/concerns. \" Patient verbalized understanding, denies any symptoms, and has no further questions.

## 2023-06-05 ENCOUNTER — HOSPITAL ENCOUNTER (OUTPATIENT)
Dept: BONE DENSITY | Age: 73
Discharge: HOME | End: 2023-06-05
Attending: INTERNAL MEDICINE
Payer: MEDICARE

## 2023-06-05 DIAGNOSIS — Z78.0 POSTMENOPAUSAL: ICD-10-CM

## 2023-06-05 PROCEDURE — 77080 DXA BONE DENSITY AXIAL: CPT | Performed by: INTERNAL MEDICINE

## 2023-07-24 NOTE — TELEPHONE ENCOUNTER
Name from pharmacy: [place] 300 UNIT/ML Subcutaneous Solution Pen-injector          Will file in chart as: TOUJEO SOLOSTAR 300 UNIT/ML Subcutaneous Solution Pen-injector    Sig: INJECT 40 UNITS SUBCUTANEOUSLY IN THE MORNING AND 42 IN THE EVENING    Disp: 12 mL    Refills: 0    Start: 7/24/2023    Class: Normal    Non-formulary    Last ordered: 2 months ago by Martha Bernard MD Last refill: 5/11/2023    Rx #: [id number]       To be filled at: [facility] DR ANAT CONNER [facility], [address] [place] [address] [phone], [phone]       LOV:  5/1/23  RTC:  3 months  Recent Labs:  2/27/23    Upcoming OV  8/19/23

## 2023-07-25 RX ORDER — INSULIN GLARGINE 300 U/ML
INJECTION, SOLUTION SUBCUTANEOUS
Qty: 12 ML | Refills: 0 | Status: SHIPPED | OUTPATIENT
Start: 2023-07-25

## 2023-08-17 ENCOUNTER — LAB ENCOUNTER (OUTPATIENT)
Dept: LAB | Age: 73
End: 2023-08-17
Attending: INTERNAL MEDICINE
Payer: MEDICARE

## 2023-08-17 DIAGNOSIS — I15.2 HYPERTENSION ASSOCIATED WITH DIABETES: ICD-10-CM

## 2023-08-17 DIAGNOSIS — K74.60 HEPATIC CIRRHOSIS, UNSPECIFIED HEPATIC CIRRHOSIS TYPE, UNSPECIFIED WHETHER ASCITES PRESENT (HCC): ICD-10-CM

## 2023-08-17 DIAGNOSIS — R80.9 DIABETES MELLITUS WITH ALBUMINURIA: ICD-10-CM

## 2023-08-17 DIAGNOSIS — Z00.00 ROUTINE GENERAL MEDICAL EXAMINATION AT A HEALTH CARE FACILITY: ICD-10-CM

## 2023-08-17 DIAGNOSIS — E11.65 UNCONTROLLED TYPE 2 DIABETES MELLITUS WITH HYPERGLYCEMIA (HCC): ICD-10-CM

## 2023-08-17 DIAGNOSIS — E11.29 DIABETES MELLITUS WITH ALBUMINURIA: ICD-10-CM

## 2023-08-17 DIAGNOSIS — E11.3513 PROLIFERATIVE DIABETIC RETINOPATHY OF BOTH EYES WITH MACULAR EDEMA ASSOCIATED WITH TYPE 2 DIABETES MELLITUS (HCC): ICD-10-CM

## 2023-08-17 DIAGNOSIS — E78.5 HYPERLIPIDEMIA ASSOCIATED WITH TYPE 2 DIABETES MELLITUS: ICD-10-CM

## 2023-08-17 DIAGNOSIS — E11.9 INSULIN DEPENDENT TYPE 2 DIABETES MELLITUS (HCC): ICD-10-CM

## 2023-08-17 DIAGNOSIS — Z79.4 INSULIN DEPENDENT TYPE 2 DIABETES MELLITUS (HCC): ICD-10-CM

## 2023-08-17 DIAGNOSIS — E11.69 HYPERLIPIDEMIA ASSOCIATED WITH TYPE 2 DIABETES MELLITUS: ICD-10-CM

## 2023-08-17 DIAGNOSIS — E11.59 HYPERTENSION ASSOCIATED WITH DIABETES: ICD-10-CM

## 2023-08-17 LAB
ALT SERPL-CCNC: 27 U/L
ANION GAP SERPL CALC-SCNC: 7 MMOL/L (ref 0–18)
AST SERPL-CCNC: 19 U/L (ref 15–37)
BASOPHILS # BLD AUTO: 0.07 X10(3) UL (ref 0–0.2)
BASOPHILS NFR BLD AUTO: 1 %
BUN BLD-MCNC: 19 MG/DL (ref 7–18)
CALCIUM BLD-MCNC: 9.4 MG/DL (ref 8.5–10.1)
CHLORIDE SERPL-SCNC: 107 MMOL/L (ref 98–112)
CHOLEST SERPL-MCNC: 261 MG/DL (ref ?–200)
CO2 SERPL-SCNC: 22 MMOL/L (ref 21–32)
CREAT BLD-MCNC: 0.9 MG/DL
DEPRECATED HBV CORE AB SER IA-ACNC: 50.2 NG/ML
EGFRCR SERPLBLD CKD-EPI 2021: 68 ML/MIN/1.73M2 (ref 60–?)
EOSINOPHIL # BLD AUTO: 0.14 X10(3) UL (ref 0–0.7)
EOSINOPHIL NFR BLD AUTO: 2.1 %
ERYTHROCYTE [DISTWIDTH] IN BLOOD BY AUTOMATED COUNT: 13.3 %
EST. AVERAGE GLUCOSE BLD GHB EST-MCNC: 243 MG/DL (ref 68–126)
FASTING PATIENT LIPID ANSWER: YES
FASTING STATUS PATIENT QL REPORTED: YES
GLUCOSE BLD-MCNC: 146 MG/DL (ref 70–99)
HBA1C MFR BLD: 10.1 % (ref ?–5.7)
HCT VFR BLD AUTO: 36.1 %
HDLC SERPL-MCNC: 73 MG/DL (ref 40–59)
HGB BLD-MCNC: 12.2 G/DL
IMM GRANULOCYTES # BLD AUTO: 0.03 X10(3) UL (ref 0–1)
IMM GRANULOCYTES NFR BLD: 0.4 %
LDLC SERPL CALC-MCNC: 155 MG/DL (ref ?–100)
LYMPHOCYTES # BLD AUTO: 2.06 X10(3) UL (ref 1–4)
LYMPHOCYTES NFR BLD AUTO: 30.5 %
MCH RBC QN AUTO: 28.7 PG (ref 26–34)
MCHC RBC AUTO-ENTMCNC: 33.8 G/DL (ref 31–37)
MCV RBC AUTO: 84.9 FL
MONOCYTES # BLD AUTO: 0.43 X10(3) UL (ref 0.1–1)
MONOCYTES NFR BLD AUTO: 6.4 %
NEUTROPHILS # BLD AUTO: 4.03 X10 (3) UL (ref 1.5–7.7)
NEUTROPHILS # BLD AUTO: 4.03 X10(3) UL (ref 1.5–7.7)
NEUTROPHILS NFR BLD AUTO: 59.6 %
NONHDLC SERPL-MCNC: 188 MG/DL (ref ?–130)
OSMOLALITY SERPL CALC.SUM OF ELEC: 287 MOSM/KG (ref 275–295)
PLATELET # BLD AUTO: 141 10(3)UL (ref 150–450)
POTASSIUM SERPL-SCNC: 3.9 MMOL/L (ref 3.5–5.1)
RBC # BLD AUTO: 4.25 X10(6)UL
SODIUM SERPL-SCNC: 136 MMOL/L (ref 136–145)
TRIGL SERPL-MCNC: 184 MG/DL (ref 30–149)
VIT B12 SERPL-MCNC: 736 PG/ML (ref 193–986)
VLDLC SERPL CALC-MCNC: 36 MG/DL (ref 0–30)
WBC # BLD AUTO: 6.8 X10(3) UL (ref 4–11)

## 2023-08-17 PROCEDURE — 84460 ALANINE AMINO (ALT) (SGPT): CPT

## 2023-08-17 PROCEDURE — 84450 TRANSFERASE (AST) (SGOT): CPT

## 2023-08-17 PROCEDURE — 36415 COLL VENOUS BLD VENIPUNCTURE: CPT

## 2023-08-17 PROCEDURE — 80061 LIPID PANEL: CPT

## 2023-08-17 PROCEDURE — 85025 COMPLETE CBC W/AUTO DIFF WBC: CPT

## 2023-08-17 PROCEDURE — 82607 VITAMIN B-12: CPT

## 2023-08-17 PROCEDURE — 80048 BASIC METABOLIC PNL TOTAL CA: CPT

## 2023-08-17 PROCEDURE — 3046F HEMOGLOBIN A1C LEVEL >9.0%: CPT | Performed by: INTERNAL MEDICINE

## 2023-08-17 PROCEDURE — 83036 HEMOGLOBIN GLYCOSYLATED A1C: CPT

## 2023-08-17 PROCEDURE — 82728 ASSAY OF FERRITIN: CPT

## 2023-08-25 ENCOUNTER — HOSPITAL ENCOUNTER (EMERGENCY)
Age: 73
Discharge: HOME | End: 2023-08-25
Attending: EMERGENCY MEDICINE
Payer: MEDICARE

## 2023-08-25 ENCOUNTER — NURSE TRIAGE (OUTPATIENT)
Dept: INTERNAL MEDICINE CLINIC | Facility: CLINIC | Age: 73
End: 2023-08-25

## 2023-08-25 VITALS
OXYGEN SATURATION: 97 % | BODY MASS INDEX: 31.28 KG/M2 | TEMPERATURE: 98 F | DIASTOLIC BLOOD PRESSURE: 73 MMHG | HEIGHT: 62 IN | SYSTOLIC BLOOD PRESSURE: 161 MMHG | WEIGHT: 170 LBS | RESPIRATION RATE: 18 BRPM | HEART RATE: 84 BPM

## 2023-08-25 DIAGNOSIS — S00.412A ABRASION OF LEFT EAR CANAL, INITIAL ENCOUNTER: Primary | ICD-10-CM

## 2023-08-25 PROCEDURE — 99283 EMERGENCY DEPT VISIT LOW MDM: CPT

## 2023-08-25 PROCEDURE — 99282 EMERGENCY DEPT VISIT SF MDM: CPT

## 2023-08-25 RX ORDER — NEOMYCIN SULFATE, POLYMYXIN B SULFATE AND HYDROCORTISONE 10; 3.5; 1 MG/ML; MG/ML; [USP'U]/ML
4 SUSPENSION/ DROPS AURICULAR (OTIC) 4 TIMES DAILY
Qty: 10 ML | Refills: 0 | Status: SHIPPED | OUTPATIENT
Start: 2023-08-25 | End: 2023-09-04

## 2023-08-25 NOTE — TELEPHONE ENCOUNTER
Pt called reporting that she has blood coming from her ears when she coughs along with ear pain and headache. No appointments available.  Please advise

## 2023-08-25 NOTE — TELEPHONE ENCOUNTER
Action Requested: Summary for Provider     []  Critical Lab, Recommendations Needed  [] Need Additional Advice  [x]   FYI    []   Need Orders  [] Need Medications Sent to Pharmacy  []  Other     SUMMARY: No appointments available with any provider at PCP office. Reason for call: Ear Drainage  Onset: Data Unavailable    Reason for Disposition   Patient sounds very sick or weak to the triager    Answer Assessment - Initial Assessment Questions  1. LOCATION: \"Which ear is involved? \"       bilateral  2. COLOR: \"What is the color of the discharge? \"       Red/brown, dried blood  3. CONSISTENCY: \"How runny is the discharge? Could it be water? \"       dry  4. ONSET: \"When did you first notice the discharge? \"      Yesterday morning 8/24/23  5. PAIN: \"Is there any earache? \" \"How bad is it? \"  (Scale 1-10; or mild, moderate, severe)      moderate  6. OBJECTS: \"Have you put anything in your ear? \" (e.g., Q-tip, other object)       \"Ear protectors\"  7. OTHER SYMPTOMS: \"Do you have any other symptoms? \" (e.g., headache, fever, dizziness, vomiting, runny nose)      Headache, pain/pressure to face, dizziness   8. PREGNANCY: \"Is there any chance you are pregnant? \" \"When was your last menstrual period? \"      N/A pt is 67 y/o female     Spoke to pt, she does need an . Conference called with Gabriel Frost Dr  Max/ID #[id number]. Pt stated that she wears ear protectors every night and she woke yesterday morning with a small amount of dark, dried blood on her pillow case. Pt has pain/pressure at the front of her face. Overnight last night the pt had some dried blood again.     Protocols used: Ear - [id number]

## 2023-08-28 ENCOUNTER — PATIENT OUTREACH (OUTPATIENT)
Dept: CASE MANAGEMENT | Age: 73
End: 2023-08-28

## 2023-09-07 NOTE — TELEPHONE ENCOUNTER
Toujeo solostar  Filled 7-25-23  Qty 12 mL  0 refills  Future Appointments   Date Time Provider Kwan Carmichael   10/17/2023 11:00 AM Lulu Lee MD [facility] [place]   LOV 5-1-23 [place]  RTC 3 mo.   Labs 8-17-23 A1c

## 2023-09-07 NOTE — TELEPHONE ENCOUNTER
Refill request.       Insulin Glargine, 1 Unit Dial, (TOUJEO SOLOSTAR) 300 UNIT/ML Subcutaneous Solution Pen-injector     [address] [facility] [address] [facility] [phone], [facility] [address] [place] [address] Phone: [phone] Fax: [phone]

## 2023-09-08 RX ORDER — INSULIN GLARGINE 300 U/ML
INJECTION, SOLUTION SUBCUTANEOUS
Qty: 12 ML | Refills: 0 | Status: SHIPPED | OUTPATIENT
Start: 2023-09-08

## 2023-09-15 ENCOUNTER — TELEPHONE (OUTPATIENT)
Dept: INTERNAL MEDICINE CLINIC | Facility: CLINIC | Age: 73
End: 2023-09-15

## 2023-10-08 ENCOUNTER — HOSPITAL ENCOUNTER (EMERGENCY)
Age: 73
Discharge: HOME | End: 2023-10-08
Attending: EMERGENCY MEDICINE

## 2023-10-08 ENCOUNTER — APPOINTMENT (OUTPATIENT)
Dept: GENERAL RADIOLOGY | Age: 73
End: 2023-10-08
Attending: PHYSICIAN ASSISTANT

## 2023-10-08 VITALS
SYSTOLIC BLOOD PRESSURE: 155 MMHG | TEMPERATURE: 98 F | WEIGHT: 147 LBS | RESPIRATION RATE: 16 BRPM | BODY MASS INDEX: 27 KG/M2 | HEART RATE: 78 BPM | DIASTOLIC BLOOD PRESSURE: 68 MMHG | OXYGEN SATURATION: 99 %

## 2023-10-08 DIAGNOSIS — R07.9 CHEST PAIN OF UNCERTAIN ETIOLOGY: ICD-10-CM

## 2023-10-08 DIAGNOSIS — H92.03 OTALGIA OF BOTH EARS: Primary | ICD-10-CM

## 2023-10-08 LAB
ALBUMIN SERPL-MCNC: 3.4 G/DL (ref 3.4–5)
ALBUMIN/GLOB SERPL: 0.8 {RATIO} (ref 1–2)
ALP LIVER SERPL-CCNC: 90 U/L
ALT SERPL-CCNC: 30 U/L
ANION GAP SERPL CALC-SCNC: 11 MMOL/L (ref 0–18)
AST SERPL-CCNC: 26 U/L (ref 15–37)
BASOPHILS # BLD AUTO: 0.06 X10(3) UL (ref 0–0.2)
BASOPHILS NFR BLD AUTO: 0.8 %
BILIRUB SERPL-MCNC: 0.3 MG/DL (ref 0.1–2)
BUN BLD-MCNC: 24 MG/DL (ref 7–18)
CALCIUM BLD-MCNC: 8.9 MG/DL (ref 8.5–10.1)
CHLORIDE SERPL-SCNC: 106 MMOL/L (ref 98–112)
CO2 SERPL-SCNC: 21 MMOL/L (ref 21–32)
CREAT BLD-MCNC: 1 MG/DL
EGFRCR SERPLBLD CKD-EPI 2021: 60 ML/MIN/1.73M2 (ref 60–?)
EOSINOPHIL # BLD AUTO: 0.17 X10(3) UL (ref 0–0.7)
EOSINOPHIL NFR BLD AUTO: 2.3 %
ERYTHROCYTE [DISTWIDTH] IN BLOOD BY AUTOMATED COUNT: 13.4 %
GLOBULIN PLAS-MCNC: 4.2 G/DL (ref 2.8–4.4)
GLUCOSE BLD-MCNC: 145 MG/DL (ref 70–99)
HCT VFR BLD AUTO: 35.8 %
HGB BLD-MCNC: 12 G/DL
IMM GRANULOCYTES # BLD AUTO: 0.01 X10(3) UL (ref 0–1)
IMM GRANULOCYTES NFR BLD: 0.1 %
LYMPHOCYTES # BLD AUTO: 1.76 X10(3) UL (ref 1–4)
LYMPHOCYTES NFR BLD AUTO: 23.7 %
MCH RBC QN AUTO: 29.3 PG (ref 26–34)
MCHC RBC AUTO-ENTMCNC: 33.5 G/DL (ref 31–37)
MCV RBC AUTO: 87.5 FL
MONOCYTES # BLD AUTO: 0.46 X10(3) UL (ref 0.1–1)
MONOCYTES NFR BLD AUTO: 6.2 %
NEUTROPHILS # BLD AUTO: 4.96 X10 (3) UL (ref 1.5–7.7)
NEUTROPHILS # BLD AUTO: 4.96 X10(3) UL (ref 1.5–7.7)
NEUTROPHILS NFR BLD AUTO: 66.9 %
OSMOLALITY SERPL CALC.SUM OF ELEC: 293 MOSM/KG (ref 275–295)
PLATELET # BLD AUTO: 141 10(3)UL (ref 150–450)
POTASSIUM SERPL-SCNC: 3.8 MMOL/L (ref 3.5–5.1)
PROT SERPL-MCNC: 7.6 G/DL (ref 6.4–8.2)
RBC # BLD AUTO: 4.09 X10(6)UL
SODIUM SERPL-SCNC: 138 MMOL/L (ref 136–145)
TROPONIN I HIGH SENSITIVITY: 6 NG/L
WBC # BLD AUTO: 7.4 X10(3) UL (ref 4–11)

## 2023-10-08 PROCEDURE — 93005 ELECTROCARDIOGRAM TRACING: CPT

## 2023-10-08 PROCEDURE — 85025 COMPLETE CBC W/AUTO DIFF WBC: CPT | Performed by: PHYSICIAN ASSISTANT

## 2023-10-08 PROCEDURE — 84484 ASSAY OF TROPONIN QUANT: CPT | Performed by: PHYSICIAN ASSISTANT

## 2023-10-08 PROCEDURE — 99284 EMERGENCY DEPT VISIT MOD MDM: CPT

## 2023-10-08 PROCEDURE — 36415 COLL VENOUS BLD VENIPUNCTURE: CPT

## 2023-10-08 PROCEDURE — 80053 COMPREHEN METABOLIC PANEL: CPT | Performed by: PHYSICIAN ASSISTANT

## 2023-10-08 RX ORDER — NEOMYCIN SULFATE, POLYMYXIN B SULFATE AND HYDROCORTISONE 10; 3.5; 1 MG/ML; MG/ML; [USP'U]/ML
3 SUSPENSION/ DROPS AURICULAR (OTIC) 4 TIMES DAILY
Qty: 1 EACH | Refills: 0 | Status: SHIPPED | OUTPATIENT
Start: 2023-10-08 | End: 2023-10-15

## 2023-10-08 RX ORDER — HYDRALAZINE HYDROCHLORIDE 20 MG/ML
5 INJECTION INTRAMUSCULAR; INTRAVENOUS ONCE
Status: DISCONTINUED | OUTPATIENT
Start: 2023-10-08 | End: 2023-10-08

## 2023-10-08 NOTE — ED PROVIDER NOTES
I reviewed that chart and discussed the case. I have examined the patient and noted left ear pain with drainage for the past 1 week. When pain flares which is usually at night she has some associated chest pressure. Last episode was yesterday night. Patient seen in the ER about 1 month ago for similar ear pain and prescribed Cortisporin with improvement. She was unable to try this medication again last week because she had run out. Will check EKG, troponin and chest x-ray. Likely will represcribe Cortisporin    EKG, labs including troponin are unremarkable. Patient declines chest x-ray. I agree with the following clinical impression(s): Otitis externa, atypical chest pain    I agree with the plan as noted.

## 2023-10-09 LAB
ATRIAL RATE: 74 BPM
P AXIS: 23 DEGREES
P-R INTERVAL: 144 MS
Q-T INTERVAL: 404 MS
QRS DURATION: 84 MS
QTC CALCULATION (BEZET): 448 MS
R AXIS: 10 DEGREES
T AXIS: 78 DEGREES
VENTRICULAR RATE: 74 BPM

## 2023-10-13 RX ORDER — INSULIN GLARGINE 300 U/ML
INJECTION, SOLUTION SUBCUTANEOUS
Qty: 12 ML | Refills: 0 | Status: SHIPPED | OUTPATIENT
Start: 2023-10-13

## 2023-10-13 NOTE — TELEPHONE ENCOUNTER
Toujeo solostar   Filled 9-8-23  Qty 12 mL  0 refills  Future Appointments   Date Time Provider Kwan Carmichael   10/17/2023 11:00 AM Maricruz Smith MD [facility] [place]   LOV 5-1-23 [place]  RTC 3mo.    Labs 8-17-23 A1c

## 2023-10-17 ENCOUNTER — OFFICE VISIT (OUTPATIENT)
Dept: INTERNAL MEDICINE CLINIC | Facility: CLINIC | Age: 73
End: 2023-10-17
Payer: MEDICARE

## 2023-10-17 VITALS
RESPIRATION RATE: 12 BRPM | SYSTOLIC BLOOD PRESSURE: 162 MMHG | HEART RATE: 78 BPM | OXYGEN SATURATION: 99 % | WEIGHT: 151.38 LBS | TEMPERATURE: 98 F | HEIGHT: 58.5 IN | BODY MASS INDEX: 30.93 KG/M2 | DIASTOLIC BLOOD PRESSURE: 60 MMHG

## 2023-10-17 DIAGNOSIS — E11.69 DIABETES MELLITUS TYPE 2 IN OBESE: ICD-10-CM

## 2023-10-17 DIAGNOSIS — E66.9 DIABETES MELLITUS TYPE 2 IN OBESE: ICD-10-CM

## 2023-10-17 DIAGNOSIS — E11.69 HYPERLIPIDEMIA ASSOCIATED WITH TYPE 2 DIABETES MELLITUS: ICD-10-CM

## 2023-10-17 DIAGNOSIS — R80.9 DIABETES MELLITUS WITH ALBUMINURIA: Primary | ICD-10-CM

## 2023-10-17 DIAGNOSIS — E11.9 INSULIN DEPENDENT TYPE 2 DIABETES MELLITUS (HCC): ICD-10-CM

## 2023-10-17 DIAGNOSIS — I15.2 HYPERTENSION ASSOCIATED WITH DIABETES: ICD-10-CM

## 2023-10-17 DIAGNOSIS — E11.59 HYPERTENSION ASSOCIATED WITH DIABETES: ICD-10-CM

## 2023-10-17 DIAGNOSIS — E78.5 HYPERLIPIDEMIA ASSOCIATED WITH TYPE 2 DIABETES MELLITUS: ICD-10-CM

## 2023-10-17 DIAGNOSIS — E11.29 DIABETES MELLITUS WITH ALBUMINURIA: Primary | ICD-10-CM

## 2023-10-17 DIAGNOSIS — E11.65 UNCONTROLLED TYPE 2 DIABETES MELLITUS WITH HYPERGLYCEMIA (HCC): ICD-10-CM

## 2023-10-17 DIAGNOSIS — Z79.4 INSULIN DEPENDENT TYPE 2 DIABETES MELLITUS (HCC): ICD-10-CM

## 2023-10-17 PROBLEM — D69.6 THROMBOCYTOPENIA (HCC): Chronic | Status: ACTIVE | Noted: 2023-10-17

## 2023-10-17 PROBLEM — D69.6 THROMBOCYTOPENIA: Chronic | Status: ACTIVE | Noted: 2023-10-17

## 2023-10-17 PROCEDURE — 3008F BODY MASS INDEX DOCD: CPT | Performed by: INTERNAL MEDICINE

## 2023-10-17 PROCEDURE — 1160F RVW MEDS BY RX/DR IN RCRD: CPT | Performed by: INTERNAL MEDICINE

## 2023-10-17 PROCEDURE — 99215 OFFICE O/P EST HI 40 MIN: CPT | Performed by: INTERNAL MEDICINE

## 2023-10-17 PROCEDURE — 3078F DIAST BP <80 MM HG: CPT | Performed by: INTERNAL MEDICINE

## 2023-10-17 PROCEDURE — 1159F MED LIST DOCD IN RCRD: CPT | Performed by: INTERNAL MEDICINE

## 2023-10-17 PROCEDURE — 3077F SYST BP >= 140 MM HG: CPT | Performed by: INTERNAL MEDICINE

## 2023-10-17 RX ORDER — GLIMEPIRIDE 4 MG/1
8 TABLET ORAL
Qty: 180 TABLET | Refills: 1 | Status: SHIPPED | OUTPATIENT
Start: 2023-10-17

## 2023-10-17 RX ORDER — NICOTINE POLACRILEX 4 MG/1
20 GUM, CHEWING ORAL
Qty: 90 TABLET | Refills: 3 | Status: SHIPPED | OUTPATIENT
Start: 2023-10-17 | End: 2023-11-16

## 2023-10-17 RX ORDER — HYDRALAZINE HYDROCHLORIDE 25 MG/1
25 TABLET, FILM COATED ORAL 2 TIMES DAILY
Qty: 180 TABLET | Refills: 3 | Status: SHIPPED | OUTPATIENT
Start: 2023-10-17

## 2023-10-17 RX ORDER — ATORVASTATIN CALCIUM 80 MG/1
80 TABLET, FILM COATED ORAL DAILY
Qty: 90 TABLET | Refills: 1 | Status: SHIPPED | OUTPATIENT
Start: 2023-10-17

## 2023-10-17 RX ORDER — HYDROCHLOROTHIAZIDE 25 MG/1
25 TABLET ORAL DAILY
Qty: 90 TABLET | Refills: 1 | Status: SHIPPED | OUTPATIENT
Start: 2023-10-17

## 2023-10-17 RX ORDER — INSULIN GLARGINE 300 U/ML
INJECTION, SOLUTION SUBCUTANEOUS
Qty: 12 ML | Refills: 0 | Status: SHIPPED | OUTPATIENT
Start: 2023-10-17

## 2023-12-22 DIAGNOSIS — E11.9 INSULIN DEPENDENT TYPE 2 DIABETES MELLITUS (HCC): ICD-10-CM

## 2023-12-22 DIAGNOSIS — Z79.4 INSULIN DEPENDENT TYPE 2 DIABETES MELLITUS (HCC): ICD-10-CM

## 2023-12-22 RX ORDER — GLUCOSAMINE HCL/CHONDROITIN SU 500-400 MG
CAPSULE ORAL
Qty: 200 STRIP | Refills: 3 | Status: SHIPPED | OUTPATIENT
Start: 2023-12-22

## 2023-12-22 NOTE — TELEPHONE ENCOUNTER
Refill needed. Glucose Blood (BLOOD GLUCOSE TEST) In Vitro Strip     Patient is out.     [address] [facility], [address] [facility] [address] [facility] [address] [phone], [phone]

## 2023-12-22 NOTE — TELEPHONE ENCOUNTER
LOV: 10/17/23   RTC: 6 months   Filled: 9/12/22 # 200 3 refills   Labs: 8/17/23   Future Appointments   Date Time Provider Kwan Carmichael   2/5/2024 10:00 AM Doris Jeffery MD [facility] [place]

## 2024-01-11 DIAGNOSIS — E11.9 INSULIN DEPENDENT TYPE 2 DIABETES MELLITUS (HCC): ICD-10-CM

## 2024-01-11 DIAGNOSIS — Z79.4 INSULIN DEPENDENT TYPE 2 DIABETES MELLITUS (HCC): ICD-10-CM

## 2024-01-11 NOTE — TELEPHONE ENCOUNTER
Patient called to request refill on   Glucose Blood (BLOOD GLUCOSE TEST) In Vitro Strip - ACCU Check    [facility] [address] [place] [address] - [address] [place] [address] [phone], [phone]

## 2024-01-12 RX ORDER — GLUCOSAMINE HCL/CHONDROITIN SU 500-400 MG
CAPSULE ORAL
Qty: 200 STRIP | Refills: 3 | Status: SHIPPED | OUTPATIENT
Start: 2024-01-12

## 2024-02-02 ENCOUNTER — LAB ENCOUNTER (OUTPATIENT)
Dept: LAB | Age: 74
End: 2024-02-02
Attending: INTERNAL MEDICINE
Payer: MEDICARE

## 2024-02-02 DIAGNOSIS — E11.69 HYPERLIPIDEMIA ASSOCIATED WITH TYPE 2 DIABETES MELLITUS  (HCC): ICD-10-CM

## 2024-02-02 DIAGNOSIS — R80.9 DIABETES MELLITUS WITH ALBUMINURIA  (HCC): ICD-10-CM

## 2024-02-02 DIAGNOSIS — E11.65 UNCONTROLLED TYPE 2 DIABETES MELLITUS WITH HYPERGLYCEMIA (HCC): ICD-10-CM

## 2024-02-02 DIAGNOSIS — E11.69 DIABETES MELLITUS TYPE 2 IN OBESE  (HCC): ICD-10-CM

## 2024-02-02 DIAGNOSIS — I15.2 HYPERTENSION ASSOCIATED WITH DIABETES  (HCC): ICD-10-CM

## 2024-02-02 DIAGNOSIS — E11.59 HYPERTENSION ASSOCIATED WITH DIABETES  (HCC): ICD-10-CM

## 2024-02-02 DIAGNOSIS — E11.29 DIABETES MELLITUS WITH ALBUMINURIA  (HCC): ICD-10-CM

## 2024-02-02 DIAGNOSIS — E78.5 HYPERLIPIDEMIA ASSOCIATED WITH TYPE 2 DIABETES MELLITUS  (HCC): ICD-10-CM

## 2024-02-02 DIAGNOSIS — E66.9 DIABETES MELLITUS TYPE 2 IN OBESE  (HCC): ICD-10-CM

## 2024-02-02 LAB
ALT SERPL-CCNC: 29 U/L
ANION GAP SERPL CALC-SCNC: 7 MMOL/L (ref 0–18)
AST SERPL-CCNC: 23 U/L (ref 15–37)
BUN BLD-MCNC: 16 MG/DL (ref 9–23)
CALCIUM BLD-MCNC: 9.7 MG/DL (ref 8.5–10.1)
CHLORIDE SERPL-SCNC: 109 MMOL/L (ref 98–112)
CHOLEST SERPL-MCNC: 170 MG/DL (ref ?–200)
CO2 SERPL-SCNC: 26 MMOL/L (ref 21–32)
CREAT BLD-MCNC: 0.9 MG/DL
EGFRCR SERPLBLD CKD-EPI 2021: 68 ML/MIN/1.73M2 (ref 60–?)
EST. AVERAGE GLUCOSE BLD GHB EST-MCNC: 243 MG/DL (ref 68–126)
FASTING PATIENT LIPID ANSWER: YES
FASTING STATUS PATIENT QL REPORTED: YES
GLUCOSE BLD-MCNC: 112 MG/DL (ref 70–99)
HBA1C MFR BLD: 10.1 % (ref ?–5.7)
HDLC SERPL-MCNC: 70 MG/DL (ref 40–59)
LDLC SERPL CALC-MCNC: 75 MG/DL (ref ?–100)
NONHDLC SERPL-MCNC: 100 MG/DL (ref ?–130)
OSMOLALITY SERPL CALC.SUM OF ELEC: 296 MOSM/KG (ref 275–295)
POTASSIUM SERPL-SCNC: 3.7 MMOL/L (ref 3.5–5.1)
SODIUM SERPL-SCNC: 142 MMOL/L (ref 136–145)
TRIGL SERPL-MCNC: 147 MG/DL (ref 30–149)
VIT D+METAB SERPL-MCNC: 15.6 NG/ML (ref 30–100)
VLDLC SERPL CALC-MCNC: 23 MG/DL (ref 0–30)

## 2024-02-02 PROCEDURE — 36415 COLL VENOUS BLD VENIPUNCTURE: CPT

## 2024-02-02 PROCEDURE — 80048 BASIC METABOLIC PNL TOTAL CA: CPT

## 2024-02-02 PROCEDURE — 80061 LIPID PANEL: CPT

## 2024-02-02 PROCEDURE — 84460 ALANINE AMINO (ALT) (SGPT): CPT

## 2024-02-02 PROCEDURE — 83036 HEMOGLOBIN GLYCOSYLATED A1C: CPT

## 2024-02-02 PROCEDURE — 82306 VITAMIN D 25 HYDROXY: CPT

## 2024-02-02 PROCEDURE — 84450 TRANSFERASE (AST) (SGOT): CPT

## 2024-02-05 ENCOUNTER — LAB ENCOUNTER (OUTPATIENT)
Dept: LAB | Age: 74
End: 2024-02-05
Attending: INTERNAL MEDICINE
Payer: MEDICARE

## 2024-02-05 ENCOUNTER — OFFICE VISIT (OUTPATIENT)
Dept: INTERNAL MEDICINE CLINIC | Facility: CLINIC | Age: 74
End: 2024-02-05
Payer: MEDICARE

## 2024-02-05 VITALS
SYSTOLIC BLOOD PRESSURE: 160 MMHG | OXYGEN SATURATION: 98 % | TEMPERATURE: 98 F | BODY MASS INDEX: 31.26 KG/M2 | HEIGHT: 58.5 IN | HEART RATE: 76 BPM | RESPIRATION RATE: 16 BRPM | WEIGHT: 153 LBS | DIASTOLIC BLOOD PRESSURE: 60 MMHG

## 2024-02-05 DIAGNOSIS — E11.69 HYPERLIPIDEMIA ASSOCIATED WITH TYPE 2 DIABETES MELLITUS  (HCC): ICD-10-CM

## 2024-02-05 DIAGNOSIS — E66.9 DIABETES MELLITUS TYPE 2 IN OBESE  (HCC): ICD-10-CM

## 2024-02-05 DIAGNOSIS — E11.29 DIABETES MELLITUS WITH ALBUMINURIA  (HCC): ICD-10-CM

## 2024-02-05 DIAGNOSIS — E11.59 HYPERTENSION ASSOCIATED WITH DIABETES  (HCC): ICD-10-CM

## 2024-02-05 DIAGNOSIS — F33.1 MDD (MAJOR DEPRESSIVE DISORDER), RECURRENT EPISODE, MODERATE (HCC): ICD-10-CM

## 2024-02-05 DIAGNOSIS — R80.9 DIABETES MELLITUS WITH ALBUMINURIA  (HCC): ICD-10-CM

## 2024-02-05 DIAGNOSIS — Z00.00 ROUTINE GENERAL MEDICAL EXAMINATION AT A HEALTH CARE FACILITY: Primary | Chronic | ICD-10-CM

## 2024-02-05 DIAGNOSIS — E11.69 DIABETES MELLITUS TYPE 2 IN OBESE  (HCC): ICD-10-CM

## 2024-02-05 DIAGNOSIS — Z79.4 INSULIN DEPENDENT TYPE 2 DIABETES MELLITUS (HCC): ICD-10-CM

## 2024-02-05 DIAGNOSIS — E11.3513 PROLIFERATIVE DIABETIC RETINOPATHY OF BOTH EYES WITH MACULAR EDEMA ASSOCIATED WITH TYPE 2 DIABETES MELLITUS (HCC): ICD-10-CM

## 2024-02-05 DIAGNOSIS — E78.5 HYPERLIPIDEMIA ASSOCIATED WITH TYPE 2 DIABETES MELLITUS  (HCC): ICD-10-CM

## 2024-02-05 DIAGNOSIS — E11.65 UNCONTROLLED TYPE 2 DIABETES MELLITUS WITH HYPERGLYCEMIA (HCC): ICD-10-CM

## 2024-02-05 DIAGNOSIS — Z12.31 VISIT FOR SCREENING MAMMOGRAM: ICD-10-CM

## 2024-02-05 DIAGNOSIS — E11.9 INSULIN DEPENDENT TYPE 2 DIABETES MELLITUS (HCC): ICD-10-CM

## 2024-02-05 DIAGNOSIS — I15.2 HYPERTENSION ASSOCIATED WITH DIABETES  (HCC): ICD-10-CM

## 2024-02-05 DIAGNOSIS — K74.60 HEPATIC CIRRHOSIS, UNSPECIFIED HEPATIC CIRRHOSIS TYPE, UNSPECIFIED WHETHER ASCITES PRESENT (HCC): ICD-10-CM

## 2024-02-05 PROBLEM — D69.6 THROMBOCYTOPENIA (HCC): Status: ACTIVE | Noted: 2023-10-17

## 2024-02-05 PROBLEM — D69.6 THROMBOCYTOPENIA: Status: ACTIVE | Noted: 2023-10-17

## 2024-02-05 PROBLEM — D69.6 THROMBOCYTOPENIA: Status: RESOLVED | Noted: 2023-10-17 | Resolved: 2024-02-05

## 2024-02-05 PROBLEM — D64.9 NORMOCYTIC ANEMIA: Status: RESOLVED | Noted: 2022-09-12 | Resolved: 2024-02-05

## 2024-02-05 PROBLEM — D69.6 THROMBOCYTOPENIA (HCC): Status: RESOLVED | Noted: 2023-10-17 | Resolved: 2024-02-05

## 2024-02-05 LAB
CREAT UR-SCNC: 69.5 MG/DL
MICROALBUMIN UR-MCNC: 48.3 MG/DL
MICROALBUMIN/CREAT 24H UR-RTO: 695 UG/MG (ref ?–30)

## 2024-02-05 PROCEDURE — 82570 ASSAY OF URINE CREATININE: CPT

## 2024-02-05 PROCEDURE — 82043 UR ALBUMIN QUANTITATIVE: CPT

## 2024-02-05 NOTE — PROGRESS NOTES
Celeste Mann is a 73 year old female.     HPI:   Patient presents for the following issues and MAS.  ID # [id number]. She has not reviewed her lab notes. Her daughter does have access to her mychart but does not pay attention to her. She lives with her daughter and her estranged .   DM - remains uncontrolled. She insists she is taking her toujeo BID but refills do not match up.  She is checking her FBS in mornings only and they are 250-300s. We have tried to have her wear CGM in past but she did not remain adherent. She experienced hypoglycemia with sugar of 66 on 2/1/2024 in middle of night. She was symptomatic.   HLP  - she is not sure if she is taking atorvastatin 40 or 80 mg  Vitamin D Deficiency - she is not taking supplement.   Obese - weight is stable. She feels she eats a lot.  24 hour dietary recall  Breakfast yesterday - steak and eggs and potatos, sprite  Lunch yesterday - skipped  Dinner yesterday - bagel, soy milk  She exercises daily on a stationary bike for around 30 minutes.   DM eye exam - states she has upcoming OV on 2/16  Screen for breast cancer - she want to continue screening.   HTN - she is currently taking losartan 50 mg once daily that is from 2021. She wanted to finish this before starting 100 mg.   Goals of care - her granddaughter brought her.         REVIEW OF SYSTEMS:   GENERAL HEALTH: feels well otherwise. No f/c  NEURO: denies any headaches, LH, dizzyness, LOC, falls  VISION: denies any blurred. Occ has double vision.   RESPIRATORY: denies shortness of breath, cough, or congestion  CARDIOVASCULAR: denies chest pain, pressure or palpitations  GI: denies abdominal pain, diarrhea, n/v, BRBPR, . Occ constipation.   : no dysuria or hematuria or vaginal bleeding  SKIN: denies any unusual skin lesions or rashes  PSYCH: mood is stable. Denies anxiety. She continues to feel depressed but declines assistance. Her main source of sadness is living with her estranged .  Denies sI/HI.   EXT: occ swelling in her ankles      Wt Readings from Last 6 Encounters:   02/05/24 153 lb (69.4 kg)   10/17/23 151 lb 6.4 oz (68.7 kg)   10/08/23 147 lb (66.7 kg)   08/25/23 170 lb (77.1 kg)   05/01/23 152 lb 6.4 oz (69.1 kg)   10/31/22 140 lb (63.5 kg)       Allergies   Allergen Reactions    Penicillins RASH    Jardiance [Empagliflozin] ITCHING     Vaginal itching       Family History   Problem Relation Age of Onset    Cancer Father         stomach cancer    Diabetes Other     Diabetes Son     Diabetes Sister     Diabetes Brother       Past Medical History:   Diagnosis Date    Essential hypertension     High blood pressure     High cholesterol     NAFLD (nonalcoholic fatty liver disease) 01/28/2019    Cirrhosis, Grade 0-1 varices    Nephropathy     Type II or unspecified type diabetes mellitus without mention of complication, not stated as uncontrolled     Visual impairment     glasses      Past Surgical History:   Procedure Laterality Date    CHOLECYSTECTOMY      COLONOSCOPY  9/9/22= Normal    Repeat PRN    COLONOSCOPY N/A 9/9/2022    Procedure: COLONOSCOPY;  Surgeon: Uche Oneill MD;  Location:  ENDOSCOPY    HYSTERECTOMY  1998    OTHER SURGICAL HISTORY Right     right knee repair but not a replacement    UPPER GI ENDOSCOPY PERFORMED  9/9/22= Grade 0-1 varices.  Gastric Bx:      Social History:    Social History     Socioeconomic History    Marital status:    Tobacco Use    Smoking status: Never    Smokeless tobacco: Never   Vaping Use    Vaping Use: Never used   Substance and Sexual Activity    Alcohol use: Not Currently    Drug use: No   Other Topics Concern    Caffeine Concern Yes    Exercise Yes     Comment: 1-2x per week               EXAM:   /60 (BP Location: Left arm)   Pulse 76   Temp 97.9 °F (36.6 °C) (Temporal)   Resp 16   Ht 4' 10.5\" (1.486 m)   Wt 153 lb (69.4 kg)   SpO2 98%   BMI 31.43 kg/m²   GENERAL: A&O well developed, well nourished,in no apparent  distress  SKIN: no rashes,no suspicious lesions  HEENT: atraumatic, MMM, throat is clear  NECK: supple, no jvd, no thyromegaly  LUNGS: clear to auscultation bilateraly, no c/w/r  CARDIO: RRR without g/m/r  GI: obese, softly distended, non-tender, no HSM, normal BS throughotu  NEURO: CN 2-12 grossly intact  PSYCH: pleasant  EXTREMITIES: no cyanosis, clubbing or edema  Bilateral barefoot skin diabetic exam is normal, visualized feet and the appearance is normal.  Bilateral monofilament/sensation of both feet is normal.  Pulsation pedal pulse exam of both lower legs/feet is normal as well.    ASSESSMENT AND PLAN:   # Osteopenia of Femoral Neck or Femoral Neck: per DEXA in 2023. FRAX score 10/1% respectively. Thus no indication for pharmacologic tx at this time.   - educated on dietary calcium/vit D3 weight bearing exercises   # Vitamin D Deficiency - needs to start supplement.   # Moderate MDD: feels her symptoms are stable. she does not want to be on meds.she prefers focusing on her niurka. Does not want to participate in counseling.   - wellbutrin caused constipation.   # Normocytic Anemia and Thrombocytopenia: RESOLVED. HGB electropheresis normal in 9/2022.   # Systolic Murmur: normal TTE in 2019.   # Uncontrolled Insulin Dependent Diabetes c/b hypoglycemia: uncontrolled in 2/2024. Referred to DM team. She needs CGM.   - Challenging case and patient is very non-adherent. Very adamantly against increasing insulin.  Continue toujeo, metformin and glimeperide.   - extensively counseled on health plant based nutrition  - DM eye exam on 9/6/2022 by Hayes Thornton - proliferative diabetic retinopathy with macular edema of right eye. Has proliferative diabetic retinopathy of left eye without macular edema. She has an upcoming appt.   - Foot Exam: done 2024   - LDL: see below   - BP: see below  - micro alb:creat > 30 in 2023, see below   - Depression Screen: done 2024  - not on ASA  # Albuminuria in Type 2 DM: cont ARB  and needs to improve glycemic control.   # Proliferative diabetic retinopathy and clinically significant macular edema b/l: following with ophtho, needs improved glycemic control  # HTN in T2 DM: uncontrolled. She needs to increase her losartan to 100 mg and cont hydralazine and hydrochlorothiazide.   # HLP assoc with DM: near goal on atorvastatin but she needs to let us know which dose she is taking.   # cirrhosis of liver: detected by liver US in 2014.  Weight loss is paramount. LFTs are normal. Could not afford victoza.  # DM in Obese, BMI 31: Counseled on healthy plant based nutrition, regular exercise, and practicing mindfulness. We outlined small, achievable goals.   # Health Maintenance: medicare supervisit on 2/5/2024   Colon Cancer Screening: normal colonoscopy on 9/9/2022 by Uche Oneill. Repeat in 10 years pending her health.   Cervical Cancer Screening: s/p [facility]  Breast Cancer Screening: continue yearly mammogram.   Bone Health/Fall Prevention (Tim Chi): see above   Vaccines: up to date with prevnar 13 and [id number]. Educated on shingrix and TDAP., RSV, flu and COVID   Hep C screening (born [id number]): Ab neg 2019  Medical POA: daughter, Carmen Mann is primary        Previous PCP - Jessica GOULD - Keagan Gavin        The patient indicates understanding of these issues and agrees to the plan.  The patient is asked to return in 6 months for DM and HTN.   Marga Ferreira MD

## 2024-02-05 NOTE — PATIENT INSTRUCTIONS
Debe duane 4000 unidades de vitamina D3 todos los días de por megan. Por favor comience esto lo antes posible.    Debe realizarse la mamografía el 15 de mayo de 2024.    Recomiendo las siguientes vacunas:  Vacuna contra el VRS lo antes posible.    Vacuna TDAP (tétanos, difteria, tos ferina) cada 10 años.    Vacuna Shingrix shamir vez en la megan. Es shamir vacuna de dos pasos que se administra con un intervalo de 2 a 6 meses.    Vacuna anual contra la gripe cada septiembre y octubre.    Manténgase al día con las vacunas COVID.    En [place]  You need to be on vitamin D3 4000 units every day for life. Please start this as soon as possible.     You are due for your mammogram on May 15th, 2024.     I recommend the following vaccines -  RSV vaccine as soon as possible.     TDAP (tetanus, diptheriae, pertussis) vaccine every 10 years.     Shingrix vaccine once in a lifetime. It is a two step vaccine given 2-6 months apart.     Annual flu shot every September, October.    Stay up to date with COVID vaccines.

## 2024-02-09 ENCOUNTER — TELEPHONE (OUTPATIENT)
Dept: INTERNAL MEDICINE CLINIC | Facility: CLINIC | Age: 74
End: 2024-02-09

## 2024-02-09 DIAGNOSIS — E11.65 UNCONTROLLED TYPE 2 DIABETES MELLITUS WITH HYPERGLYCEMIA (HCC): Primary | ICD-10-CM

## 2024-02-09 NOTE — TELEPHONE ENCOUNTER
Referral needed.    Appt 02/16/2024  Diabetic Eye Exam    JULIANO SILVA MD  Ophthalmology  [address] [place] [address] [phone]

## 2024-02-10 NOTE — ED INITIAL ASSESSMENT (HPI)
Pt states Monday night rolled out of bed and and landed onto right side, co right rib and and left lower back pain  No head injury, states diarrhea for 2 days and nausea, no vomiting
Maynor

## 2024-02-12 NOTE — TELEPHONE ENCOUNTER
Chelsie/Clinical Intake [employer]   [phone]    [phone]  Reference #[id number]    Provider OON with [employer] plan   Additional information to support OON provider approval needed such as relevant sxs, diagnosis, H&P, Treatment plan, OV notes

## 2024-02-13 RX ORDER — INSULIN GLARGINE 300 U/ML
INJECTION, SOLUTION SUBCUTANEOUS
Qty: 12 ML | Refills: 0 | Status: SHIPPED | OUTPATIENT
Start: 2024-02-13

## 2024-02-13 NOTE — TELEPHONE ENCOUNTER
Language line used:   Raj [id number]    During call about referral, patient ask about refill. Please advise. Thank you!

## 2024-02-13 NOTE — TELEPHONE ENCOUNTER
Requested Renewals     Name from pharmacy: [facility] 300 UNIT/ML Subcutaneous Solution Pen-injector         Will file in chart as: TOUJEO SOLOSTAR 300 UNIT/ML Subcutaneous Solution Pen-injector    Sig: INJECT 40 UNITS SUBCUTANEOUSLY IN THE MORNING AND 42 IN THE EVENING    Disp: 12 mL    Refills: 0    Start: 2/12/2024    Class: Normal    Non-formulary    Last ordered: 3 months ago (10/17/2023) by Marga Ferreira MD    Last refill: 1/15/2023    Rx #: [id number]    Diabetes Medication Protocol [id number]/12/2024 02:08 PM   Protocol Details Last A1C < 7.5 and within past 6 months    In person appointment or virtual visit in the past 6 mos or appointment in next 3 mos    Microalbumin procedure in past 12 months or taking ACE/ARB    EGFRCR or GFRNAA > 50    GFR in the past 12 months      To be filled at: [facility] [address] [place] [address] [phone], [phone]        Last OV 02/05/2024  RTC 08/2024  No upcoming appt  Most recent labs 02/05/2024

## 2024-04-04 DIAGNOSIS — E11.65 UNCONTROLLED TYPE 2 DIABETES MELLITUS WITH HYPERGLYCEMIA (HCC): ICD-10-CM

## 2024-04-04 DIAGNOSIS — Z79.4 TYPE 2 DIABETES MELLITUS TREATED WITH INSULIN (HCC): ICD-10-CM

## 2024-04-04 DIAGNOSIS — E11.9 TYPE 2 DIABETES MELLITUS TREATED WITH INSULIN (HCC): ICD-10-CM

## 2024-04-04 RX ORDER — INSULIN GLARGINE 300 U/ML
INJECTION, SOLUTION SUBCUTANEOUS
Qty: 12 ML | Refills: 3 | Status: SHIPPED | OUTPATIENT
Start: 2024-04-04

## 2024-04-04 NOTE — TELEPHONE ENCOUNTER
Pt called requesting refill because pharmacy stated she doesn't have additional refills on file. [id number]    metFORMIN HCl 1000 MG Oral Tab 180 tablet     Insulin Glargine, 1 Unit Dial, (TOUJEO SOLOSTAR) 300 UNIT/ML Subcutaneous Solution Pen-injector 12 mL       Also, Pt broke the glucose monitor and is requesting a new one.

## 2024-05-01 ENCOUNTER — TELEPHONE (OUTPATIENT)
Dept: INTERNAL MEDICINE CLINIC | Facility: CLINIC | Age: 74
End: 2024-05-01

## 2024-05-01 DIAGNOSIS — E11.65 UNCONTROLLED TYPE 2 DIABETES MELLITUS WITH HYPERGLYCEMIA (HCC): Primary | ICD-10-CM

## 2024-05-01 NOTE — TELEPHONE ENCOUNTER
Appt scheduled.    Future Appointments   Date Time Provider Department Center   5/16/2024  9:20 AM BERNA BEAR [facility]   8/9/2024 10:30 AM Marga Ferreira MD [facility] [place]     Patient would like to know if Dr. Ferreira would like to order labs for her 6 mo f/u appt.

## 2024-05-08 ENCOUNTER — LAB ENCOUNTER (OUTPATIENT)
Dept: LAB | Age: 74
End: 2024-05-08
Attending: INTERNAL MEDICINE
Payer: MEDICARE

## 2024-05-08 DIAGNOSIS — E11.65 UNCONTROLLED TYPE 2 DIABETES MELLITUS WITH HYPERGLYCEMIA (HCC): ICD-10-CM

## 2024-05-08 LAB
EST. AVERAGE GLUCOSE BLD GHB EST-MCNC: 235 MG/DL (ref 68–126)
HBA1C MFR BLD: 9.8 % (ref ?–5.7)

## 2024-05-08 PROCEDURE — 83036 HEMOGLOBIN GLYCOSYLATED A1C: CPT

## 2024-05-10 ENCOUNTER — HOSPITAL ENCOUNTER (EMERGENCY)
Age: 74
Discharge: HOME | End: 2024-05-10
Attending: EMERGENCY MEDICINE

## 2024-05-10 ENCOUNTER — TELEPHONE (OUTPATIENT)
Dept: ORTHOPEDICS CLINIC | Facility: CLINIC | Age: 74
End: 2024-05-10

## 2024-05-10 ENCOUNTER — APPOINTMENT (OUTPATIENT)
Dept: GENERAL RADIOLOGY | Age: 74
End: 2024-05-10

## 2024-05-10 VITALS
HEIGHT: 61 IN | HEART RATE: 74 BPM | SYSTOLIC BLOOD PRESSURE: 163 MMHG | DIASTOLIC BLOOD PRESSURE: 60 MMHG | TEMPERATURE: 98 F | RESPIRATION RATE: 12 BRPM | BODY MASS INDEX: 30.21 KG/M2 | OXYGEN SATURATION: 98 % | WEIGHT: 160 LBS

## 2024-05-10 DIAGNOSIS — S42.214A CLOSED NONDISPLACED FRACTURE OF SURGICAL NECK OF RIGHT HUMERUS, UNSPECIFIED FRACTURE MORPHOLOGY, INITIAL ENCOUNTER: Primary | ICD-10-CM

## 2024-05-10 PROCEDURE — 99283 EMERGENCY DEPT VISIT LOW MDM: CPT

## 2024-05-10 PROCEDURE — 99284 EMERGENCY DEPT VISIT MOD MDM: CPT

## 2024-05-10 PROCEDURE — 73060 X-RAY EXAM OF HUMERUS: CPT

## 2024-05-10 RX ORDER — HYDROCODONE BITARTRATE AND ACETAMINOPHEN 5; 325 MG/1; MG/1
1-2 TABLET ORAL EVERY 6 HOURS PRN
Qty: 10 TABLET | Refills: 0 | Status: SHIPPED | OUTPATIENT
Start: 2024-05-10 | End: 2024-05-13

## 2024-05-10 RX ORDER — IBUPROFEN 600 MG/1
600 TABLET ORAL ONCE
Status: COMPLETED | OUTPATIENT
Start: 2024-05-10 | End: 2024-05-10

## 2024-05-10 NOTE — ED INITIAL ASSESSMENT (HPI)
1/15 Left inguinal hernia noted; easily reducible    Plan:  Follow clinically and assess for signs of intestinal occlusion  Will need surgical repair when appropriate   Pt fell walking into a school on r humerus and r ear. No loc not on blood thinners

## 2024-05-10 NOTE — ED PROVIDER NOTES
Patient Seen in: [facility] In [place]      History     Chief Complaint   Patient presents with    Fall     Stated Complaint: fall an hour ago injuring her r shoulder    Subjective:   HPI    CHIEF COMPLAINT: Right shoulder pain     HISTORY OF PRESENT ILLNESS: Patient is a 73-year-old female presenting for evaluation of right shoulder pain after a fall.  She is accompanied by her daughter who helps give the history.  Patient states that she was on her way into her granddaughters school to watch a horse concert when she tripped over her feet and fell, landing on her right shoulder.  Since then she has had pain in the right shoulder.  Denies numbness, tingling in the fingers.  Denies head injury.  No LOC.  No headache, nausea, vomiting or vision change.  No dizziness.  Patient is right-hand dominant.     REVIEW OF SYSTEMS:  Constitutional: no fever, no chills  Eyes: no discharge  ENT: no sore throat  Cardiovascular: no chest pain, no palpitations  Respiratory: no cough, no shortness of breath  Gastrointestinal: no abdominal pain, no vomiting  Genitourinary: no hematuria  Musculoskeletal: no back pain  Skin: no rashes  Neurological: no headache     Otherwise a complete review of systems was obtained and other than the HPI was negative     The patient's medication list, past medical history and social history elements is as listed in today's nurse's notes are reviewed and agree. The patient's family history is reviewed and is noncontributory to the presenting problem, except as indicated as above.    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  Gastric Bx:                Social History     Socioeconomic History    Marital status:    Tobacco Use    Smoking status: Never    Smokeless tobacco: Never   Vaping Use    Vaping status: Never Used   Substance and Sexual Activity    Alcohol use: Not Currently    Drug use: No   Other Topics Concern    Caffeine Concern Yes    Exercise Yes     Comment: 1-2x per week     Social Determinants of Health     Food Insecurity: No Food Insecurity (6/19/2022)    Received from [email or web address]    Food Insecurity     Within the past 12 months, you worried that your food would run out before you got the money to buy more.: 1     Within the past 12 months, the food you bought just didn't last and you didn't have money to get more.: 1   Transportation Needs: No Transportation Needs (6/19/2022)    Received from [email or web address]    Transportation Needs     In the past 12 months, has lack of transportation kept you from medical appointments or from getting medications?: 2     In the past 12 months, has lack of transportation kept you from meetings, work, or from getting things needed for daily living?: 2    Received from [email or web address]    [facility]              Review of Systems    Positive for stated complaint: fall an hour ago injuring her r shoulder  Other systems are as noted in HPI.  Constitutional and vital signs reviewed.      All other systems reviewed and negative except as noted above.    Physical Exam     ED Triage Vitals [05/10/24 1030]   BP (!) 188/82   Pulse 73   Resp 18   Temp 98 °F (36.7 °C)   Temp src Temporal   SpO2 98 %   O2 Device None (Room air)        Current Vitals:   Vital Signs  BP: (!) 163/60  Pulse: 74  Resp: 12  Temp: 98 °F (36.7 °C)  Temp src: Temporal    Oxygen Therapy  SpO2: 98 %  O2 Device: None (Room air)            Physical Exam    Vital signs and nursing notes reviewed  General Appearance: Patient is alert and oriented x4 in no acute distress  Eyes: pupils equal and round, reactive to light. No pallor or injection, no sclera icterus  Ears: Bilateral TMs and canals clear  Throat: There is no erythema or exudates, no tonsillar hypertrophy.  no trismus or stridor. Uvula midline. No phonation changes, patient handling secretions well. Mucous membranes moist.  Respiratory: there are no retractions, lungs are clear to auscultation  Cardiovascular: regular rate and rhythm  Neurological:  Grossly intact, no deficits.  Cranial nerves are intact, 5 out of 5 symmetric upper and lower extremity motor strength. Gait normal.  Skin:  warm and dry, no rashes.  No jaundice. Brisk capillary refill  Musculoskeletal: neck is supple, no lymphadenopathy, non tender. no meningeal signs.  Extremities are symmetrical, full range of motion  Psychiatric: calm and cooperative  Right upper extremity: No deformity noted.  No tenderness to palpation across the right clavicle.  There is tenderness to palpation of the right lateral shoulder and mild tenderness to palpation to the right posterior shoulder.  2+ radial pulses.  5 out of 5  strength.  Normal sensation of the distal digits.  Full range of motion of the wrist.    ED Course   Labs Reviewed - No data to display          XR HUMERUS (MIN 2 VIEWS), RIGHT (CPT=73060)    Result Date: 5/10/2024  PROCEDURE:  XR HUMERUS (MIN 2 VIEWS), RIGHT(CPT=73060)  INDICATIONS:  fall an hour ago injuring her r shoulder, pain  COMPARISON:  None.  PATIENT STATED HISTORY: (As transcribed by Technologist)  Per patient's daughter, patient fell and landed on her right arm today.  She has pain at the midshaft of the right humerus with  limited range of motion.    FINDINGS:  There is a fracture through the right humeral neck with extension through the right greater tuberosity.  Mild diffuse osteopenia is also likely present.            CONCLUSION:  See above.   LOCATION:  [facility]   Dictated by (CST): Ladarius Tran MD on 5/10/2024 at 11:06 AM     Finalized by (CST): Ladarius Tran MD on 5/10/2024 at 11:08 AM        I personally reviewed the images.  There is a fracture at the proximal humerus through the neck.       MDM      This is a 73-year-old female presenting for right shoulder pain after a fall earlier today.  Denied head injury or LOC.  She was noted to be neurologically intact here today.  She did have reproducible tenderness to palpation over the right lateral, proximal humerus.  X-rays show a humeral neck fracture.  Will place patient in a sling.  She is instructed to follow with Ortho.  Referral was given.  Will send Norco to take as needed for pain management.  Discussed this medicine may make her feel dizzy or sleepy.  Do not drink alcohol or drive while on this medication.  If there are any new, changing or worsening symptoms go to the ER.  Patient voiced understanding to the treatment plan.  All questions answered                                   Medical Decision Making  Amount and/or Complexity of Data Reviewed  Independent Historian: friend     Details: Daughter  Radiology: ordered and independent interpretation performed. Decision-making details documented in ED Course.    Risk  Prescription drug management.        Disposition and Plan     Clinical Impression:  1. Closed nondisplaced fracture of surgical neck of right humerus, unspecified fracture morphology, initial encounter         Disposition:  Discharge  5/10/2024 12:12 pm    Follow-up:  Damián Coleman MD  [address] [place] [id number]  [phone]    Call today            Medications Prescribed:  Discharge Medication List as of 5/10/2024 12:13 PM        START taking  these medications    Details   HYDROcodone-acetaminophen 5-325 MG Oral Tab Take 1-2 tablets by mouth every 6 (six) hours as needed., Normal, Disp-10 tablet, R-0

## 2024-05-10 NOTE — TELEPHONE ENCOUNTER
73 yr old female.  Can you see? If  so when? Thanks!      DOI 5/10/24 Fall    XR 5/10/24   FINDINGS:    There is a fracture through the right humeral neck with extension through the right greater tuberosity.  Mild diffuse osteopenia is also likely present.

## 2024-05-10 NOTE — TELEPHONE ENCOUNTER
01/19/22 0900   Reason for Consult   Reason for Consult Initial assessment;Diagnosis/procedure education   Developmental and Communication Consideration Verbal delay;Documented delays  (Downs Syndrome)   Diagnosis/Procedure Procedural/surgery   Assisting During Procedure Surgery   Patient Intervention(s)   Type of Intervention Performed Procedural support   Procedural Support Intervention(s) Comfort positioning;Verbal reassurance   Support Provided to Family   Support Provided to Family   (No family at bedside. Caregivers from [place] with patient.)   Anxiety Level   Anxiety Level Patient displays acute distress/anxiety;Patient displays appropriate distress/anxiety   As Evidenced by: Patient agitated and needed assistance with safe containment.   Evaluation   Patient Behaviors Pre-Interventions Playful   Patient Behaviors During Interventions Interactive;Upset  (upset on OR table.)   Patient Behaviors Post-Intervention(s) Sedated   [facility] accompanied patient to the OR and provided support during anesthesia induction.     Dara Rodgers MA, CCLS  Phone     Patient was seen in the ER today for a fx of the right humerus. Please advise when she should be seen.

## 2024-05-13 ENCOUNTER — PATIENT OUTREACH (OUTPATIENT)
Dept: CASE MANAGEMENT | Age: 74
End: 2024-05-13

## 2024-05-13 ENCOUNTER — TELEPHONE (OUTPATIENT)
Dept: INTERNAL MEDICINE CLINIC | Facility: CLINIC | Age: 74
End: 2024-05-13

## 2024-05-13 DIAGNOSIS — S42.214A CLOSED NONDISPLACED FRACTURE OF SURGICAL NECK OF RIGHT HUMERUS, UNSPECIFIED FRACTURE MORPHOLOGY, INITIAL ENCOUNTER: ICD-10-CM

## 2024-05-13 RX ORDER — HYDROCODONE BITARTRATE AND ACETAMINOPHEN 5; 325 MG/1; MG/1
1-2 TABLET ORAL EVERY 6 HOURS PRN
Qty: 10 TABLET | Refills: 0 | Status: SHIPPED | OUTPATIENT
Start: 2024-05-13 | End: 2024-05-18

## 2024-05-13 NOTE — TELEPHONE ENCOUNTER
Patient's daughter Carmen calling to request pain medication for patient. Patient fractured her shoulder and has a follow up appointment with ortho on 5/15/2024.

## 2024-05-13 NOTE — TELEPHONE ENCOUNTER
S/w Carmen on verbal release. Requesting refill on Hydrocodone 5/325 mg, was given 10 tablets at ED visit on 5/10 for fracture and now only down to 1 tablet.     Trying to take Hydrocodone 5-325 mg  1 tablet every 6 hours and she is alt with Ibuprofen 400 mg. Pended rx for refill if appropriate    Has upcoming appt 5/15/2024 with Dr Drew.    Please advise?      5/10/2024 ED  Clinical Impression:  1. Closed nondisplaced fracture of surgical neck of right humerus, unspecified fracture morphology, initial encounter     Disposition:  Discharge  5/10/2024 12:12 pm     Follow-up:  Damián Coleman MD  [address] [place] [id number] [phone]     Call today

## 2024-05-13 NOTE — TELEPHONE ENCOUNTER
Future Appointments   Date Time Provider Department Center   5/15/2024  9:30 AM Adolfo Drew DO [facility] [address]   5/16/2024  9:20 AM BERNA BEAR [facility]   8/9/2024 10:30 AM Marga Ferreira MD [facility] [place]

## 2024-05-13 NOTE — PROGRESS NOTES
1st attempt ER f/up apt request  No answer, LVMTCB to schedule apt  PCP -existing apt (8/9), unable to contact  ORTHO -existing apt (5/15)  Closing encounter

## 2024-05-15 ENCOUNTER — OFFICE VISIT (OUTPATIENT)
Facility: CLINIC | Age: 74
End: 2024-05-15

## 2024-05-15 VITALS — BODY MASS INDEX: 30.21 KG/M2 | HEIGHT: 61 IN | WEIGHT: 160 LBS

## 2024-05-15 DIAGNOSIS — S42.291A OTHER CLOSED DISPLACED FRACTURE OF PROXIMAL END OF RIGHT HUMERUS, INITIAL ENCOUNTER: Primary | ICD-10-CM

## 2024-05-15 PROCEDURE — 99204 OFFICE O/P NEW MOD 45 MIN: CPT | Performed by: FAMILY MEDICINE

## 2024-05-15 PROCEDURE — 1125F AMNT PAIN NOTED PAIN PRSNT: CPT | Performed by: FAMILY MEDICINE

## 2024-05-15 PROCEDURE — 3008F BODY MASS INDEX DOCD: CPT | Performed by: FAMILY MEDICINE

## 2024-05-15 PROCEDURE — 1159F MED LIST DOCD IN RCRD: CPT | Performed by: FAMILY MEDICINE

## 2024-05-15 RX ORDER — MELOXICAM 15 MG/1
15 TABLET ORAL DAILY
Qty: 15 TABLET | Refills: 0 | Status: SHIPPED | OUTPATIENT
Start: 2024-05-15 | End: 2024-05-30

## 2024-05-15 NOTE — H&P
Sports Medicine Clinic Note    Subjective:    Chief Complaint: Right shoulder pain.    Date of Injury: 5/10/24    History of Present Illness: This 73 year old RHD female patient presents to the clinic with pain and limited mobility in the right shoulder. The pain is described as acute and started after a mechanical fall landing directly on the right shoulder. Patient reports experiencing a \"crack\" sensation at the time of the injury and has had persistent discomfort since the incident. Denies any previous shoulder injuries or surgeries. No numbness or tingling in the arm is reported but the patient is concerned about the reduced function in the affected shoulder as this is the dominant extremity. History of T2DM with A1c of 9.8.    Objective:    Ht 5' 1\" (1.549 m)   Wt 160 lb (72.6 kg)   BMI 30.23 kg/m²     Right Shoulder Examination:    Inspection  - No gross deformity  - Swelling and bruising noted about the shoulder and arm  - No open wounds or drainage  - No evidence of muscle atrophy    Palpation  - Tenderness to palpation at AC joint and globally about the shoulder particularly about the greater tuberosity, no tenderness beyond mid arm    ROM  - Deferred due to nature of injury.    Neurovascular  - SILT axillary / radial / ulnar / median / musculocutaneous nerve distributions  - Fires biceps / triceps / deltoid  - 2+ radial pulse, brisk capillary refill    Special Tests  - Special tests deferred due to pain    Imaging:    Radiographs of the affected shoulder were personally reviewed in the office. There is a comminuted fracture involving the proximal humeral metaphysis with extension into the greater tuberosity. No extension into the glenohumeral articulation. Mild narrowing of the acromioclavicular and glenohumeral joint also noted. The acromiohumeral interval is maintained.    Assessment:    73 year old female with clinical history and examination consistent with    Other closed displaced fracture of  proximal end of right humerus, initial encounter    Plan:    Procedures: We discussed surgical vs. Non-surgical options. The patient has a history of uncontrolled T2DM and we discussed risk of stiffness/limited ROM regardless of ORIF vs conservative management and she elected to try conservative management of this fracture to start. Discussed possibility of conversion to shoulder arthroplasty in the future but will need to optimize her dysglycemia first which she expressed understanding of.    Rest and immobilization of the right shoulder using a sling for comfort.  OTC Medications: Meloxicam as needed for pain and swelling. Can be combined with Tylenol as needed for pain. Flexeril added for added comfort and help with sleep to be taken nightly.  Physical Therapy: Once the initial pain and swelling decrease, the patient should start gentle range of motion exercises. Formal physical therapy referral to commence in 2 weeks tentatively.  Activity/Exercise Recommendation: Limited overhead activities and heavy lifting with the right arm. Encouraged gentle pendulum exercises at home for now if able to tolerate.    Follow-up: Schedule a follow-up appointment in 2 weeks to re-assess with repeat x-rays. If the pain worsens or new symptoms develop, the patient is advised to return sooner or seek emergency care.    Adolfo Drew DO, NEELAM   Primary Care Sports Medicine    Department of Orthopaedic Surgery  [address] [address]   [address] [place] [id number]    t: [phone]  f: [phone]      [facility]

## 2024-05-20 RX ORDER — CYCLOBENZAPRINE HCL 10 MG
10 TABLET ORAL 3 TIMES DAILY
Qty: 30 TABLET | Refills: 1 | Status: SHIPPED | OUTPATIENT
Start: 2024-05-20 | End: 2024-06-09

## 2024-06-04 ENCOUNTER — TELEPHONE (OUTPATIENT)
Dept: ORTHOPEDICS CLINIC | Facility: CLINIC | Age: 74
End: 2024-06-04

## 2024-06-04 DIAGNOSIS — Z09 FRACTURE (HEALED) TREATMENT FOLLOW-UP: Primary | ICD-10-CM

## 2024-06-04 NOTE — TELEPHONE ENCOUNTER
Xray ordered per Ortho protocol, Xray scheduled.  VM sent to patient to arrive 15 - 20 min early to complete imaging.

## 2024-06-05 ENCOUNTER — OFFICE VISIT (OUTPATIENT)
Facility: CLINIC | Age: 74
End: 2024-06-05
Payer: MEDICARE

## 2024-06-05 ENCOUNTER — HOSPITAL ENCOUNTER (OUTPATIENT)
Dept: GENERAL RADIOLOGY | Age: 74
Discharge: HOME | End: 2024-06-05
Attending: FAMILY MEDICINE
Payer: MEDICARE

## 2024-06-05 VITALS — BODY MASS INDEX: 30.21 KG/M2 | HEIGHT: 61 IN | WEIGHT: 160 LBS

## 2024-06-05 DIAGNOSIS — Z09 FRACTURE (HEALED) TREATMENT FOLLOW-UP: ICD-10-CM

## 2024-06-05 DIAGNOSIS — S42.291D OTHER CLOSED DISPLACED FRACTURE OF PROXIMAL END OF RIGHT HUMERUS WITH ROUTINE HEALING, SUBSEQUENT ENCOUNTER: Primary | ICD-10-CM

## 2024-06-05 PROCEDURE — 3008F BODY MASS INDEX DOCD: CPT | Performed by: FAMILY MEDICINE

## 2024-06-05 PROCEDURE — 99214 OFFICE O/P EST MOD 30 MIN: CPT | Performed by: FAMILY MEDICINE

## 2024-06-05 PROCEDURE — 1125F AMNT PAIN NOTED PAIN PRSNT: CPT | Performed by: FAMILY MEDICINE

## 2024-06-05 PROCEDURE — 73060 X-RAY EXAM OF HUMERUS: CPT | Performed by: FAMILY MEDICINE

## 2024-06-05 PROCEDURE — 1159F MED LIST DOCD IN RCRD: CPT | Performed by: FAMILY MEDICINE

## 2024-06-05 PROCEDURE — 1160F RVW MEDS BY RX/DR IN RCRD: CPT | Performed by: FAMILY MEDICINE

## 2024-06-05 RX ORDER — MELOXICAM 15 MG/1
15 TABLET ORAL DAILY
Qty: 15 TABLET | Refills: 0 | Status: SHIPPED | OUTPATIENT
Start: 2024-06-05 | End: 2024-06-20

## 2024-06-05 NOTE — PROGRESS NOTES
Sports Medicine Clinic Note    Subjective:    Chief Complaint: Right shoulder fracture follow-up.    Interval History: This 73-year-old right-hand dominant female returns for follow-up evaluation of her right shoulder after sustaining a comminuted impacted fracture of the proximal humerus. Since her last visit, she reports persistent pain and limited mobility in the shoulder. She has been adhering to the use of a sling for comfort and has been taking Meloxicam and Tylenol for pain management. The patient has not started physical therapy yet but has been performing gentle pendulum exercises at home as tolerated. No new symptoms such as numbness, tingling, or increased pain are reported.    Objective:    Right Shoulder Examination:    Inspection:  - No gross deformity  - Mild residual swelling and bruising noted  - No open wounds or drainage  - No evidence of muscle atrophy    Palpation:  - Improved tenderness to palpation at AC joint and globally about the shoulder particularly about the greater tuberosity, no tenderness beyond mid arm    Range of Motion:  - Deferred    Neurovascular:  - SILT axillary / radial / ulnar / median / musculocutaneous nerve distributions  - Fires biceps / triceps / deltoid  - 2+ radial pulse, brisk capillary refill    Diagnostic Studies:    - Radiographs of the right humerus reviewed. Findings consistent with healing of the known comminuted impacted fracture involving the proximal humeral neck extending to the greater tuberosity. Minimal callus formation noted at the fracture site. Acromioclavicular joint arthropathy present.    Assessment:    73-year-old female with clinical history and examination consistent with healing of a comminuted impacted fracture of the proximal end of the right humerus.    Plan:    Additional imaging/workup: No additional imaging needed at this time as healing is progressing as expected. Continue to monitor with clinical exams and periodic x-rays.    Therapy:  Begin formal physical therapy to include passive range of motion exercises initially, progressing to active range of motion and strengthening exercises as tolerated. Continue with gentle pendulum exercises at home.    Medications: Continue Meloxicam and Tylenol as needed for pain. Flexeril can be continued for sleep as needed.    Bracing/Casting: Continue to use the sling for comfort as needed, especially during periods of increased activity.    Procedures: None indicated at this time.    Activity Recommendations: Limit overhead activities and heavy lifting with the right arm. Encourage the patient to perform gentle exercises to maintain mobility without exacerbating pain.    Follow-Up: Schedule follow-up appointment in 4 weeks to reassess clinical progress and review x-rays. Advise the patient to return earlier if symptoms worsen or new symptoms develop.        Adolfo Drew DO, CAQSM   Primary Care Sports Medicine    Department of Orthopaedic Surgery  [facility]    [address] [place] [address]  [address] [place] [address]    t: [phone]  f: [phone]      [facility]

## 2024-06-13 ENCOUNTER — TELEPHONE (OUTPATIENT)
Dept: PHYSICAL THERAPY | Facility: HOSPITAL | Age: 74
End: 2024-06-13

## 2024-06-17 ENCOUNTER — OFFICE VISIT (OUTPATIENT)
Dept: PHYSICAL THERAPY | Age: 74
End: 2024-06-17
Attending: FAMILY MEDICINE
Payer: MEDICARE

## 2024-06-17 DIAGNOSIS — S42.291A OTHER CLOSED DISPLACED FRACTURE OF PROXIMAL END OF RIGHT HUMERUS, INITIAL ENCOUNTER: Primary | ICD-10-CM

## 2024-06-17 PROCEDURE — 97162 PT EVAL MOD COMPLEX 30 MIN: CPT

## 2024-06-17 PROCEDURE — 97110 THERAPEUTIC EXERCISES: CPT

## 2024-06-17 NOTE — PROGRESS NOTES
SHOULDER EVALUATION:     Diagnosis:    Other closed displaced fracture of proximal end of right humerus with routine healing, subsequent encounter (S48.989W)          Referring Provider: No ref. provider found  Date of Evaluation:    6/17/2024    Precautions:    Limit overhead activities and heavy lifting with the right arm. Next MD visit:   none scheduled  Date of Surgery: n/a       PATIENT SUMMARY   Celeste Mann is a 73 year old female who presents to therapy today with complaints of R shoulder pain with Hx of fall sustaining a comminuted impacted fracture of the proximal humerus on May.She was then placed on arm sling for protection and was given pendulum exercises for home,then referred to PT.    Pt describes pain level  at worst 8/10, current 6/10, at best 5/10.  With movement /activities.Manages pain with tylenol/meloxicam  Pt denies BUE paresthesias.      Recent imaging 6/5/2024 with result stating  Findings consistent with healing of the known comminuted impacted fracture involving the proximal humeral neck extending to the greater tuberosity. Minimal callus formation noted at the fracture site. Acromioclavicular joint arthropathy present.        Current functional limitations include difficulty with self care activities  .   Past medical history was reviewed with Celeste. Significant findings include HTN.DM,visual impairment with glasses, non alcoholic fatty liver disease,nepropathy, Hx of fall     Celeste describes prior level of function not limited Pt goals include to improve use of RUE specially for self care activities         ASSESSMENT  Celeste presents to physical therapy evaluation with primary c/o R shoulder pain and limited used due to pain , decreased range of motion and strength that she has difficulty doing self care activities.The patient injured her shoulder from a fall last month that resulted in proximal humeral fracture. She is currently . The results of the objective tests and  measures show  AROM due to pain and weakness, difficulty performing functional activities needing some assistance.  These deficits contribute to functional limitations noted above in patient summary. Pt and PT discussed evaluation findings, pathology, POC and HEP.  Pt voiced understanding and performs HEP correctly without reported pain. Skilled Physical Therapy is medically necessary to address the above impairments and reach functional goals.     OBJECTIVE:   Observation/Posture: Guarded posture with R arm adducted and elbow flexed  Ambulates with cane for stability     Cervical AROM: (* denotes performed with pain)  Flexion: WFL  Extension: WFL  Sidebending: R WFL ,L WFL  Rotation: R WFL, L WFL    ROM: (* denotes performed with pain)  Shoulder (deg) Elbow (in neutral)   PROM Flexion: 120 deg *  PROM Abduction: 85 *deg   PROM ER: *NT  PROM IR: *NT     AROM (seated) shldr flex 25 deg, abd 30 deg,  Flexion: WNL     Ext  -20 deg * Active         Strength/MMT: (* denotes performed with pain)  Shoulder/elbow Scapular   Flexion: R 2-/5 *  Abduction: 2-/5 )  Elbow R 3/5 N/T B due to s/p (since unable to lay in prone)      Accessory motion: limited R GH jt, scapulothoracic joint  Palpation:  tenderness to palpation at AC joint and globally about the shoulder particularly about the greater tuberosity, no tenderness beyond mid arm     Flexibility: mod decreased on R  pec minor  Special tests:   N/T     FES Score  Score: 98.44 % (2024  3:25 PM)    Score and level of concern: 63 - high concern (2024  3:25 PM)          Today’s Treatment and Response:   Pt education was provided on exam findings, treatment diagnosis, treatment plan, expectations, and prognosis. Pt was also provided recommendations for activity modifications, possible soreness after evaluation, modalities as needed [ice/heat], and postural corrections.She received PROM on all planes of movement to tolerated range.   Patient was instructed in  and issued a HEP for: scapular retractions, table wipes,     Charges: PT Eval Moderate Complexity, Thera Ex x 1      Total Timed Treatment: 10 min     Total Treatment Time: 45 min     Based on clinical rationale and outcome measures, this evaluation involved Moderate Complexity decision making due to 3+ personal factors/comorbidities, 3 body structures involved/activity limitations, and evolving symptoms including changing pain levels and improved function .  PLAN OF CARE:    Goals: ( to be met in 10 visits)    Pt will report improved ability to sleep without waking due to shoulder pain   Pt will improve shoulder flexion AROM to >130 degrees to be able to reach into overhead cabinets without pain or restriction   Pt will improve shoulder abduction AROM to >120 degrees to improve ability to don deodorant, don/doff shirts, and wash hair   Pt will increase shoulder AROM ER to >60 to be able to reach and fasten seatbelt   Pt will increase shoulder AROM IR to >50 to be able to reach in back pocket, tuck in shirt, and turn steering wheel without pain  Pt will improve shoulder strength throughout to 4/5 to improve function with self care or household activities   Pt will demonstrate increased mid/low trap strength to >3/5 to promote improved shoulder mechanics and stabilization with lifting and reaching   Pt will be independent and compliant with comprehensive HEP to maintain progress achieved in PT       (Order summary from Ortho)  Recommend a PT protocol with an initial focus on pain management and reducing inflammation through the application of ice and gentle passive range of motion exercises to maintain joint mobility without compromising the healing process. After the acute phase and based on the physician clearance, usually within the first few weeks post-injury, therapy progresses to include active range of motion exercises, gradually introducing strengthening exercises for the rotator cuff and scapular stabilizers  using resistance bands or light weights, emphasizing careful monitoring of pain and avoiding movements that exacerbate symptoms. Functional exercises tailored to the patient's daily activities and demands are incorporated as the patient demonstrates improved strength and range of motion. Additionally, education on proper posture and ergonomics is provided to prevent future injuries and facilitate a return to pre-injury levels of function., Ext - RFL, Routine, 16 visits           Frequency / Duration: Patient will be seen for 2 x/week or a total of 10 visits over a 90 day period. Treatment will include: Manual Therapy, Neuromuscular Re-education, Therapeutic Activities, Therapeutic Exercise, and Home Exercise Program instruction, modalities prn for pain.    Education or treatment limitation: None  Rehab Potential:good    Patient/Family/Caregiver was advised of these findings, precautions, and treatment options and has agreed to actively participate in planning and for this course of care.    Thank you for your referral. Please co-sign or sign and return this letter via fax as soon as possible to [phone]. If you have any questions, please contact me at Dept: [phone]    Sincerely,  Electronically signed by therapist: Mateus Topete PT  Physician's certification required: Yes  I certify the need for these services furnished under this plan of treatment and while under my care.    X___________________________________________________ Date____________________    Certification From: 6/17/2024  To:9/15/2024

## 2024-06-21 ENCOUNTER — TELEPHONE (OUTPATIENT)
Dept: PHYSICAL THERAPY | Age: 74
End: 2024-06-21

## 2024-06-21 ENCOUNTER — APPOINTMENT (OUTPATIENT)
Dept: PHYSICAL THERAPY | Age: 74
End: 2024-06-21
Attending: FAMILY MEDICINE
Payer: MEDICARE

## 2024-06-27 ENCOUNTER — OFFICE VISIT (OUTPATIENT)
Dept: PHYSICAL THERAPY | Age: 74
End: 2024-06-27
Attending: FAMILY MEDICINE
Payer: MEDICARE

## 2024-06-27 ENCOUNTER — TELEPHONE (OUTPATIENT)
Dept: PHYSICAL THERAPY | Facility: HOSPITAL | Age: 74
End: 2024-06-27

## 2024-06-27 DIAGNOSIS — S42.291D OTHER CLOSED DISPLACED FRACTURE OF PROXIMAL END OF RIGHT HUMERUS WITH ROUTINE HEALING, SUBSEQUENT ENCOUNTER: Primary | ICD-10-CM

## 2024-06-27 PROCEDURE — 97140 MANUAL THERAPY 1/> REGIONS: CPT

## 2024-06-27 PROCEDURE — 97110 THERAPEUTIC EXERCISES: CPT

## 2024-06-29 NOTE — PROGRESS NOTES
Diagnosis:   Other closed displaced fracture of proximal end of right humerus with routine healing, subsequent encounter (P59.576N)      Referring Provider: Adolfo Drew  Date of Evaluation:    6/17/2024    Precautions:  Limit overhead activities and heavy lifting with the right arm. Next MD visit:   none scheduled  Date of Surgery: n/a   Insurance Primary/Secondary: [employer] [facility] / N/A     # Auth Visits: 8 ( Exp 8/17/2024)            Subjective: Per daughter,pt has been doing her home exercises.    Pain: 5/10 worse pain with movement      Objective:   Treatment /therapeutic exercises per flow sheet      Assessment: Pt is moving better and tolerating the pain better today      Goals: ( to be met in 10 visits)    Pt will report improved ability to sleep without waking due to shoulder pain   Pt will improve shoulder flexion AROM to >130 degrees to be able to reach into overhead cabinets without pain or restriction   Pt will improve shoulder abduction AROM to >120 degrees to improve ability to don deodorant, don/doff shirts, and wash hair   Pt will increase shoulder AROM ER to >60 to be able to reach and fasten seatbelt   Pt will increase shoulder AROM IR to >50 to be able to reach in back pocket, tuck in shirt, and turn steering wheel without pain  Pt will improve shoulder strength throughout to 4/5 to improve function with self care or household activities   Pt will demonstrate increased mid/low trap strength to >3/5 to promote improved shoulder mechanics and stabilization with lifting and reaching   Pt will be independent and compliant with comprehensive HEP to maintain progress achieved in PT         Plan: Continue with plan of care to work on improving functional range of motion .  Date: 6/27/2024  TX#: 2/10 Date:                 TX#: 3/ Date:                 TX#: 4/ Date:                 TX#: 5/ Date:   Tx#: 6/   Thera Ex:30'       Post manual Tx :  With #1 AAROM chest press 2x10  Flexion/OH 2x10  Abd/ER 2x10    Sidelying AAROM in abd, hor abd/add and shoudler ER 2x10       Manual Tx 12'       STM/IASTM to deltoids and  scapular muscles       HEP: Cane/pulley OH/flexion, scaption, abduction    Charges: Thera Ex x 2, Manual  Txx1       Total Timed Treatment: 42 min  Total Treatment Time: 42 min      SHOULDER EVALUATION:     Diagnosis:    Other closed displaced fracture of proximal end of right humerus with routine healing, subsequent encounter (O09.122W)          Referring Provider: Adolfo Derw DO Date of Evaluation:    6/17/2024    Precautions:    Limit overhead activities and heavy lifting with the right arm. Next MD visit:   none scheduled  Date of Surgery: n/a       PATIENT SUMMARY   Celeste Mann is a 73 year old female who presents to therapy today with complaints of R shoulder pain with Hx of fall sustaining a comminuted impacted fracture of the proximal humerus on May.She was then placed on arm sling for protection and was given pendulum exercises for home,then referred to PT.    Pt describes pain level  at worst 8/10, current 6/10, at best 5/10.  With movement /activities.Manages pain with tylenol/meloxicam  Pt denies BUE paresthesias.      Recent imaging 6/5/2024 with result stating  Findings consistent with healing of the known comminuted impacted fracture involving the proximal humeral neck extending to the greater tuberosity. Minimal callus formation noted at the fracture site. Acromioclavicular joint arthropathy present.        Current functional limitations include difficulty with self care activities  .   Past medical history was reviewed with Celeste. Significant findings include HTN.DM,visual impairment with glasses, non alcoholic fatty liver disease,nepropathy, Hx of fall     Celeste describes prior level of function not limited Pt goals include to improve use of RUE specially for self care activities         ASSESSMENT  Celeste presents to physical therapy evaluation with primary c/o R  shoulder pain and limited used due to pain , decreased range of motion and strength that she has difficulty doing self care activities.The patient injured her shoulder from a fall last month that resulted in proximal humeral fracture. She is currently . The results of the objective tests and measures show  AROM due to pain and weakness, difficulty performing functional activities needing some assistance.  These deficits contribute to functional limitations noted above in patient summary. Pt and PT discussed evaluation findings, pathology, POC and HEP.  Pt voiced understanding and performs HEP correctly without reported pain. Skilled Physical Therapy is medically necessary to address the above impairments and reach functional goals.     OBJECTIVE:   Observation/Posture: Guarded posture with R arm adducted and elbow flexed  Ambulates with cane for stability     Cervical AROM: (* denotes performed with pain)  Flexion: WFL  Extension: WFL  Sidebending: R WFL ,L WFL  Rotation: R WFL, L WFL    ROM: (* denotes performed with pain)  Shoulder (deg) Elbow (in neutral)   PROM Flexion: 120 deg *  PROM Abduction: 85 *deg   PROM ER: *NT  PROM IR: *NT     AROM (seated) shldr flex 25 deg, abd 30 deg,  Flexion: WNL     Ext  -20 deg * Active         Strength/MMT: (* denotes performed with pain)  Shoulder/elbow Scapular   Flexion: R 2-/5 *  Abduction: 2-/5 )  Elbow R 3/5 N/T B due to s/p (since unable to lay in prone)      Accessory motion: limited R GH jt, scapulothoracic joint  Palpation:  tenderness to palpation at AC joint and globally about the shoulder particularly about the greater tuberosity, no tenderness beyond mid arm     Flexibility: mod decreased on R  pec minor  Special tests:   N/T     FES Score  Score: 98.44 % (2024  3:25 PM)    Score and level of concern: 63 - high concern (2024  3:25 PM)          Today’s Treatment and Response:   Pt education was provided on exam findings, treatment diagnosis,  treatment plan, expectations, and prognosis. Pt was also provided recommendations for activity modifications, possible soreness after evaluation, modalities as needed [ice/heat], and postural corrections.She received PROM on all planes of movement to tolerated range.   Patient was instructed in and issued a HEP for: scapular retractions, table wipes,     Charges: PT Eval Moderate Complexity, Thera Ex x 1      Total Timed Treatment: 10 min     Total Treatment Time: 45 min     Based on clinical rationale and outcome measures, this evaluation involved Moderate Complexity decision making due to 3+ personal factors/comorbidities, 3 body structures involved/activity limitations, and evolving symptoms including changing pain levels and improved function .  PLAN OF CARE:    Goals: ( to be met in 10 visits)    Pt will report improved ability to sleep without waking due to shoulder pain   Pt will improve shoulder flexion AROM to >130 degrees to be able to reach into overhead cabinets without pain or restriction   Pt will improve shoulder abduction AROM to >120 degrees to improve ability to don deodorant, don/doff shirts, and wash hair   Pt will increase shoulder AROM ER to >60 to be able to reach and fasten seatbelt   Pt will increase shoulder AROM IR to >50 to be able to reach in back pocket, tuck in shirt, and turn steering wheel without pain  Pt will improve shoulder strength throughout to 4/5 to improve function with self care or household activities   Pt will demonstrate increased mid/low trap strength to >3/5 to promote improved shoulder mechanics and stabilization with lifting and reaching   Pt will be independent and compliant with comprehensive HEP to maintain progress achieved in PT       (Order summary from Ortho)  Recommend a PT protocol with an initial focus on pain management and reducing inflammation through the application of ice and gentle passive range of motion exercises to maintain joint mobility without  compromising the healing process. After the acute phase and based on the physician clearance, usually within the first few weeks post-injury, therapy progresses to include active range of motion exercises, gradually introducing strengthening exercises for the rotator cuff and scapular stabilizers using resistance bands or light weights, emphasizing careful monitoring of pain and avoiding movements that exacerbate symptoms. Functional exercises tailored to the patient's daily activities and demands are incorporated as the patient demonstrates improved strength and range of motion. Additionally, education on proper posture and ergonomics is provided to prevent future injuries and facilitate a return to pre-injury levels of function., Ext - RFL, Routine, 16 visits           Frequency / Duration: Patient will be seen for 2 x/week or a total of 10 visits over a 90 day period. Treatment will include: Manual Therapy, Neuromuscular Re-education, Therapeutic Activities, Therapeutic Exercise, and Home Exercise Program instruction, modalities prn for pain.    Education or treatment limitation: None  Rehab Potential:good    Patient/Family/Caregiver was advised of these findings, precautions, and treatment options and has agreed to actively participate in planning and for this course of care.    Thank you for your referral. Please co-sign or sign and return this letter via fax as soon as possible to [phone]. If you have any questions, please contact me at Dept: [phone]    Sincerely,  Electronically signed by therapist: Mateus Topete PT  Physician's certification required: Yes  I certify the need for these services furnished under this plan of treatment and while under my care.    X___________________________________________________ Date____________________    Certification From: 6/17/2024  To:9/15/2024

## 2024-07-03 ENCOUNTER — APPOINTMENT (OUTPATIENT)
Dept: PHYSICAL THERAPY | Age: 74
End: 2024-07-03
Attending: FAMILY MEDICINE
Payer: MEDICARE

## 2024-07-05 ENCOUNTER — APPOINTMENT (OUTPATIENT)
Dept: PHYSICAL THERAPY | Age: 74
End: 2024-07-05
Attending: FAMILY MEDICINE
Payer: MEDICARE

## 2024-07-11 ENCOUNTER — OFFICE VISIT (OUTPATIENT)
Dept: PHYSICAL THERAPY | Age: 74
End: 2024-07-11
Attending: FAMILY MEDICINE
Payer: MEDICARE

## 2024-07-11 DIAGNOSIS — S42.291D OTHER CLOSED DISPLACED FRACTURE OF PROXIMAL END OF RIGHT HUMERUS WITH ROUTINE HEALING, SUBSEQUENT ENCOUNTER: Primary | ICD-10-CM

## 2024-07-11 PROCEDURE — 97140 MANUAL THERAPY 1/> REGIONS: CPT

## 2024-07-11 PROCEDURE — 97110 THERAPEUTIC EXERCISES: CPT

## 2024-07-11 NOTE — PROGRESS NOTES
Diagnosis:   Other closed displaced fracture of proximal end of right humerus with routine healing, subsequent encounter (S48.471Z)      Referring Provider: Adolfo Drew  Date of Evaluation:    6/17/2024    Precautions:  Limit overhead activities and heavy lifting with the right arm. Next MD visit:   none scheduled  Date of Surgery: n/a   Insurance Primary/Secondary: [employer] [facility] / N/A     # Auth Visits: 8 ( Exp 8/17/2024)            Subjective: Per daughter,pt has been doing her home exercises.    Pain: 5-6/10 worse pain with movement      Objective:   Right Shoulder AROM  Flexion 120 deg  Abd 70  deg deg  ER post neck level  IR sacral level    Treatment /therapeutic exercises per flow sheet      Assessment: Pt is improving with function, able to do light household chores but nothing heavy.She reports compliance with HEP and only take medication for pain as needed.      Goals: ( to be met in 10 visits)    Pt will report improved ability to sleep without waking due to shoulder pain   Pt will improve shoulder flexion AROM to >130 degrees to be able to reach into overhead cabinets without pain or restriction   Pt will improve shoulder abduction AROM to >120 degrees to improve ability to don deodorant, don/doff shirts, and wash hair   Pt will increase shoulder AROM ER to >60 to be able to reach and fasten seatbelt   Pt will increase shoulder AROM IR to >50 to be able to reach in back pocket, tuck in shirt, and turn steering wheel without pain  Pt will improve shoulder strength throughout to 4/5 to improve function with self care or household activities   Pt will demonstrate increased mid/low trap strength to >3/5 to promote improved shoulder mechanics and stabilization with lifting and reaching   Pt will be independent and compliant with comprehensive HEP to maintain progress achieved in PT         Plan: Continue with plan of care to work on improving functional range of motion .  Date: 6/27/2024  TX#: 2/10  Date:7/11/2024               TX#: 3/10 Date:                 TX#: 4/ Date:                 TX#: 5/ Date:   Tx#: 6/   Thera Ex:30' Thera Ex:30'      Post manual Tx :  With #1 AAROM chest press 2x10  Flexion/OH 2x10  Abd/ER 2x10   Sidelying AAROM in abd, hor abd/add and shoudler ER 2x10 Post manual Tx :  Sidelying AAROM in abd, hor abd/add and shoudler ER 2x1  With #1 AAROM chest press 3x10  Flexion/OH 2x10  Abd/ER 2x10   OH pulley 2'       Manual Tx 12' Manual Tx 15'      STM/IASTM to deltoids and  scapular muscles STM/IASTM to deltoids and  scapular muscles  Scapular mobilization      HEP: Cane/pulley OH/flexion, scaption, abduction, table/wall wipes    Charges: Thera Ex x 2, Manual  Txx1       Total Timed Treatment: 42 min  Total Treatment Time: 42 min      SHOULDER EVALUATION:     Diagnosis:    Other closed displaced fracture of proximal end of right humerus with routine healing, subsequent encounter (S42.232I)          Referring Provider: Adolfo Drew DO Date of Evaluation:    6/17/2024    Precautions:    Limit overhead activities and heavy lifting with the right arm. Next MD visit:   none scheduled  Date of Surgery: n/a       PATIENT SUMMARY   Celeste Mann is a 73 year old female who presents to therapy today with complaints of R shoulder pain with Hx of fall sustaining a comminuted impacted fracture of the proximal humerus on May.She was then placed on arm sling for protection and was given pendulum exercises for home,then referred to PT.    Pt describes pain level  at worst 8/10, current 6/10, at best 5/10.  With movement /activities.Manages pain with tylenol/meloxicam  Pt denies BUE paresthesias.      Recent imaging 6/5/2024 with result stating  Findings consistent with healing of the known comminuted impacted fracture involving the proximal humeral neck extending to the greater tuberosity. Minimal callus formation noted at the fracture site. Acromioclavicular joint arthropathy present.        Current  functional limitations include difficulty with self care activities  .   Past medical history was reviewed with Celeste. Significant findings include HTN.DM,visual impairment with glasses, non alcoholic fatty liver disease,nepropathy, Hx of fall     Celeste describes prior level of function not limited Pt goals include to improve use of RUE specially for self care activities         ASSESSMENT  Celeste presents to physical therapy evaluation with primary c/o R shoulder pain and limited used due to pain , decreased range of motion and strength that she has difficulty doing self care activities.The patient injured her shoulder from a fall last month that resulted in proximal humeral fracture. She is currently . The results of the objective tests and measures show  AROM due to pain and weakness, difficulty performing functional activities needing some assistance.  These deficits contribute to functional limitations noted above in patient summary. Pt and PT discussed evaluation findings, pathology, POC and HEP.  Pt voiced understanding and performs HEP correctly without reported pain. Skilled Physical Therapy is medically necessary to address the above impairments and reach functional goals.     OBJECTIVE:   Observation/Posture: Guarded posture with R arm adducted and elbow flexed  Ambulates with cane for stability     Cervical AROM: (* denotes performed with pain)  Flexion: WFL  Extension: WFL  Sidebending: R WFL ,L WFL  Rotation: R WFL, L WFL    ROM: (* denotes performed with pain)  Shoulder (deg) Elbow (in neutral)   PROM Flexion: 120 deg *  PROM Abduction: 85 *deg   PROM ER: *NT  PROM IR: *NT     AROM (seated) shldr flex 25 deg, abd 30 deg,  Flexion: WNL     Ext  -20 deg * Active         Strength/MMT: (* denotes performed with pain)  Shoulder/elbow Scapular   Flexion: R 2-/5 *  Abduction: 2-/5 )  Elbow R 3/5 N/T B due to s/p (since unable to lay in prone)      Accessory motion: limited R GH jt,  scapulothoracic joint  Palpation:  tenderness to palpation at AC joint and globally about the shoulder particularly about the greater tuberosity, no tenderness beyond mid arm     Flexibility: mod decreased on R  pec minor  Special tests:   N/T     FES Score  Score: 98.44 % (6/17/2024  3:25 PM)    Score and level of concern: 63 - high concern (6/17/2024  3:25 PM)          Today’s Treatment and Response:   Pt education was provided on exam findings, treatment diagnosis, treatment plan, expectations, and prognosis. Pt was also provided recommendations for activity modifications, possible soreness after evaluation, modalities as needed [ice/heat], and postural corrections.She received PROM on all planes of movement to tolerated range.   Patient was instructed in and issued a HEP for: scapular retractions, table wipes,     Charges: PT Eval Moderate Complexity, Thera Ex x 1      Total Timed Treatment: 10 min     Total Treatment Time: 45 min     Based on clinical rationale and outcome measures, this evaluation involved Moderate Complexity decision making due to 3+ personal factors/comorbidities, 3 body structures involved/activity limitations, and evolving symptoms including changing pain levels and improved function .  PLAN OF CARE:    Goals: ( to be met in 10 visits)    Pt will report improved ability to sleep without waking due to shoulder pain   Pt will improve shoulder flexion AROM to >130 degrees to be able to reach into overhead cabinets without pain or restriction   Pt will improve shoulder abduction AROM to >120 degrees to improve ability to don deodorant, don/doff shirts, and wash hair   Pt will increase shoulder AROM ER to >60 to be able to reach and fasten seatbelt   Pt will increase shoulder AROM IR to >50 to be able to reach in back pocket, tuck in shirt, and turn steering wheel without pain  Pt will improve shoulder strength throughout to 4/5 to improve function with self care or household activities   Pt  will demonstrate increased mid/low trap strength to >3/5 to promote improved shoulder mechanics and stabilization with lifting and reaching   Pt will be independent and compliant with comprehensive HEP to maintain progress achieved in PT       (Order summary from Ortho)  Recommend a PT protocol with an initial focus on pain management and reducing inflammation through the application of ice and gentle passive range of motion exercises to maintain joint mobility without compromising the healing process. After the acute phase and based on the physician clearance, usually within the first few weeks post-injury, therapy progresses to include active range of motion exercises, gradually introducing strengthening exercises for the rotator cuff and scapular stabilizers using resistance bands or light weights, emphasizing careful monitoring of pain and avoiding movements that exacerbate symptoms. Functional exercises tailored to the patient's daily activities and demands are incorporated as the patient demonstrates improved strength and range of motion. Additionally, education on proper posture and ergonomics is provided to prevent future injuries and facilitate a return to pre-injury levels of function., Ext - RFL, Routine, 16 visits           Frequency / Duration: Patient will be seen for 2 x/week or a total of 10 visits over a 90 day period. Treatment will include: Manual Therapy, Neuromuscular Re-education, Therapeutic Activities, Therapeutic Exercise, and Home Exercise Program instruction, modalities prn for pain.    Education or treatment limitation: None  Rehab Potential:good    Patient/Family/Caregiver was advised of these findings, precautions, and treatment options and has agreed to actively participate in planning and for this course of care.    Thank you for your referral. Please co-sign or sign and return this letter via fax as soon as possible to [phone]. If you have any questions, please contact me at  Dept: [phone]    Sincerely,  Electronically signed by therapist: Mateus Topete PT  Physician's certification required: Yes  I certify the need for these services furnished under this plan of treatment and while under my care.    X___________________________________________________ Date____________________    Certification From: 6/17/2024  To:9/15/2024

## 2024-07-15 ENCOUNTER — HOSPITAL ENCOUNTER (OUTPATIENT)
Dept: GENERAL RADIOLOGY | Age: 74
Discharge: HOME | End: 2024-07-15
Attending: FAMILY MEDICINE
Payer: MEDICARE

## 2024-07-15 ENCOUNTER — OFFICE VISIT (OUTPATIENT)
Facility: CLINIC | Age: 74
End: 2024-07-15
Payer: MEDICARE

## 2024-07-15 VITALS — BODY MASS INDEX: 30.21 KG/M2 | HEIGHT: 61 IN | WEIGHT: 160 LBS

## 2024-07-15 DIAGNOSIS — Z09 FRACTURE (HEALED) TREATMENT FOLLOW-UP: ICD-10-CM

## 2024-07-15 DIAGNOSIS — S42.291D OTHER CLOSED DISPLACED FRACTURE OF PROXIMAL END OF RIGHT HUMERUS WITH ROUTINE HEALING, SUBSEQUENT ENCOUNTER: Primary | ICD-10-CM

## 2024-07-15 PROCEDURE — 73060 X-RAY EXAM OF HUMERUS: CPT | Performed by: FAMILY MEDICINE

## 2024-07-15 PROCEDURE — 99214 OFFICE O/P EST MOD 30 MIN: CPT | Performed by: FAMILY MEDICINE

## 2024-07-15 PROCEDURE — 1125F AMNT PAIN NOTED PAIN PRSNT: CPT | Performed by: FAMILY MEDICINE

## 2024-07-15 PROCEDURE — 3008F BODY MASS INDEX DOCD: CPT | Performed by: FAMILY MEDICINE

## 2024-07-15 PROCEDURE — 1160F RVW MEDS BY RX/DR IN RCRD: CPT | Performed by: FAMILY MEDICINE

## 2024-07-15 PROCEDURE — 1159F MED LIST DOCD IN RCRD: CPT | Performed by: FAMILY MEDICINE

## 2024-07-15 NOTE — PROGRESS NOTES
Sports Medicine Clinic Note    Subjective:    Chief Complaint: Right shoulder fracture follow-up.    Date of Injury: 5/10/24    Interval History: This 73-year-old right-hand dominant female returns for a follow-up evaluation of her right shoulder after sustaining a comminuted impacted fracture of the proximal humerus. The patient reports noticeable improvement in pain and mobility since the last visit. She has been diligently performing physical therapy exercises, including passive and active range of motion, as well as gentle strengthening exercises. She no longer needs to use a sling for comfort. She is currently taking Meloxicam and Tylenol for pain management, with minimal need for additional pain relief. The patient denies any new symptoms such as numbness, tingling, or increased pain. She reports an overall improvement in her ability to perform daily activities with less discomfort.    Objective:    Right Shoulder Examination:    Inspection:  - No gross deformity  - Residual mild swelling noted  - No open wounds or drainage  - No evidence of muscle atrophy    Palpation:  - Mild tenderness to palpation over the AC joint and greater tuberosity  - No tenderness beyond the mid-arm    Range of Motion:  - Forward flexion: 0-120 degrees  - Abduction: 0-90 degrees  - External rotation: 0-30 degrees  - Internal rotation: able to reach the lumbar spine    Neurovascular:  - Sensation intact in axillary, radial, ulnar, median, and musculocutaneous nerve distributions  - Fires biceps, triceps, and deltoid muscles  - 2+ radial pulse, brisk capillary refill    Diagnostic Studies:    - Follow-up radiographs of the right humerus were personally reviewed. The imaging shows progressive healing of the comminuted impacted fracture involving the proximal humerus, with increased callus formation and stable alignment. The acromioclavicular joint arthropathy remains unchanged.    Assessment:    73-year-old female with clinical history  and examination consistent with ongoing healing of a comminuted impacted fracture of the proximal end of the right humerus, demonstrating improvement in pain and function.    Plan:    Additional imaging/workup: No additional imaging needed at this time as healing is progressing satisfactorily.    Therapy: Continue formal physical therapy with an emphasis on advancing range of motion and strengthening exercises. Encourage continued home exercises, including pendulum and gentle strengthening activities.    Medications: Continue Meloxicam and Tylenol as needed for pain. Flexeril can be continued for sleep as needed.    Procedures: None indicated at this time.    Activity Recommendations: Gradually increase activities as tolerated, avoiding heavy lifting and overhead activities with the right arm. Encourage gentle exercises to maintain and improve mobility without exacerbating pain.    Follow-Up: Schedule follow-up appointment in 4-6 weeks to reassess clinical progress. No need for repeat x-rays. Advise the patient to return earlier if symptoms worsen or new symptoms develop.      Adolfo Drew DO, CAQSM  Primary Care Sports Medicine    Department of Orthopaedic Surgery  [facility]    [address] [place] [id number]  [address] [place] [address]    t: [phone]  f: [phone]      [facility], [place]

## 2024-07-22 DIAGNOSIS — E11.59 HYPERTENSION ASSOCIATED WITH DIABETES (HCC): ICD-10-CM

## 2024-07-22 DIAGNOSIS — E11.65 UNCONTROLLED TYPE 2 DIABETES MELLITUS WITH HYPERGLYCEMIA (HCC): ICD-10-CM

## 2024-07-22 DIAGNOSIS — I15.2 HYPERTENSION ASSOCIATED WITH DIABETES (HCC): ICD-10-CM

## 2024-07-22 RX ORDER — GLIMEPIRIDE 4 MG/1
8 TABLET ORAL
Qty: 180 TABLET | Refills: 0 | Status: SHIPPED | OUTPATIENT
Start: 2024-07-22

## 2024-07-22 RX ORDER — HYDROCHLOROTHIAZIDE 25 MG/1
25 TABLET ORAL DAILY
Qty: 90 TABLET | Refills: 0 | Status: SHIPPED | OUTPATIENT
Start: 2024-07-22

## 2024-08-09 ENCOUNTER — LAB ENCOUNTER (OUTPATIENT)
Dept: LAB | Age: 74
End: 2024-08-09
Attending: INTERNAL MEDICINE
Payer: MEDICARE

## 2024-08-09 ENCOUNTER — TELEPHONE (OUTPATIENT)
Dept: INTERNAL MEDICINE CLINIC | Facility: CLINIC | Age: 74
End: 2024-08-09

## 2024-08-09 ENCOUNTER — OFFICE VISIT (OUTPATIENT)
Dept: INTERNAL MEDICINE CLINIC | Facility: CLINIC | Age: 74
End: 2024-08-09
Payer: MEDICARE

## 2024-08-09 VITALS
BODY MASS INDEX: 28.47 KG/M2 | SYSTOLIC BLOOD PRESSURE: 154 MMHG | OXYGEN SATURATION: 98 % | DIASTOLIC BLOOD PRESSURE: 60 MMHG | WEIGHT: 150.81 LBS | RESPIRATION RATE: 18 BRPM | TEMPERATURE: 98 F | HEIGHT: 61 IN | HEART RATE: 85 BPM

## 2024-08-09 DIAGNOSIS — E11.69 HYPERLIPIDEMIA ASSOCIATED WITH TYPE 2 DIABETES MELLITUS (HCC): ICD-10-CM

## 2024-08-09 DIAGNOSIS — E11.65 UNCONTROLLED TYPE 2 DIABETES MELLITUS WITH HYPERGLYCEMIA (HCC): ICD-10-CM

## 2024-08-09 DIAGNOSIS — S42.301D CLOSED FRACTURE OF RIGHT UPPER EXTREMITY WITH ROUTINE HEALING, SUBSEQUENT ENCOUNTER: ICD-10-CM

## 2024-08-09 DIAGNOSIS — R80.9 DIABETES MELLITUS WITH ALBUMINURIA (HCC): ICD-10-CM

## 2024-08-09 DIAGNOSIS — M80.00XD AGE-RELATED OSTEOPOROSIS WITH CURRENT PATHOLOGICAL FRACTURE WITH ROUTINE HEALING: ICD-10-CM

## 2024-08-09 DIAGNOSIS — E78.5 HYPERLIPIDEMIA ASSOCIATED WITH TYPE 2 DIABETES MELLITUS (HCC): ICD-10-CM

## 2024-08-09 DIAGNOSIS — E11.59 HYPERTENSION ASSOCIATED WITH DIABETES (HCC): ICD-10-CM

## 2024-08-09 DIAGNOSIS — E11.29 DIABETES MELLITUS WITH ALBUMINURIA (HCC): ICD-10-CM

## 2024-08-09 DIAGNOSIS — Z79.4 INSULIN DEPENDENT TYPE 2 DIABETES MELLITUS (HCC): ICD-10-CM

## 2024-08-09 DIAGNOSIS — R79.89 HIGH SERUM VITAMIN D: ICD-10-CM

## 2024-08-09 DIAGNOSIS — R80.9 DIABETES MELLITUS WITH ALBUMINURIA (HCC): Primary | ICD-10-CM

## 2024-08-09 DIAGNOSIS — E11.29 DIABETES MELLITUS WITH ALBUMINURIA (HCC): Primary | ICD-10-CM

## 2024-08-09 DIAGNOSIS — I15.2 HYPERTENSION ASSOCIATED WITH DIABETES (HCC): ICD-10-CM

## 2024-08-09 DIAGNOSIS — E11.9 INSULIN DEPENDENT TYPE 2 DIABETES MELLITUS (HCC): ICD-10-CM

## 2024-08-09 PROBLEM — E66.9 DIABETES MELLITUS TYPE 2 IN OBESE: Status: RESOLVED | Noted: 2023-05-01 | Resolved: 2024-08-09

## 2024-08-09 PROBLEM — Z00.00 ROUTINE GENERAL MEDICAL EXAMINATION AT A HEALTH CARE FACILITY: Chronic | Status: RESOLVED | Noted: 2024-02-05 | Resolved: 2024-08-09

## 2024-08-09 PROBLEM — S42.301A CLOSED FRACTURE OF RIGHT UPPER LIMB: Status: ACTIVE | Noted: 2024-08-09

## 2024-08-09 LAB
ALT SERPL-CCNC: 23 U/L
ANION GAP SERPL CALC-SCNC: 9 MMOL/L (ref 0–18)
AST SERPL-CCNC: 35 U/L (ref ?–34)
BUN BLD-MCNC: 16 MG/DL (ref 9–23)
CALCIUM BLD-MCNC: 10.3 MG/DL (ref 8.7–10.4)
CHLORIDE SERPL-SCNC: 104 MMOL/L (ref 98–112)
CO2 SERPL-SCNC: 20 MMOL/L (ref 21–32)
CREAT BLD-MCNC: 1.01 MG/DL
EGFRCR SERPLBLD CKD-EPI 2021: 59 ML/MIN/1.73M2 (ref 60–?)
EST. AVERAGE GLUCOSE BLD GHB EST-MCNC: 235 MG/DL (ref 68–126)
FASTING STATUS PATIENT QL REPORTED: YES
GLUCOSE BLD-MCNC: 284 MG/DL (ref 70–99)
HBA1C MFR BLD: 9.8 % (ref ?–5.7)
HEMOGLOBIN A1C: 9.5 % (ref 4.3–5.6)
OSMOLALITY SERPL CALC.SUM OF ELEC: 287 MOSM/KG (ref 275–295)
POTASSIUM SERPL-SCNC: 3.8 MMOL/L (ref 3.5–5.1)
SODIUM SERPL-SCNC: 133 MMOL/L (ref 136–145)
VIT D+METAB SERPL-MCNC: 22.3 NG/ML (ref 30–100)

## 2024-08-09 PROCEDURE — 84460 ALANINE AMINO (ALT) (SGPT): CPT

## 2024-08-09 PROCEDURE — 82306 VITAMIN D 25 HYDROXY: CPT

## 2024-08-09 PROCEDURE — 83036 HEMOGLOBIN GLYCOSYLATED A1C: CPT

## 2024-08-09 PROCEDURE — 36415 COLL VENOUS BLD VENIPUNCTURE: CPT

## 2024-08-09 PROCEDURE — 84450 TRANSFERASE (AST) (SGOT): CPT

## 2024-08-09 PROCEDURE — 80048 BASIC METABOLIC PNL TOTAL CA: CPT

## 2024-08-09 RX ORDER — ATORVASTATIN CALCIUM 80 MG/1
80 TABLET, FILM COATED ORAL DAILY
Qty: 90 TABLET | Refills: 3 | Status: SHIPPED | OUTPATIENT
Start: 2024-08-09

## 2024-08-09 RX ORDER — ALENDRONATE SODIUM 70 MG/1
70 TABLET ORAL
Qty: 12 TABLET | Refills: 3 | Status: SHIPPED | OUTPATIENT
Start: 2024-08-09

## 2024-08-09 RX ORDER — ACETAMINOPHEN 160 MG
2000 TABLET,DISINTEGRATING ORAL DAILY
COMMUNITY

## 2024-08-09 RX ORDER — HYDRALAZINE HYDROCHLORIDE 50 MG/1
50 TABLET, FILM COATED ORAL 2 TIMES DAILY
Qty: 180 TABLET | Refills: 1 | Status: SHIPPED | OUTPATIENT
Start: 2024-08-09

## 2024-08-09 RX ORDER — MELOXICAM 15 MG/1
15 TABLET ORAL DAILY PRN
COMMUNITY
End: 2024-08-09

## 2024-08-09 RX ORDER — LOSARTAN POTASSIUM 100 MG/1
100 TABLET ORAL EVERY EVENING
Qty: 90 TABLET | Refills: 3 | Status: SHIPPED | OUTPATIENT
Start: 2024-08-09

## 2024-08-09 RX ORDER — GLIMEPIRIDE 4 MG/1
8 TABLET ORAL
Qty: 180 TABLET | Refills: 3 | Status: SHIPPED | OUTPATIENT
Start: 2024-08-09

## 2024-08-09 RX ORDER — MELOXICAM 15 MG/1
15 TABLET ORAL DAILY PRN
Qty: 30 TABLET | Refills: 1 | Status: SHIPPED | OUTPATIENT
Start: 2024-08-09

## 2024-08-09 NOTE — PROGRESS NOTES
Celeste Mann is a 74 year old female.     HPI:   Patient presents for the following issues.  ID [id number], Sri.   Vitamin D Deficiency - taking supplement.   IDDM - A1C is 9.5 today. - she states she is very adherent with her insulin and medications. She believes she is eating poorly.   Proximal humerus fracture , osteopenia - she is no longer attending physical therapy because no-one can drive her.  It is healing very well per ortho. She continues to have mild pain, worse at night. She uses biofreeze. She last took meloxicam and flexeril was 7/15/2024. She denies side effects from the medications. Never has hypoglycemia.   DM eye exam - had it done with Evelyn Thornton last month.   HTN - home pressures are high.       REVIEW OF SYSTEMS:   GENERAL HEALTH: feels well otherwise. No f/c  NEURO: denies any headaches, LH, LOC. She tripped recently and sustained fracture. Often has dizziness.   VISION: denies any  or double vision. Occ blurry visions  RESPIRATORY: denies shortness of breath, cough, or congestion  CARDIOVASCULAR: denies chest pain, pressure or palpitations  GI: denies abdominal pain, , diarrhea, n/v, BRBPR, melena. Often has constipation.   : no dysuria or hematuria  PSYCH: mood is stable  EXT: denies edema       Wt Readings from Last 6 Encounters:   08/09/24 150 lb 12.8 oz (68.4 kg)   07/15/24 160 lb (72.6 kg)   06/05/24 160 lb (72.6 kg)   05/15/24 160 lb (72.6 kg)   05/10/24 160 lb (72.6 kg)   02/05/24 153 lb (69.4 kg)       Allergies   Allergen Reactions    Penicillins RASH    Jardiance [Empagliflozin] ITCHING     Vaginal itching       Family History   Problem Relation Age of Onset    Cancer Father         stomach cancer    Diabetes Other     Diabetes Son     Diabetes Sister     Diabetes Brother       Past Medical History:    Essential hypertension    High blood pressure    High cholesterol    NAFLD (nonalcoholic fatty liver disease)    Cirrhosis, Grade 0-1 varices    Nephropathy    Type  II or unspecified type diabetes mellitus without mention of complication, not stated as uncontrolled    Visual impairment    glasses      Past Surgical History:   Procedure Laterality Date    Cholecystectomy      Colonoscopy  9/9/22= Normal    Repeat PRN    Colonoscopy N/A 9/9/2022    Procedure: COLONOSCOPY;  Surgeon: Uche Oneill MD;  Location:  ENDOSCOPY    Hysterectomy  1998    Other surgical history Right     right knee repair but not a replacement    Upper gi endoscopy performed  9/9/22= Grade 0-1 varices.  Gastric Bx:      Social History:    Social History     Socioeconomic History    Marital status:    Tobacco Use    Smoking status: Never    Smokeless tobacco: Never   Vaping Use    Vaping status: Never Used   Substance and Sexual Activity    Alcohol use: Not Currently    Drug use: No   Other Topics Concern    Caffeine Concern Yes    Exercise Yes     Comment: 1-2x per week     Social Determinants of Health     Food Insecurity: No Food Insecurity (6/19/2022)    Received from [facility]    Food Insecurity     Within the past 12 months, you worried that your food would run out before you got the money to buy more.: 1     Within the past 12 months, the food you bought just didn't last and you didn't have money to get more.: 1   Transportation Needs: No Transportation Needs (6/19/2022)    Received from [facility]    Transportation Needs     In the past 12 months, has lack of transportation kept you from medical appointments or from getting medications?: 2     In the past 12 months, has lack of transportation kept you from meetings, work, or from getting things needed for daily living?: 2    Received from [facility] Social Needs Screening - Social Connection    Received from [facility]    [facility] Housing           EXAM:   /60 (BP Location: Left arm)   Pulse 85   Temp 98 °F (36.7 °C) (Temporal)   Resp 18   Ht 5' 1\" (1.549 m)   Wt 150  lb 12.8 oz (68.4 kg)   SpO2 98%   BMI 28.49 kg/m²   GENERAL: A&O well developed, well nourished in mild pain when moving her right arm.   SKIN: no rashes,no suspicious lesions  HEENT: atraumatic, MMM, throat is clear  NECK: supple, no jvd, no thyromegaly  LUNGS: clear to auscultation bilateraly, no c/w/r  CARDIO: RRR without g/m/r  GI: soft non tender nondistended no hsm bs throughout  NEURO: CN 2-12 grossly intact  PSYCH: pleasant  EXTREMITIES: no cyanosis, clubbing or edema    ASSESSMENT AND PLAN:   # Medication non-adherence - patient claims she is taking her medications routinely but refill history does not match this.   # Osteoporosis with fracture: she now meets criteria for treatment. We discussed alendronate and she is willing to start.   - educated on dietary calcium/vit D3 weight bearing exercises   # Vitamin D Deficiency -  continue supplement.   # Uncontrolled Insulin Dependent Diabetes: uncontrolled in 8/2024. Again Referred to DM team. She needs CGM.   - Challenging case and patient is very non-adherent. Very adamantly against increasing insulin.  Continue toujeo, metformin and glimeperide.   - extensively counseled on health plant based nutrition  - DM eye exam on 9/6/2022 by Hayes Thornton - proliferative diabetic retinopathy with macular edema of right eye. Has proliferative diabetic retinopathy of left eye without macular edema. We have requested more recent eye exam from Dr. Thornton.   - Foot Exam: done 2024   - LDL: see below   - BP: see below  - micro alb:creat - done 2024  - Depression Screen: done 2024  - not on ASA  # Albuminuria in Type 2 DM: cont ARB and needs to improve glycemic control.   # Proliferative diabetic retinopathy and clinically significant macular edema b/l: following with ophtho, needs improved glycemic control  # HTN in T2 DM: uncontrolled. She needs to increase her losartan to 100 mg and cont hydralazine and hydrochlorothiazide.   # HLP assoc with DM: cont atorvastatin  80 mg  # cirrhosis of liver: detected by liver US in 2014.  Weight loss is paramount. LFTs are normal. Could not afford victoza.  # Overweight: applauded on weight loss. Counseled on healthy plant based nutrition, regular exercise, and practicing mindfulness. We outlined small, achievable goals.   # Health Maintenance: medicare supervisit on 2/5/2024   Colon Cancer Screening: normal colonoscopy on 9/9/2022 by Uche Nino. Repeat in 10 years pending her health.   Cervical Cancer Screening: s/p [facility]  Breast Cancer Screening: continue yearly mammogram.   Bone Health/Fall Prevention (Tim Chi): see above   Vaccines: up to date with prevnar 13 and [id number]. Educated on shingrix and TDAP., RSV, flu and COVID   Hep C screening (born [id number]): Ab neg 2019  Medical POA: daughter, Carmen Mann is primary        Previous PCP - Jessica GOULD - Keagan Gavin      I spent 42 minutes with patient.     The patient indicates understanding of these issues and agrees to the plan.  The patient is asked to return in 2/2025 for MAS.   Marga Ferreira MD

## 2024-08-09 NOTE — PATIENT INSTRUCTIONS
No exceda los 3000 mg (3 gramos) de tylenol en 24 horas.   Es muy importante que observe la cantidad de tylenol (acetaminofén) en cualquiera de jesus medicamentos de venta cosme para asegurarse de no exceder shamir cantidad romero de tylenol por día.    Puede usar meloxicam 15 mg todos los días según sea necesario para el dolor. Es posible que necesite seguir tomando medicamentos para el dolor pal varios meses hasta que el brazo y el dolor sanen.     También puede utilizar los siguientes agentes tópicos para el dolor. Son de venta cosme:  1. Lidocaína tópica (parches de lidoderm). Úsalo pal 12 horas y apagado pal 12 horas.  2. El gel de diclofenaco tópico se puede utilizar varias veces al día.      En Ing[place]  Please do not exceed 3000 mg (3 grams) of tylenol in 24 hours.   It is very important you look at the amount of tylenol (acetaminophen) in any of your over the counter medications to ensure you do not exceed a safe amount of tylenol per day.    You can use meloxicam 15 mg every day as needed for pain. You may need to continue taking medication for pain for several months until your arm and pain heals.     You can also use the following topical agents for your pain. They are over the counter:  1. Topical lidocaine (lidoderm patches). Wear for 12 hours and off for 12 hours  2. Topical diclofenac gel can be used several times/day

## 2024-08-09 NOTE — TELEPHONE ENCOUNTER
Called [facility] to obtain patients recent DM exam report     Awaiting fax

## 2024-08-12 ENCOUNTER — TELEPHONE (OUTPATIENT)
Dept: INTERNAL MEDICINE CLINIC | Facility: CLINIC | Age: 74
End: 2024-08-12

## 2024-08-12 NOTE — TELEPHONE ENCOUNTER
Betty  [facility] [phone]     FYI     Received hh and going to start physical therapy services on 08/14/24

## 2024-09-25 ENCOUNTER — TELEPHONE (OUTPATIENT)
Dept: INTERNAL MEDICINE CLINIC | Facility: CLINIC | Age: 74
End: 2024-09-25

## 2024-09-25 NOTE — TELEPHONE ENCOUNTER
Ros  [facility]  [phone]    FYI    Pt being discharged from  services per pt request. Shoulder flexion is now 115 degrees.

## 2024-10-14 ENCOUNTER — TELEPHONE (OUTPATIENT)
Dept: ORTHOPEDICS CLINIC | Facility: CLINIC | Age: 74
End: 2024-10-14

## 2024-10-14 DIAGNOSIS — M25.511 RIGHT SHOULDER PAIN, UNSPECIFIED CHRONICITY: Primary | ICD-10-CM

## 2024-10-14 NOTE — TELEPHONE ENCOUNTER
LOV- 07/15/2024- Other closed displaced fracture of proximal end of right humerus with routine healing, subsequent encounter     Spoke with jaleel coronado per HIPAA. Patient fell on Saturday and has pain in shoulder where the original break was back in May. Johanne states that the shoulder is not swollen, however she c/o pain and having trouble moving her arm    She doesn't want to go to the ER. She doesn't want to go to the [facility]. She wants to see . Advised that we would send request to MD and see what we can offer if anything?

## 2024-10-14 NOTE — TELEPHONE ENCOUNTER
Est patient - Nichina Whiting called to request an urgent appt for her aunt who re-injured her right arm over the weekend. Johanne states her aunt has eye surgery planned for Thursday and would like her to be seen before that day if possible.

## 2024-10-15 NOTE — TELEPHONE ENCOUNTER
Patient's niece called in. Offered times for Wednesday but patient's niece declined and stated that patient has surgery on Wednesday.    Patient's niece is requesting if there's any way patient can be seen today.    Please advise.

## 2024-10-17 DIAGNOSIS — E11.59 HYPERTENSION ASSOCIATED WITH DIABETES (HCC): ICD-10-CM

## 2024-10-17 DIAGNOSIS — E11.65 UNCONTROLLED TYPE 2 DIABETES MELLITUS WITH HYPERGLYCEMIA (HCC): ICD-10-CM

## 2024-10-17 DIAGNOSIS — I15.2 HYPERTENSION ASSOCIATED WITH DIABETES (HCC): ICD-10-CM

## 2024-10-17 RX ORDER — HYDROCHLOROTHIAZIDE 25 MG/1
25 TABLET ORAL DAILY
Qty: 90 TABLET | Refills: 0 | Status: SHIPPED | OUTPATIENT
Start: 2024-10-17

## 2024-10-17 NOTE — TELEPHONE ENCOUNTER
Name from pharmacy: hydroCHLOROthiazide 25 MG Oral Tablet          Will file in chart as: HYDROCHLOROTHIAZIDE 25 MG Oral Tab    Sig: Take 1 tablet by mouth once daily    Disp: 90 tablet    Refills: 0    Start: 10/17/2024    Class: Normal    Non-formulary For: Uncontrolled type 2 diabetes mellitus with hyperglycemia (HCC), Hypertension associated with diabetes (HCC)    Last ordered: 2 months ago (7/22/2024) by Marga Ferreira MD    Last refill: 7/22/2024    Rx #: [id number]    Hypertension Medications Protocol Zxfuqd74/17/2024 10:14 AM   Protocol Details Last BP reading less than 140/90    CMP or BMP in past 12 months    In person appointment or virtual visit in the past 12 mos or appointment in next 3 mos    EGFRCR or GFRNAA > 50      To be filled at: [address] [phone]     LOV: 08/09/2024  RTC:2/2025  Labs:08/09/2024  Last filled:07/22/2024  No future appointments.     n/a

## 2024-11-01 NOTE — TELEPHONE ENCOUNTER
Patient geoff called again to set up an appt. Scheduled on 11/13. Should patient be seen sooner?     Future Appointments   Date Time Provider Department Center   11/13/2024 10:00 AM Adolfo Drew DO [facility] [address]

## 2024-11-01 NOTE — TELEPHONE ENCOUNTER
Re injury was greater than 2 weeks ago.  Patient was offered an appointment earlier but they refused due to scheduling.  Next available is fine.

## 2024-11-04 RX ORDER — INSULIN GLARGINE 300 U/ML
INJECTION, SOLUTION SUBCUTANEOUS
Qty: 12 ML | Refills: 0 | Status: SHIPPED | OUTPATIENT
Start: 2024-11-04

## 2024-11-04 NOTE — TELEPHONE ENCOUNTER
Name from pharmacy: Toujeo SoloStar 300 UNIT/ML Subcutaneous Solution Pen-injector          Will file in chart as: TOUJEO SOLOSTAR 300 UNIT/ML Subcutaneous Solution Pen-injector    Sig: INJECT 40 UNITS SUBCUTANEOUSLY IN THE MORNING AND 42 IN THE EVENING    Disp: 12 mL    Refills: 0    Start: 11/4/2024    Class: Normal    Non-formulary    Last ordered: 7 months ago (4/4/2024) by Marga Ferreira MD    Last refill: 9/8/2024    Rx #: [id number]    Diabetes Medication Protocol Tpplmc9911/04/2024 10:29 AM   Protocol Details Last A1C < 7.5 and within past 6 months    In person appointment or virtual visit in the past 6 mos or appointment in next 3 mos    Microalbumin procedure in past 12 months or taking ACE/ARB    EGFRCR or GFRNAA > 50    GFR in the past 12 months      To be filled at: [facility] [address] [place] [address] [phone], [phone]     LOV:08/09/2024  RTC: 6 months   Labs: 08/09/2024  Last filled: 09/08/2024  Future Appointments   Date Time Provider Department Center   11/13/2024  2:20 PM Adolfo Drew DO [facility] [address]

## 2024-11-13 ENCOUNTER — OFFICE VISIT (OUTPATIENT)
Facility: CLINIC | Age: 74
End: 2024-11-13
Payer: MEDICARE

## 2024-11-13 ENCOUNTER — TELEPHONE (OUTPATIENT)
Facility: CLINIC | Age: 74
End: 2024-11-13

## 2024-11-13 ENCOUNTER — HOSPITAL ENCOUNTER (OUTPATIENT)
Dept: GENERAL RADIOLOGY | Age: 74
Discharge: HOME | End: 2024-11-13
Attending: FAMILY MEDICINE
Payer: MEDICARE

## 2024-11-13 VITALS — HEIGHT: 61 IN | WEIGHT: 150 LBS | BODY MASS INDEX: 28.32 KG/M2

## 2024-11-13 DIAGNOSIS — M25.511 ACUTE PAIN OF RIGHT SHOULDER: ICD-10-CM

## 2024-11-13 DIAGNOSIS — M19.011 PRIMARY OSTEOARTHRITIS OF RIGHT SHOULDER: ICD-10-CM

## 2024-11-13 DIAGNOSIS — Z09 FRACTURE (HEALED) TREATMENT FOLLOW-UP: ICD-10-CM

## 2024-11-13 DIAGNOSIS — M75.101 ROTATOR CUFF SYNDROME OF RIGHT SHOULDER: Primary | ICD-10-CM

## 2024-11-13 DIAGNOSIS — M25.511 ACUTE PAIN OF RIGHT SHOULDER: Primary | ICD-10-CM

## 2024-11-13 PROCEDURE — 1159F MED LIST DOCD IN RCRD: CPT | Performed by: FAMILY MEDICINE

## 2024-11-13 PROCEDURE — 73030 X-RAY EXAM OF SHOULDER: CPT | Performed by: FAMILY MEDICINE

## 2024-11-13 PROCEDURE — 1160F RVW MEDS BY RX/DR IN RCRD: CPT | Performed by: FAMILY MEDICINE

## 2024-11-13 PROCEDURE — 3008F BODY MASS INDEX DOCD: CPT | Performed by: FAMILY MEDICINE

## 2024-11-13 PROCEDURE — 1125F AMNT PAIN NOTED PAIN PRSNT: CPT | Performed by: FAMILY MEDICINE

## 2024-11-13 PROCEDURE — 99214 OFFICE O/P EST MOD 30 MIN: CPT | Performed by: FAMILY MEDICINE

## 2024-11-13 NOTE — TELEPHONE ENCOUNTER
Xray ordered per Ortho protocol, Xray scheduled.  SyncSum message sent to patient to arrive 15 - 20 min early to complete imaging.

## 2024-11-14 NOTE — H&P
Sports Medicine Clinic Note    Subjective:    Chief Complaint: Persistent right shoulder pain following a recent fall.    Date of Injury: Approximately one month ago.    History: This is a very pleasant 73-year-old right-hand dominant female returns following a mechanical fall onto her right shoulder outside her home. She is well known to me for a now healed proximal humerus fracture in the same shoulder within the past year. Since the fall, she reports a gradual improvement in pain but continues to experience discomfort, particularly with overhead movements. She expresses concern about the possibility of a refracture. No numbness, tingling, or new neurological symptoms reported.    Objective:    Right Shoulder Examination:    Inspection: No gross deformity No significant swelling present. No erythema or open wounds. No muscle atrophy noted  Palpation: Mild tenderness over the AC joint and greater tuberosity. No distal tenderness beyond mid-arm  Range of Motion: Forward flexion to 0-140 degrees with mild discomfort at end range. Abduction to 0-140 degrees with mild discomfort. External rotation to 0-30 degrees. Internal rotation reaches the lumbar spine  Neurovascular: Sensation intact in axillary, radial, ulnar, median, and musculocutaneous nerve distributions. Normal firing of biceps, triceps, and deltoid muscles. 2+ radial pulse, brisk capillary refill  Special Tests: Mild pain with Neer’s impingement test. Mild discomfort with Hawkin's test. Negative drop arm test. No instability noted with load and shift or sulcus tests    Diagnostic Tests:    X-ray of the right shoulder was reviewed and demonstrates a fully fused prior fracture of the proximal humerus, with no evidence of refracture or new injury. Mild degenerative changes are noted in the glenohumeral and acromioclavicular joints, consistent with posttraumatic arthritis. Overall bony alignment and healing are stable.    Assessment:    Right shoulder pain with  prior healed proximal humerus fracture and mild posttraumatic degenerative joint disease.  Mild AC joint and glenohumeral arthritis, with flare-up of symptoms following recent fall.    Plan:    Additional Workup: No further imaging is required at this time as radiographs confirm complete healing and stable alignment.  Therapy: Recommend continuation of physical therapy focused on range of motion and strengthening. Emphasize exercises that avoid aggravating the AC joint, such as gentle shoulder strengthening and stretching.  Medications: Continue with Meloxicam and Tylenol as needed for pain management.  Procedures: Injection therapy for the AC joint or glenohumeral joint can be considered if conservative management does not relieve symptoms; however, the patient defers injections at this time.  Activity Recommendations: Advise gradual return to activities within tolerance, avoiding repetitive or heavy lifting and limiting overhead movements until symptoms further improve.    Follow-Up: Patient is reassured regarding the stability of the healed fracture. Follow-up as needed if symptoms persist or worsen.      Adolfo Drew DO, CAQSM   Primary Care Sports Medicine

## 2024-11-26 RX ORDER — INSULIN GLARGINE 300 U/ML
INJECTION, SOLUTION SUBCUTANEOUS
Qty: 12 ML | Refills: 0 | Status: SHIPPED | OUTPATIENT
Start: 2024-11-26

## 2024-11-26 NOTE — TELEPHONE ENCOUNTER
Name from pharmacy: Toujeo SoloStar 300 UNIT/ML Subcutaneous Solution Pen-injector         Will file in chart as: TOUJEO SOLOSTAR 300 UNIT/ML Subcutaneous Solution Pen-injector    Sig: INJECT 40 UNITS SUBCUTANEOUSLY IN THE MORNING AND 42 IN THE EVENING    Disp: 12 mL    Refills: 0    Start: 11/25/2024    Class: Normal    Non-formulary    Last ordered: 3 weeks ago (11/4/2024) by Marga Ferreira MD    Last refill: 9/8/2024    Rx #: [id number]    Diabetes Medication Protocol [id number]/25/2024 04:13 PM   Protocol Details Last A1C < 7.5 and within past 6 months    In person appointment or virtual visit in the past 6 mos or appointment in next 3 mos    Microalbumin procedure in past 12 months or taking ACE/ARB    EGFRCR or GFRNAA > 50    GFR in the past 12 months      To be filled at: [address] [phone], [phone]          LOV: 8/9/24  RTC:  2/2025  Recent Labs:  8/9/24    Upcoming OV  2/10/25

## 2024-12-23 ENCOUNTER — OFFICE VISIT (OUTPATIENT)
Age: 74
End: 2024-12-23
Payer: MEDICARE

## 2024-12-23 VITALS
OXYGEN SATURATION: 96 % | BODY MASS INDEX: 29 KG/M2 | HEART RATE: 76 BPM | RESPIRATION RATE: 18 BRPM | WEIGHT: 151 LBS | DIASTOLIC BLOOD PRESSURE: 86 MMHG | SYSTOLIC BLOOD PRESSURE: 128 MMHG

## 2024-12-23 DIAGNOSIS — N18.31 STAGE 3A CHRONIC KIDNEY DISEASE (HCC): Chronic | ICD-10-CM

## 2024-12-23 DIAGNOSIS — Z79.4 INSULIN DEPENDENT TYPE 2 DIABETES MELLITUS (HCC): Primary | ICD-10-CM

## 2024-12-23 DIAGNOSIS — E78.5 HYPERLIPIDEMIA ASSOCIATED WITH TYPE 2 DIABETES MELLITUS (HCC): ICD-10-CM

## 2024-12-23 DIAGNOSIS — I15.2 HYPERTENSION ASSOCIATED WITH DIABETES (HCC): ICD-10-CM

## 2024-12-23 DIAGNOSIS — E11.9 INSULIN DEPENDENT TYPE 2 DIABETES MELLITUS (HCC): Primary | ICD-10-CM

## 2024-12-23 DIAGNOSIS — E11.59 HYPERTENSION ASSOCIATED WITH DIABETES (HCC): ICD-10-CM

## 2024-12-23 DIAGNOSIS — E11.69 HYPERLIPIDEMIA ASSOCIATED WITH TYPE 2 DIABETES MELLITUS (HCC): ICD-10-CM

## 2024-12-23 PROBLEM — N18.30 CKD (CHRONIC KIDNEY DISEASE) STAGE 3, GFR 30-59 ML/MIN (HCC): Chronic | Status: ACTIVE | Noted: 2024-12-23

## 2024-12-23 LAB
CARTRIDGE LOT#: ABNORMAL NUMERIC
GLUCOSE BLOOD: 340
HEMOGLOBIN A1C: 9 % (ref 4.3–5.6)
TEST STRIP LOT #: NORMAL NUMERIC

## 2024-12-23 PROCEDURE — 99215 OFFICE O/P EST HI 40 MIN: CPT

## 2024-12-23 PROCEDURE — 1160F RVW MEDS BY RX/DR IN RCRD: CPT

## 2024-12-23 PROCEDURE — 82947 ASSAY GLUCOSE BLOOD QUANT: CPT

## 2024-12-23 PROCEDURE — 3074F SYST BP LT 130 MM HG: CPT

## 2024-12-23 PROCEDURE — 83036 HEMOGLOBIN GLYCOSYLATED A1C: CPT

## 2024-12-23 PROCEDURE — 1159F MED LIST DOCD IN RCRD: CPT

## 2024-12-23 PROCEDURE — 3079F DIAST BP 80-89 MM HG: CPT

## 2024-12-23 RX ORDER — SEMAGLUTIDE 0.68 MG/ML
INJECTION, SOLUTION SUBCUTANEOUS
Qty: 1 EACH | Refills: 12 | COMMUNITY
Start: 2024-12-23

## 2024-12-23 NOTE — PROGRESS NOTES
Celeste Mann is a 74 year old presenting today to establish for diabetes management.   Primary care physician: Marga Ferreira MD  Today's A1C 9.0  ( last A1C  9.8% )   In-office  mg/dl (last meal 2 hours ago)    Pt speaks Nauruan only and is accompanied by ex-son-in-law. She prefers that son-in-law interprets for her instead of the language line.    Celeste is a pleasant 74 yr-old female who appears her stated age. Referred back to diabetes center by Dr. Ferreira due to uncontrolled DM. Seen by PCP in 2024. Admits to taking insulin as recommended but refill does not reflect this. Seen in diabetes center years prior ( and  - 2 visits in total) however was lost to follow-up.  Diagnosed since  and BG remains elevated with lowest A1c of 8.7 a year ago. Mostly noted to at have an A1c at 9-10%.   Admits to eating poorly and have good appetite. Lives at home with daughter but dtr is not involved in her medical care.   Independent with most self-care and ADLs but unable to drive to appointments, hence, was brought to the clinic by a family member as mentioned above.     Monitoris blood glucose: twice a day (checks before she eats - glucose log brought by pt but wrote only daily result as follows)    115, 126, 165,181, 135, 329, 211, 136, 131    Highest glucose readin mg/dl -> before meals  Lowest glucose readin  mg/dl  Hypoglycemia frequency: rare but feels shaky, dizzy with BGs at around 100 mg/dl.     Had a mechanical fall in 2024. Saw a Sports med and diagnosed with rotator cuff syndrome of right shoulder. Hx of fracture of proximal humerus a yr ago and currently well healed.   Unable to continue w/ physical therapy for the shoulder due to difficulty with getting a ride, but is tolerable with use of OTC topical agents.    No recent medical history change, antibiotic or steroid use.    Diabetes History:  Type 2 ~   A1c -    - 9.0, 9.5, 9/8, 9.8, 10.1   - 10.1,  9.5  2022 - 8.7, 9.2. 10  2021 - 10.8, 9.4  2020 - 9.8, 11.1,  2019 - 8.7    Patient has not had hospitalizations for blood sugar issues  denies any history of pancreatitis    Previous DM therapies:  Januvia - cost 2019  Victoza - 2019 - never started    Current DM Regimen:  Toujeo U300 - 40 units in the morning                42 units in the evening  Metformin Hcl 1000 mg twice a day  Glimepiride 4 mg 2 tablets before breakfast      HGBA1C:    Lab Results   Component Value Date    A1C 9.0 (A) 12/23/2024    A1C 9.8 (H) 08/09/2024    A1C 9.5 (A) 08/09/2024     (H) 08/09/2024       Lab Results   Component Value Date    CHOLEST 170 02/02/2024    CHOLEST 261 (H) 08/17/2023    TRIG 147 02/02/2024    TRIG 184 (H) 08/17/2023    HDL 70 (H) 02/02/2024    HDL 73 (H) 08/17/2023    LDL 75 02/02/2024     (H) 08/17/2023     Lab Results   Component Value Date    MICROALBCREA 695.0 (H) 02/05/2024    MICROALBCREA 765.7 (H) 02/28/2023      Lab Results   Component Value Date    CREATSERUM 1.01 08/09/2024    CREATSERUM 0.90 02/02/2024    EGFRCR 59 (L) 08/09/2024    EGFRCR 68 02/02/2024     Lab Results   Component Value Date    AST 35 (H) 08/09/2024    AST 23 02/02/2024    ALT 23 08/09/2024    ALT 29 02/02/2024       Lab Results   Component Value Date    TSH 3.420 02/17/2022    TSH 2.720 01/28/2019    T4F 1.0 01/28/2019         DM Complications:  Microvascular:   Neuropathy: no  Retinopathy: yes  4/3/24, mild nonprolif DR, both eyes, mild mac edema OS  Nephropathy: yes    Macrovascular:  PVD: no  CAD: no  Stroke/CVA: no        Modifying factors:  Medication adherence: See hx  Barriers: Language/Unable to go to appointments/Transpo  Recent steroids, illness or infections ( past 3m): NO    Allergies: Penicillins and Jardiance [empagliflozin]    Past Medical History:    Essential hypertension    High blood pressure    High cholesterol    NAFLD (nonalcoholic fatty liver disease)    Cirrhosis, 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  Gastric Bx:     Social History     Socioeconomic History    Marital status:    Tobacco Use    Smoking status: Never    Smokeless tobacco: Never   Vaping Use    Vaping status: Never Used   Substance and Sexual Activity    Alcohol use: Not Currently    Drug use: No   Other Topics Concern    Caffeine Concern Yes    Exercise Yes     Comment: 1-2x per week     Social Drivers of Health     Food Insecurity: No Food Insecurity (6/19/2022)    Received from [employer]    Food Insecurity     Within the past 12 months, you worried that your food would run out before you got the money to buy more.: 1     Within the past 12 months, the food you bought just didn't last and you didn't have money to get more.: 1   Transportation Needs: No Transportation Needs (6/19/2022)    Received from [employer]    Transportation Needs     In the past 12 months, has lack of transportation kept you from medical appointments or from getting medications?: 2     In the past 12 months, has lack of transportation kept you from meetings, work, or from getting things needed for daily living?: 2    Received from [facility] Social Needs Screening - Social Connection    Received from [employer]    [facility] Housing     Family History   Problem Relation Age of Onset    Cancer Father         stomach cancer    Diabetes Other     Diabetes Son     Diabetes Sister     Diabetes  Brother      Current Medication List:   Current Outpatient Medications   Medication Sig Dispense Refill    semaglutide (OZEMPIC, 0.25 OR 0.5 MG/DOSE,) 2 MG/3ML Subcutaneous Solution Pen-injector As directed 1 each 12    TOUJEO SOLOSTAR 300 UNIT/ML Subcutaneous Solution Pen-injector INJECT 40 UNITS SUBCUTANEOUSLY IN THE MORNING AND 42 IN THE EVENING 12 mL 0    diclofenac (VOLTAREN) 1 % External Gel Apply 4 g topically every 6 (six) hours as needed. 1 each 2    HYDROCHLOROTHIAZIDE 25 MG Oral Tab Take 1 tablet by mouth once daily 90 tablet 0    cholecalciferol 50 MCG (2000 UT) Oral Cap Take 1 capsule (2,000 Units total) by mouth daily.      hydrALAZINE 50 MG Oral Tab Take 1 tablet (50 mg total) by mouth in the morning and 1 tablet (50 mg total) before bedtime. Replaces 25 mg. 180 tablet 1    atorvastatin 80 MG Oral Tab Take 1 tablet (80 mg total) by mouth daily. 90 tablet 3    glimepiride 4 MG Oral Tab Take 2 tablets (8 mg total) by mouth before breakfast. 180 tablet 3    losartan 100 MG Oral Tab Take 1 tablet (100 mg total) by mouth every evening. Replaces 50 mg 90 tablet 3    Meloxicam 15 MG Oral Tab Take 1 tablet (15 mg total) by mouth daily as needed for Pain (arm pain). 30 tablet 1    alendronate 70 MG Oral Tab Take 1 tablet (70 mg total) by mouth every 7 days. 12 tablet 3    metFORMIN HCl 1000 MG Oral Tab Take 1 tablet (1,000 mg total) by mouth 2 (two) times daily with meals. 180 tablet 3    Glucose Blood (BLOOD GLUCOSE TEST) In Vitro Strip Check your blood sugar before breakfast and 2 hours after lunch. Record all your blood sugar readings and bring to your follow-up appt as discussed. 200 strip 3    Omeprazole 20 MG Oral Tablet Delayed Release Dispersible Take 20 mg by mouth every morning.      Lancets 33G Does not apply Misc 1 Application 2 (two) times a day. 200 each 3    Blood Glucose Monitoring Suppl (ACCU-CHEK GUIDE ME) w/Device Does not apply Kit CHECK YOU BLOOD SUGAR BEFORE BREAKFAST AND 2 HOURS AFTER  LUNCH. RECORD ALL YOUR BLOOD SUGAR READINGS AND BRING TO YOUR FOLLOW-UP APT AS DISCUSSED      Blood Glucose Monitoring Suppl Does not apply Device Check your blood sugar before breakfast and 2 hours after lunch. Record all your blood sugar readings and bring to your follow-up appt as discussed. 300 each 3    Alcohol Swabs 70 % Does not apply Pads Clean finger prior to checking blood glucose 100 each 3    Insulin Pen Needle (BD PEN NEEDLE ORIGINAL U/F) 29G X 12.7MM Does not apply Misc Use for insulin injections twice daily 90 each 11    Glucose Blood (ACCU-CHEK MAME PLUS) In Vitro Strip USE AS DIRECTED TO TEST BLOOD SUGARS 3 (THREE) TIMES DAILY. 300 strip 11    Blood Glucose Calibration (ACCU-CHEK MAME) In Vitro Solution USE AS DIRECTED TO CALIBRATE GLUCOSE MACHINE 1 each 0    BD ULTRA-FINE LANCETS Does not apply Misc Use 3 x daily to inject insulin. Dx E11.69, E78.2 100 each 0    BD ULTRA-FINE LANCETS Does not apply Misc Dx E11.69, E78.2  Check Blood Sugar 3 times daily 100 each 0       DM associated review of  symptoms:   Endocrine: Polyuria, polyphagia, polydipsia: no  Neurological: Paresthesias: no  HEENT: Blurred vision: (+) blurry, chronic  Skin: no rash or wounds  Hematological: Hypoglycemia: no    Review of Systems     LUNGS: denies shortness of breath   CARDIOVASCULAR: denies chest pain  GI: denies abdominal pain, nausea or diarrhea   : denies dysuria  MUSCULOSKELETAL: (+) rt shoulder pain - chronic, kennedy ROM      Physical exam:  /86   Pulse 76   Resp 18   Wt 151 lb (68.5 kg)   SpO2 96%   BMI 28.53 kg/m²   Body mass index is 28.53 kg/m².    Physical Exam   Vitals reviewed.  Constitutional: Normal appearance   Cardiovascular: Normal rate , rhythm   Pulmonary/Chest: Effort normal  Neurological: Alert and oriented .   Psychiatric: Normal mood and affect.       Assessment/Plan:    Hypertension  Well-controlled today  Continue Losartan, hydrochlorothiazide,   CPM    Dyslipidemia:   Last  labs done   8/2024  Continue Atorvastatin      Type 2 Diabetes Mellitus with long term insulin use    A1C:   Lab Results   Component Value Date     (H) 08/09/2024    A1C 9.0 (A) 12/23/2024     Weight: 151 lb   Wt Readings from Last 3 Encounters:   12/23/24 151 lb (68.5 kg)   11/13/24 150 lb (68 kg)   08/09/24 150 lb 12.8 oz (68.4 kg)       Lifestyle modificiation  Start GLP1 - see in DM     Diabetes control is poor. Needs ongoing management and evaluation  given the chronicity of Diabetes, ongoing medication monitoring and risk for complications      Recommendations:   Start GLP1 (Ozempic - sample provided and rx sent  Discussed benefits and mechanism in action with pt/family - see AVS  Discussed to monitor closely, might need to decrease insulin or Glim  Given resources for information including paper copy in Estonian language  Start 0.25 mg once weekly for 4 weeks, if tolerated, increase to 0.5 mg weekly    CGM applied - [place] 2  Discussed use/benefits    Continue  oujeo U300 - 40 units in the morning                42 units in the evening  Metformin Hcl 1000 mg twice a day  Glimepiride 4 mg 2 tablets before breakfast    Reviewed w patient pathophysiology of diabetes, clinical significance of A1c, adverse effects of suboptimal glucose control, and goals of therapy   Reviewed the A1C test, what the value reflects and the goal for the patient.   Reminded pt on A1C and blood sugar targets (Fasting < 130 and post prandial <180 ) and complications associated with hyperglycemia and uncontrolled DM (on AVS)   Recommended SMBG 2- x daily if not using CGM   Reviewed s/s and treatment of hypoglycemia (on AVS)   Reminded to continue with lifestyle modifications since they have positive impact on diabetes/blood sugars/health (portion control, physical activity, weight loss)   Reinforced timing and adherence with medication, self-monitoring of blood glucose and routine follow up    Recommended for  patient to follow up in Diabetes  center to review carb goals, food label reading, and offer further support/guidance with exercise planning and weight loss.     The patient is asked to return in 1 month but recommended to contact DM clinic sooner if questions or concerns.    The patient indicates understanding of these issues and agrees to the plan.      Orders Placed This Encounter    POC Hemoglobin A1C     Order Specific Question:   Release to patient     Answer:   Immediate    ASSAY QUANTITATIVE, GLUCOSE     Order Specific Question:   Release to patient     Answer:   Immediate    semaglutide (OZEMPIC, 0.25 OR 0.5 MG/DOSE,) 2 MG/3ML Subcutaneous Solution Pen-injector     Sig: As directed     Dispense:  1 each     Refill:  12     LOT#[id number], EXP 26 - 1 box     Order Specific Question:   Lot Number?     Answer:   [id number]     Order Specific Question:   Expiration Date?     Answer:   2026     Order Specific Question:   Quantity     Answer:   1     Comments:   box         Diabetes complications & risks surveillance:   A1C/Blood pressure: as reported above   Nephropathy screening:  continue Losartan rx.   LIPID screenin2024 . Atorvatatin rx.   Last dilated eye exam: Last Dilated Eye Exam: 24   Exam shows retinopathy? Eye Exam shows Diabetic Retinopathy?: Yes    Date of last PHQ-2 depression screen: PHQ-2 - Date of last depression screenin2024    Last diabetic foot exam: Last Foot Exam: 24          Note to patient: The  Cures Act makes medical notes like these available to patients in the interest of transparency. However, be advised this is a medical document. It is intended as peer to peer communication. It is written in medical language and may contain abbreviations or verbiage that are unfamiliar. It may appear blunt or direct. Medical documents are intended to carry relevant information, facts as evident, and the clinical opinion of the practitioner.

## 2024-12-23 NOTE — PATIENT INSTRUCTIONS
Plan de seguimiento:  Con Arleth Morales, APRN: En 1 mes    Medicamentos para la diabetes     Continúe: esté atento a la baja glucosa en michelle mientras comenzamos Ozempic    TOUJEO SOLOSTAR 300 UNIT/ML Solución subcutánea Inyector tipo pluma (Tomando) INYECTAR 40 UNIDADES POR VÍA SUBCUTÁNEA POR LA MAÑANA Y 42 POR LA NOCHE - Ajustaremos a shamir dosis más baja tan pronto sari se presente un nivel bajo de glucosa en michelle <100 mg/dl pal 2 veces en 1 semana.    glimepirida 4 MG Comprimido oral (Tomando) [place] 2 comprimidos (8 mg en total) por vía oral antes del desayuno. - También podríamos adaptarnos. Bel medicamento puede provocar niveles bajos de glucosa en michelle    metFORMINA HCl 1000 MG Comprimido oral (Tomando) [place] 1 comprimido (1000 mg en total) por vía oral 2 (dos) veces al día con las comidas.    Comience a duane Ozempic SHAMIR VEZ por semana.   Dosis: 0,25 mg shamir vez por semana x 4 dosis (4 semanas)   Si no tiene ningún síntoma gastrointestinal (hinchazón, diarrea, náuseas o vómitos), aumente la dosis a 0,5 mg shamir vez por semana.     La pluma de muestra que le proporcionaron le durará 6 semanas antes de que tenga que retirar el suministro de medicamentos en la farmacia.     Hay shamir tarjeta de copago de Ozempic. Ahora ofrece $25 por un suministro de 30 o 90 días.   Tiene la opción de enviar un mensaje de texto para solicitar shamir nueva tarjeta de copago.  Envíe un mensaje de texto con la palabra BEGIN al [id number] o visite: [email or web address]     La receta también se enviará a thompson farmacia, shea no retire el medicamento hasta que hayan pasado al menos 4 semanas de tratamiento para asegurarse de que lo tolera.    Los efectos secundarios más comunes de los medicamentos GLP1 (incluidos mounjaro, Ozempic, Trulicity, Rybelsus) son náuseas y malestar gastrointestinal. Aquí hay algunos consejos para reducir estos efectos secundarios:  - si tiene náuseas y comió shamir comida abundante antes de las náuseas, el volumen de  comida puede ser demasiado para que thompson estómago lo pueda manejar, reduzca el tamaño de la comida  - practique la atención plena para dejar de comer shamir vez que esté lleno  - evite comer cuando no tenga hambre  - evite los alimentos con alto contenido de grasa o picantes (en particular pal el período inicial de aumento de la dosis)  - modere thompson consumo de alcohol y bebidas gaseosas (en particular si tiene náuseas y acidez estomacal o eructos)  - Si tiene estreñimiento, asegúrese de beber suficiente agua y comer suficiente fibra  - si nota algunos síntomas de náuseas y no ha comido pal un tiempo, asegúrese de comer al menos emily comidas al día, puede intentar comer bocadillos con alto contenido de proteínas sari yogur, queso en hebras, requesón o batidos de proteínas  - Si ha realizado estos cambios y aún nota náuseas, comuníquese conmigo    Start taking Ozempic ONCE a week.   Dose: 0.25mg once weekly x 4 doses (4 weeks)   If you do not have any GI symptoms (bloating, diarrhea, nausea or vomiting) increase the dose to 0.5mg once a week.     The sample pen that was provided for you will last you 6 weeks before you have to  the medicine supply at the pharmacy.       There is an ozempic co pay card. It now offers $25 for either 30 d or 90 day supply   You have the option to text for new co pay card  Text BEGIN to [id number] - Or you can visit: [facility]     The prescription will also be sent to your pharmacy but do not  the medication until at least 4 weeks on the medication to be sure you are tolerating this medicine.            Updated/current diabetes medication instructions:  Diabetic Medications               TOUJEO SOLOSTAR 300 UNIT/ML Subcutaneous Solution Pen-injector (Taking) INJECT 40 UNITS SUBCUTANEOUSLY IN THE MORNING AND 42 IN THE EVENING    glimepiride 4 MG Oral Tab (Taking) Take 2 tablets (8 mg total) by mouth before breakfast.    metFORMIN HCl 1000 MG Oral Tab (Taking) Take 1  tablet (1,000 mg total) by mouth 2 (two) times daily with meals.            Número de teléfono de la oficina: [phone]; los teléfonos están abiertos de lunes a viernes de 8:00 a 16:00  Anjelica por visitar nuestra oficina. Esperamos trabajar con usted para alcanzar jesus objetivos de angela. [employer], si necesita reabastecimientos, solicítelos con anticipación para que haya tiempo suficiente para procesar la solicitud. Le pedimos que nos notifique con al menos 5 días de anticipación a la fecha de vencimiento de un reabastecimiento, para que haya tiempo de enviar shamir solicitud a la farmacia, procesar el reabastecimiento y asegurarse de que no haya otros problemas para obtener el medicamento (pedidos pendientes, trámites de autorización previa, etc.). Los reabastecimientos de rutina no se atenderán los fines de semana, por lo que envíe estas solicitudes pal la semana.    La [employer] Diabetes recomienda los siguientes objetivos de azúcar en michelle:    Azúcar en michelle en ayunas (antes del desayuno) Objetivo:  (idealmente menos de 110)  2 horas después de comer menos de 180 (idealmente menos de 150)  Objetivo de A1c <7,0 %    Llamada para niveles de azúcar en michelle superiores a 180 más de 2 veces por semana  El objetivo de tiempo dentro del rango es superior al 70 % si se utiliza un monitor de glucosa continuo (Dexcom o Leander).  El tiempo dentro del rango se puede encontrar en la aplicación Dexcom G7, en Clarity si se utiliza Dexcom G6 o en LibreView si se utiliza Leander.       Continue with lifestyle modification and healthier choices for meal.  Do regular activity/exercise. Continue with the strengthening exercises with physical therapy.  Frequent BG monitoring, at most 3 x a day, on random time either before meals or 2 hours after a meal.  Strengthen gut health, can have yogurt, sauerkraut, probiotics but mindful of sugar/carb content.   Include in your routine, stress releasing  activities like yoga, swimming, stretching as tolerated that will help with glucose control.     HOW TO TREAT LOW BLOOD SUGAR (Hypoglycemia)  Low blood sugar= Less than 70, or if you start to have symptoms (below)  Symptoms: Shaking or trembling, fast heart rate, extreme hunger, sweating, confusion/difficulty concentrating, dizziness.    How to treat a low blood sugar if you are able to eat/drink: The Rule of 15  If you are using continuous glucose monitor that says you are low, but you do not have any symptoms, verify on fingerstick that your blood sugar is actually low before treating.   Eat 15 grams of carbs (see examples below)  Check your blood sugar after 15 minutes. If it’s still below your target range, have another serving.   Repeat these steps until it’s in your target range. Once it’s in range, if you're nervous about your sugar going low again, have a protein source (ie, a spoonful of peanut butter).   If you have a CGM you want to look for how your arrow has changed. If you arrow is pointed up or sideways after 15 min, give your CGM more time OR check with a finger stick. Try not to eat more food until at least 15 min after the first BG check - otherwise you risk having a rebound high.  If you are experiencing symptoms and you are unable to check your blood glucose for any reason, treat the hypoglycemia.  If someone has a low blood sugar and is unconscious: Don’t hesitate to call 911. If someone is unconscious and glucagon is not available or someone does not know how to use it, call 911 immediately.    To treat a low, I recommend you carry with you easy, pre-portioned treatment for low blood sugars that are 15G of carbs:   - Children sized squeeze pouch applesauce (high fiber + carbs help prevent too high of a spike)  - Small children's sized juicebox- 15g carb --> 4oz juice box  - Glucose tablets from "[employer], you can find them near diabetes supplies --> Note, you will need to eat 3-4 tablets to  get to 15g of carbs  - Children sized fruit snack pack- look for one with 15 grams of total carbohydrate  - Choice of how to treat your low is important. Complex carbs, or foods that contain fats along with carbs (like chocolate) can slow the absorption of glucose and should NOT be used to treat an emergency low

## 2024-12-26 RX ORDER — HYDROCHLOROTHIAZIDE 12.5 MG/1
1 CAPSULE ORAL
Qty: 2 EACH | Refills: 2 | Status: SHIPPED | OUTPATIENT
Start: 2024-12-26

## 2025-01-03 ENCOUNTER — HOSPITAL ENCOUNTER (OUTPATIENT)
Dept: MAMMOGRAPHY | Age: 75
Discharge: HOME | End: 2025-01-03
Attending: INTERNAL MEDICINE
Payer: MEDICARE

## 2025-01-03 DIAGNOSIS — Z12.31 VISIT FOR SCREENING MAMMOGRAM: ICD-10-CM

## 2025-01-03 PROCEDURE — 77067 SCR MAMMO BI INCL CAD: CPT | Performed by: INTERNAL MEDICINE

## 2025-01-03 PROCEDURE — 77063 BREAST TOMOSYNTHESIS BI: CPT | Performed by: INTERNAL MEDICINE

## 2025-01-20 ENCOUNTER — OFFICE VISIT (OUTPATIENT)
Age: 75
End: 2025-01-20
Payer: MEDICARE

## 2025-01-20 VITALS
BODY MASS INDEX: 27.91 KG/M2 | DIASTOLIC BLOOD PRESSURE: 70 MMHG | HEIGHT: 61 IN | HEART RATE: 76 BPM | RESPIRATION RATE: 18 BRPM | OXYGEN SATURATION: 96 % | SYSTOLIC BLOOD PRESSURE: 150 MMHG | WEIGHT: 147.81 LBS

## 2025-01-20 DIAGNOSIS — E11.9 INSULIN DEPENDENT TYPE 2 DIABETES MELLITUS (HCC): ICD-10-CM

## 2025-01-20 DIAGNOSIS — Z79.4 INSULIN DEPENDENT TYPE 2 DIABETES MELLITUS (HCC): ICD-10-CM

## 2025-01-20 LAB
CREAT UR-SCNC: 89.2 MG/DL
MICROALBUMIN UR-MCNC: 63.6 MG/DL
MICROALBUMIN/CREAT 24H UR-RTO: 713 UG/MG (ref ?–30)

## 2025-01-20 PROCEDURE — 3078F DIAST BP <80 MM HG: CPT

## 2025-01-20 PROCEDURE — 3077F SYST BP >= 140 MM HG: CPT

## 2025-01-20 PROCEDURE — 3008F BODY MASS INDEX DOCD: CPT

## 2025-01-20 PROCEDURE — 99215 OFFICE O/P EST HI 40 MIN: CPT

## 2025-01-20 PROCEDURE — 95251 CONT GLUC MNTR ANALYSIS I&R: CPT

## 2025-01-20 PROCEDURE — 82043 UR ALBUMIN QUANTITATIVE: CPT

## 2025-01-20 PROCEDURE — 1159F MED LIST DOCD IN RCRD: CPT

## 2025-01-20 PROCEDURE — 1160F RVW MEDS BY RX/DR IN RCRD: CPT

## 2025-01-20 PROCEDURE — 82570 ASSAY OF URINE CREATININE: CPT

## 2025-01-20 RX ORDER — PEN NEEDLE, DIABETIC 32GX 5/32"
NEEDLE, DISPOSABLE MISCELLANEOUS
Qty: 200 EACH | Refills: 3 | Status: SHIPPED | OUTPATIENT
Start: 2025-01-20

## 2025-01-20 RX ORDER — INSULIN GLARGINE 100 [IU]/ML
42 INJECTION, SOLUTION SUBCUTANEOUS NIGHTLY
Qty: 18 ML | Refills: 0 | Status: SHIPPED | OUTPATIENT
Start: 2025-01-20

## 2025-01-20 RX ORDER — SEMAGLUTIDE 0.68 MG/ML
INJECTION, SOLUTION SUBCUTANEOUS
Qty: 1 EACH | Refills: 12 | Status: CANCELLED | OUTPATIENT
Start: 2025-01-20

## 2025-01-20 RX ORDER — INSULIN ASPART 100 [IU]/ML
INJECTION, SOLUTION INTRAVENOUS; SUBCUTANEOUS
Qty: 10 ML | Refills: 0 | Status: SHIPPED | OUTPATIENT
Start: 2025-01-20

## 2025-01-20 RX ORDER — DASIGLUCAGON 0.6 MG/.6ML
1 INJECTION, SOLUTION SUBCUTANEOUS SEE ADMIN INSTRUCTIONS
Qty: 0.6 ML | Refills: 0 | Status: SHIPPED | OUTPATIENT
Start: 2025-01-20 | End: 2025-01-21

## 2025-01-20 RX ORDER — HYDROCHLOROTHIAZIDE 12.5 MG/1
1 CAPSULE ORAL
Qty: 6 UNDEFINED | Refills: 2 | Status: SHIPPED | OUTPATIENT
Start: 2025-01-20

## 2025-01-20 RX ORDER — SEMAGLUTIDE 0.68 MG/ML
0.5 INJECTION, SOLUTION SUBCUTANEOUS WEEKLY
Qty: 9 UNDEFINED | Refills: 0 | Status: SHIPPED | OUTPATIENT
Start: 2025-01-20

## 2025-01-20 NOTE — PROGRESS NOTES
Name: Celeste Mann  YOB: 1950  Report Period: 01/07/2025 - 01/20/2025 (14 days)  Generated: 01/20/2025  Time CGM Active: 97%      Glucose Statistics and Targets  Average Glucose: 166 mg/dL  Glucose Management Indicator (GMI): 7.3%  Glucose Variability (%CV): 41.7%  Target Range: 70 - 180 mg/dL      Time in Ranges  Very High: >250 mg/dL --- 15%  High: 181 - 250 mg/dL --- 21%  Target Range: 70 - 180 mg/dL --- 64%  Low: 54 - 69 mg/dL --- 0%  Very Low: <54 mg/dL --- 0%

## 2025-01-20 NOTE — PATIENT INSTRUCTIONS
Disminuir  Toujeo U 300 Inyectar 42 unidades diarias  Glimepirida 4 mg 1 comprimido antes del desayuno    Comenzar con Novolog 100 U 4  Dosis  Si el nivel de azúcar en michelle es inferior a 200, no añadir (solo administrar 4 unidades)  Si el nivel de azúcar en michelle es de 201-250, añadir 2  Si el nivel de azúcar en michelle es de 251-300, añadir 3  Si el nivel de azúcar en michelle es de 301-350, añadir 4  Si el nivel de azúcar en michelle es > 350, añadir 5    Aumentar Ozempic a 0.5 mg semanales    Llamar en mayra de 2 episodios de glucemia <80 mg/dl o >300 mg/dl en un lapso de 1 semanas    Plan de seguimiento:  Con Arleth Morales, APRN: 2 meses    Número de teléfono de la oficina: [phone]; los teléfonos están abiertos de lunes a viernes de 8:00 a 16:00  Anjelica por visitar nuestra oficina. Esperamos trabajar con usted para alcanzar jesus objetivos de angela. [employer] recordatorio, si necesita reposiciones, solicítelas con anticipación para que haya tiempo suficiente para procesar la solicitud. Le pedimos que nos notifique con al menos 5 días de anticipación a la fecha de vencimiento de shamir reposición, para que haya tiempo de enviar shamir solicitud a la farmacia, procesar la reposición y asegurarse de que no haya otros problemas para obtener el medicamento (pedidos pendientes, trámites de autorización previa, etc.). Las reposiciones de rutina no se atenderán los fines de semana, por lo que le solicitamos que envíe estas solicitudes pal la semana.    La [employer] recomienda los siguientes objetivos de azúcar en michelle:     Objetivo de azúcar en michelle en ayunas (antes del desayuno):  (idealmente menos de 110)  2 horas después de comer menos de 180 (idealmente menos de 150)   Objetivo de A1c <7,5 %    Llame si tiene niveles de azúcar en michelle superiores a 180 más de 2 veces por semana  El objetivo de tiempo en rango es superior al 70 % si usa un monitor de glucosa continuo (Dexcom o  [place]ander).  El tiempo en rango se puede encontrar en la aplicación Dexcom G7, en Clarity si usa Dexcom G6 o en LibreView si usa [place].    Continúe con la modificación del estilo de megan y elija comidas más saludables.  Realice actividad/ejercicios regulares. Continúe con los ejercicios de fortalecimiento con fisioterapia.  Monitoreo frecuente de la glucosa en michelle, sari loren 3 veces al día, en un momento aleatorio antes de las comidas o 2 horas después de shamir comida.  Fortalezca la angela intestinal; puede consumir yogur, chucrut y probióticos, shea tenga en cuenta el contenido de azúcar y carbohidratos.   Incluya en thompson rutina actividades que le permitan liberar el estrés, sari yoga, natación y estiramientos, según lo tolere, que ayudarán a controlar la glucosa.      Updated/current diabetes medication instructions:  Diabetic Medications               semaglutide (OZEMPIC, 0.25 OR 0.5 MG/DOSE,) 2 MG/3ML Subcutaneous Solution Pen-injector (Taking) As directed    TOUJEO SOLOSTAR 300 UNIT/ML Subcutaneous Solution Pen-injector (Taking) INJECT 40 UNITS SUBCUTANEOUSLY IN THE MORNING AND 42 IN THE EVENING    glimepiride 4 MG Oral Tab (Taking) Take 2 tablets (8 mg total) by mouth before breakfast.    metFORMIN HCl 1000 MG Oral Tab (Taking) Take 1 tablet (1,000 mg total) by mouth 2 (two) times daily with meals.            Office phone number: [phone]; phones are open Monday-Friday 8:00-4:00  Thank you for visiting our office. We look forward to working with you to reach your health goals. As a reminder, if you need refills, please request early so there is enough time to process the request. We ask that you provide at least 5 days' notice before a refill is due, so there is time to send a request to pharmacy, process the refill, and ensure there are no other problems with obtaining the medication (backorders, prior authorization paperwork, etc). Routine refills will not be addressed on weekends, so please submit these  requests during the week.    American Diabetes Association recommends blood sugar targets:      Fasting blood sugar (before breakfast) Target:    (ideally less than 110)  2 hours after eating less than 180 (ideally less than  150 )   A1c goal <7.5%     Call for blood sugars  greater than 180 more than 2 times in a week  Time in Range goal is higher than 70% if using a continuous glucose monitor (Dexcom or [place]).  Time in Range can be found within the Dexcom G7 tiffanie, on Clarity if using Dexcom G6, or on LibreView if using Leander.     Continue with lifestyle modification and healthier choices for meal.  Do regular activity/exercise. Continue with the strengthening exercises with physical therapy.  Frequent BG monitoring, at most 3 x a day, on random time either before meals or 2 hours after a meal.  Strengthen gut health, can have yogurt, sauerkraut, probiotics but mindful of sugar/carb content.   Include in your routine, stress releasing activities like yoga, swimming, stretching as tolerated that will help with glucose control.     HOW TO TREAT LOW BLOOD SUGAR (Hypoglycemia)  Low blood sugar= Less than 70, or if you start to have symptoms (below)  Symptoms: Shaking or trembling, fast heart rate, extreme hunger, sweating, confusion/difficulty concentrating, dizziness.    How to treat a low blood sugar if you are able to eat/drink: The Rule of 15  If you are using continuous glucose monitor that says you are low, but you do not have any symptoms, verify on fingerstick that your blood sugar is actually low before treating.   Eat 15 grams of carbs (see examples below)  Check your blood sugar after 15 minutes. If it’s still below your target range, have another serving.   Repeat these steps until it’s in your target range. Once it’s in range, if you're nervous about your sugar going low again, have a protein source (ie, a spoonful of peanut butter).   If you have a CGM you want to look for how your arrow has  changed. If you arrow is pointed up or sideways after 15 min, give your CGM more time OR check with a finger stick. Try not to eat more food until at least 15 min after the first BG check - otherwise you risk having a rebound high.  If you are experiencing symptoms and you are unable to check your blood glucose for any reason, treat the hypoglycemia.  If someone has a low blood sugar and is unconscious: Don’t hesitate to call 911. If someone is unconscious and glucagon is not available or someone does not know how to use it, call 911 immediately.    To treat a low, I recommend you carry with you easy, pre-portioned treatment for low blood sugars that are 15G of carbs:   - Children sized squeeze pouch applesauce (high fiber + carbs help prevent too high of a spike)  - Small children's sized juicebox- 15g carb --> 4oz juice box  - Glucose tablets from [employer], you can find them near diabetes supplies --> Note, you will need to eat 3-4 tablets to get to 15g of carbs  - Children sized fruit snack pack- look for one with 15 grams of total carbohydrate  - Choice of how to treat your low is important. Complex carbs, or foods that contain fats along with carbs (like chocolate) can slow the absorption of glucose and should NOT be used to treat an emergency low

## 2025-01-20 NOTE — PROGRESS NOTES
Celeste Mann is a 74 year old presenting today to establish for diabetes management.   Primary care physician: Marga Ferreira MD  Latest A1C 9.0  ( last A1C  9.8% )   Pt speaks [place]an only and is accompanied by ex-son-in-law. She prefers that son-in-law interprets for her instead of the language line.    Seen by me for the first time last month. Referred by Dr. Ferreira due to uncontrolled DM. Last visit, Ozempic started in the office and started using sensor. Denies any abdominal discomfort including n/v/d or constipation. Has good appetite and has difficulty doing portion control.  Admits to taking insulin as needed when BG is >250 mg/dl for both AM/PM doses. Only 2 meals/day.  Reported low blood sugars intermittently but when she drinks juice will see very high spikes.       On Leander 3     Findings:  TIR: 64%  eGMI 7.3%  0% hypoglycemia but noted 4 episodes of hypoglycemia out of the 14 days. Mostly before first meal of the day. Lowest 61mg/dl  High postprandial spikes mostly 300's - and one episode of 400 mg/dl    Had a mechanical fall in October 2024. Saw a Sports med and diagnosed with rotator cuff syndrome of right shoulder. Hx of fracture of proximal humerus a yr ago and currently well healed.   Unable to continue w/ physical therapy for the shoulder due to difficulty with getting a ride, but is tolerable with use of OTC topical agents.    Lives at home with daughter but dtr is not involved in her medical care.   Independent with most self-care and ADLs but unable to drive to appointments, and asks help from ex-son in law.    No recent medical history change, antibiotic or steroid use.    Diabetes History:  Type 2 ~ 1990's  Seen in diabetes ctr 2020 by Laurie but was lost to follow-up  A1c -   2024 - 9.0, 9.5, 9/8, 9.8, 10.1  2023 - 10.1, 9.5  2022 - 8.7, 9.2. 10  2021 - 10.8, 9.4  2020 - 9.8, 11.1,  2019 - 8.7    Patient has not had hospitalizations for blood sugar issues  denies any history of  pancreatitis    Previous DM therapies:  Januvia - cost 2019  Victoza - 2019 - never started    Current DM Regimen:  Toujeo U300 - dosing only if BG >250 mg/dl  40 units in the morning - dosing 3-4x/week  42 units in the evening -> dosing 2-3x/wk  Metformin Hcl 1000 mg twice a day  Glimepiride 4 mg 2 tablets before breakfast  Ozempic  .25 mg weekly -> did not increase dose today (dose every Monday)      HGBA1C:    Lab Results   Component Value Date    A1C 9.0 (A) 12/23/2024    A1C 9.8 (H) 08/09/2024    A1C 9.5 (A) 08/09/2024     (H) 08/09/2024       Lab Results   Component Value Date    CHOLEST 170 02/02/2024    CHOLEST 261 (H) 08/17/2023    TRIG 147 02/02/2024    TRIG 184 (H) 08/17/2023    HDL 70 (H) 02/02/2024    HDL 73 (H) 08/17/2023    LDL 75 02/02/2024     (H) 08/17/2023     Lab Results   Component Value Date    MICROALBCREA 695.0 (H) 02/05/2024    MICROALBCREA 765.7 (H) 02/28/2023      Lab Results   Component Value Date    CREATSERUM 1.01 08/09/2024    CREATSERUM 0.90 02/02/2024    EGFRCR 59 (L) 08/09/2024    EGFRCR 68 02/02/2024     Lab Results   Component Value Date    AST 35 (H) 08/09/2024    AST 23 02/02/2024    ALT 23 08/09/2024    ALT 29 02/02/2024       Lab Results   Component Value Date    TSH 3.420 02/17/2022    TSH 2.720 01/28/2019    T4F 1.0 01/28/2019       DM Complications:  Microvascular:   Neuropathy: no  Retinopathy: yes  4/3/24, mild nonprolif DR, both eyes, mild mac edema OS  Nephropathy: yes    Macrovascular:  PVD: no  CAD: no  Stroke/CVA: no      Modifying factors:  Medication adherence: See hx. 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  Gastric Bx:     Social History     Socioeconomic History    Marital status:    Tobacco Use    Smoking status: Never    Smokeless tobacco: Never   Vaping Use    Vaping status: Never Used   Substance and Sexual Activity    Alcohol use: Not Currently    Drug use: No   Other Topics Concern    Caffeine Concern Yes    Exercise Yes     Comment: 1-2x per week     Social Drivers of Health     Food Insecurity: No Food Insecurity (6/19/2022)    Received from [employer] [facility]    Food Insecurity     Within the past 12 months, you worried that your food would run out before you got the money to buy more.: 1     Within the past 12 months, the food you bought just didn't last and you didn't have money to get more.: 1   Transportation Needs: No Transportation Needs (6/19/2022)    Received from [employer] [facility]    Transportation Needs     In the past 12 months, has lack of transportation kept you from medical appointments or from getting medications?: 2     In the past 12 months, has lack of transportation kept you from meetings, work, or from getting things needed for daily living?: 2    Received [facility] Social Needs Screening - Social Connection    Received from [employer] [facility]    [facility]     Family History   Problem Relation Age of Onset    Cancer Father         stomach cancer    Diabetes Other     Diabetes Son     Diabetes Sister     Diabetes Brother       Current Medication List:   Current Outpatient Medications   Medication Sig Dispense Refill    Continuous Glucose Sensor (FREESTYLE ANA PAULA 3 PLUS SENSOR) Does not apply Misc 1 each by Other route every 14 (fourteen) days. 6 Undefined 2    semaglutide (OZEMPIC, 0.25 OR 0.5 MG/DOSE,) 2 MG/3ML Subcutaneous Solution Pen-injector Inject 0.5 mg into the skin once a week. 9 Undefined 0    insulin glargine (LANTUS) 100 UNIT/ML Subcutaneous Solution Inject 42 Units into the skin nightly. 18 mL 0    insulin aspart (NOVOLOG FLEXPEN) 100 Units/mL Subcutaneous Solution Pen-injector Uses up to 20 units daily as directed in divided doses 10 mL 0    BD PEN NEEDLE ELENITA 2ND GEN 32G X 4 MM Does not apply Misc 3 injections daily 200 each 3    Dasiglucagon HCl (ZEGALOGUE) 0.6 MG/0.6ML Subcutaneous Solution Auto-injector Inject 1 each into the skin See Admin Instructions for 1 day. As needed for severe hypoglycemia. 0.6 mL 0    diclofenac (VOLTAREN) 1 % External Gel Apply 4 g topically every 6 (six) hours as needed. 1 each 2    HYDROCHLOROTHIAZIDE 25 MG Oral Tab Take 1 tablet by mouth once daily 90 tablet 0    cholecalciferol 50 MCG (2000 UT) Oral Cap Take 1 capsule (2,000 Units total) by mouth daily.      hydrALAZINE 50 MG Oral Tab Take 1 tablet (50 mg total) by mouth in the morning and 1 tablet (50 mg total) before bedtime. Replaces 25 mg. 180 tablet 1    atorvastatin 80 MG Oral Tab Take 1 tablet (80 mg total) by mouth daily. 90 tablet 3    glimepiride 4 MG Oral Tab Take 2 tablets (8 mg total) by mouth before breakfast. 180 tablet 3    losartan 100 MG Oral Tab Take 1 tablet (100 mg total) by mouth every evening. Replaces 50 mg 90 tablet 3    Meloxicam 15 MG Oral Tab Take 1 tablet (15 mg total) by mouth daily as needed for Pain (arm pain). 30 tablet 1    alendronate 70 MG Oral Tab Take 1 tablet (70 mg total) by mouth every 7 days. 12 tablet 3    metFORMIN HCl 1000 MG Oral Tab Take 1 tablet (1,000 mg total) by mouth 2 (two) times daily  with meals. 180 tablet 3    Glucose Blood (BLOOD GLUCOSE TEST) In Vitro Strip Check your blood sugar before breakfast and 2 hours after lunch. Record all your blood sugar readings and bring to your follow-up appt as discussed. 200 strip 3    Omeprazole 20 MG Oral Tablet Delayed Release Dispersible Take 20 mg by mouth every morning.      Lancets 33G Does not apply Misc 1 Application 2 (two) times a day. 200 each 3    Blood Glucose Monitoring Suppl (ACCU-CHEK GUIDE ME) w/Device Does not apply Kit CHECK YOU BLOOD SUGAR BEFORE BREAKFAST AND 2 HOURS AFTER LUNCH. RECORD ALL YOUR BLOOD SUGAR READINGS AND BRING TO YOUR FOLLOW-UP APT AS DISCUSSED      Blood Glucose Monitoring Suppl Does not apply Device Check your blood sugar before breakfast and 2 hours after lunch. Record all your blood sugar readings and bring to your follow-up appt as discussed. 300 each 3    Alcohol Swabs 70 % Does not apply Pads Clean finger prior to checking blood glucose 100 each 3    Insulin Pen Needle (BD PEN NEEDLE ORIGINAL U/F) 29G X 12.7MM Does not apply Misc Use for insulin injections twice daily 90 each 11    Glucose Blood (ACCU-CHEK MAME PLUS) In Vitro Strip USE AS DIRECTED TO TEST BLOOD SUGARS 3 (THREE) TIMES DAILY. 300 strip 11    Blood Glucose Calibration (ACCU-CHEK MAME) In Vitro Solution USE AS DIRECTED TO CALIBRATE GLUCOSE MACHINE 1 each 0    BD ULTRA-FINE LANCETS Does not apply Misc Use 3 x daily to inject insulin. Dx E11.69, E78.2 100 each 0    BD ULTRA-FINE LANCETS Does not apply Misc Dx E11.69, E78.2  Check Blood Sugar 3 times daily 100 each 0     DM associated review of  symptoms:   Endocrine: Polyuria, polyphagia, polydipsia: no  Neurological: Paresthesias: no  HEENT: Blurred vision: (+) blurry, chronic  Skin: no rash or wounds  Hematological: Hypoglycemia: yes; see sensor     Review of Systems     LUNGS: denies shortness of breath   CARDIOVASCULAR: denies chest pain  GI: denies abdominal pain, nausea or diarrhea   : denies  dysuria  MUSCULOSKELETAL: (+) rt shoulder pain - chronic, limited ROM    Physical exam:  /70   Pulse 76   Resp 18   Ht 5' 1\" (1.549 m)   Wt 147 lb 12.8 oz (67 kg)   SpO2 96%   BMI 27.93 kg/m²   Body mass index is 27.93 kg/m².    Physical Exam   Vitals reviewed. BP Redone. SkippedBP meds since yesterday.  Constitutional: Normal appearance   Cardiovascular: Normal rate , rhythm   Pulmonary/Chest: Effort normal  Neurological: Alert and oriented .   Psychiatric: Normal mood and affect.     Diabetes foot exam:   Good foot hygiene.   Bilateral barefoot skin diabetic exam: normal.  Visualized feet and the appearance : normal.  Bilateral monofilament/sensation of both feet is diminished. Vibration to dorsum to the first toe perceived.   Bilateral 2+ pedal pulse pedal pulse exam      Assessment/Plan:    Hypertension  Elevated today, inconsistent with meds  Continue Losartan, hydrochlorothiazide. Take pills as soon as you get home  Discussed complications of High BP  Monitor BP at home    Dyslipidemia:   Last  labs done  8/2024  Continue Atorvastatin prescription   Consider increasing dose on next OV  Rpt Lipid panel in 3 months      Type 2 Diabetes Mellitus with long term insulin use    A1C:   Lab Results   Component Value Date     (H) 08/09/2024    A1C 9.0 (A) 12/23/2024     Weight: 147 lb (lost 3 lbs since start of GLP1 12/23/24)  Wt Readings from Last 3 Encounters:   01/20/25 147 lb 12.8 oz (67 kg)   12/23/24 151 lb (68.5 kg)   11/13/24 150 lb (68 kg)        Diabetes control is improving (GMI better 7.3% ). Needs ongoing management and evaluation  given the chronicity of Diabetes, ongoing medication monitoring and risk for complications      Recommendations:     Decrease Toujeo U300 to 42 units daily  Decrease Glimepiride 4 mg 1 tablet before breakfast  Increase Ozempic to 0.5 mg weekly (give extra 0.25 mg today, then next 0.5 mg)  Start Novolog 100U dose 4 units for each meal  Dose   If blood sugar  below 200 mg/dl - dose 4 units  If blood sugar is 201-250 mg/dl - + 2  If blood sugar is 251-300 mg/dl -  + 3  If blood sugar is 301-350 mg/dl - + 4  If blood sugar > 350 add 5  Rx Dasiglucagon (Zegal[place]) - discussed use, when to use, storage, showed demo pen on how to use. Let family member knows where medication is at.     I will check on CGM trends on Friday 1/24/25. Please expect a call from in - pt prefers 8-12 pm.   Please call for 2 episodes of BG <80 mg/dl or >300 mg/dl in a span of 1 weeks    Follow up plan:  With Arleth Morales, APRN: 2 months    Reviewed w patient pathophysiology of diabetes, clinical significance of A1c, adverse effects of suboptimal glucose control, and goals of therapy   Reviewed the A1C test, what the value reflects and the goal for the patient. Goal of 7.5%.   Reminded pt on A1C and blood sugar targets (Fasting < 130 and post prandial <180 ) and complications associated with hyperglycemia and uncontrolled DM (on AVS)   Recommended SMBG 3-4 - x daily if not using CGM   Reviewed s/s and treatment of hypoglycemia (on AVS)   Reminded to continue with lifestyle modifications since they have positive impact on diabetes/blood sugars/health (portion control, physical activity, weight loss)   Reinforced timing and adherence with medication, self-monitoring of blood glucose and routine follow up  Discussed avoidance of hypoglycemia - do Rule of 15 (given resources in Croatian, on how to treat)     Recommended for  patient to follow up in Diabetes center to review carb goals, food label reading, and offer further support/guidance with exercise planning and weight loss - Pt declined at this time    The patient is asked to return in 2 month but recommended to contact DM clinic sooner if questions or concerns.    The patient indicates understanding of these issues and agrees to the plan.      Orders Placed This Encounter    Microalb/Creat Ratio, Random Urine    Lipid Panel     Standing Status:    Future     Standing Expiration Date:   2026    Basic Metabolic Panel (8)     Standing Status:   Future     Standing Expiration Date:   2026    Continuous Glucose Sensor (FREESTYLE ANA PAULA 3 PLUS SENSOR) Does not apply Misc     Si each by Other route every 14 (fourteen) days.     Dispense:  6 Undefined     Refill:  2    semaglutide (OZEMPIC, 0.25 OR 0.5 MG/DOSE,) 2 MG/3ML Subcutaneous Solution Pen-injector     Sig: Inject 0.5 mg into the skin once a week.     Dispense:  9 Undefined     Refill:  0    insulin glargine (LANTUS) 100 UNIT/ML Subcutaneous Solution     Sig: Inject 42 Units into the skin nightly.     Dispense:  18 mL     Refill:  0    insulin aspart (NOVOLOG FLEXPEN) 100 Units/mL Subcutaneous Solution Pen-injector     Sig: Uses up to 20 units daily as directed in divided doses     Dispense:  10 mL     Refill:  0    BD PEN NEEDLE ELENITA 2ND GEN 32G X 4 MM Does not apply Misc     Sig: 3 injections daily     Dispense:  200 each     Refill:  3    Dasiglucagon HCl (ZEGALOGUE) 0.6 MG/0.6ML Subcutaneous Solution Auto-injector     Sig: Inject 1 each into the skin See Admin Instructions for 1 day. As needed for severe hypoglycemia.     Dispense:  0.6 mL     Refill:  0     Please give instruction in Slovak.       Diabetes complications & risks surveillance:   A1C/Blood pressure: as reported above   Nephropathy screening: urine sent today continue Losartan rx.   LIPID screenin2024 . Atorvatatin rx.   Last dilated eye exam: Last Dilated Eye Exam: 24   Exam shows retinopathy? Eye Exam shows Diabetic Retinopathy?: Yes  Date of last PHQ-2 depression screen: PHQ-2 - Date of last depression screenin2024    Last diabetic foot exam: Last Foot Exam: 25          Note to patient: The  Cures Act makes medical notes like these available to patients in the interest of transparency. However, be advised this is a medical document. It is intended as peer to peer communication. It is  written in medical language and may contain abbreviations or verbiage that are unfamiliar. It may appear blunt or direct. Medical documents are intended to carry relevant information, facts as evident, and the clinical opinion of the practitioner.

## 2025-01-23 ENCOUNTER — TELEPHONE (OUTPATIENT)
Facility: CLINIC | Age: 75
End: 2025-01-23

## 2025-01-23 DIAGNOSIS — E11.9 INSULIN DEPENDENT TYPE 2 DIABETES MELLITUS (HCC): Primary | ICD-10-CM

## 2025-01-23 DIAGNOSIS — Z79.4 INSULIN DEPENDENT TYPE 2 DIABETES MELLITUS (HCC): Primary | ICD-10-CM

## 2025-01-23 RX ORDER — INSULIN GLARGINE 100 [IU]/ML
INJECTION, SOLUTION SUBCUTANEOUS
Qty: 15 ML | Refills: 0 | Status: SHIPPED | OUTPATIENT
Start: 2025-01-23

## 2025-01-23 NOTE — TELEPHONE ENCOUNTER
Received fax from [facility] needing clarification on rx for lantus - if solostar pen or vial?    Confirmed with gayle for solostar pen, pended new rx to [facility]

## 2025-01-24 RX ORDER — INSULIN ASPART 100 [IU]/ML
INJECTION, SOLUTION INTRAVENOUS; SUBCUTANEOUS
Qty: 10 ML | Refills: 0 | Status: CANCELLED | OUTPATIENT
Start: 2025-01-24

## 2025-01-24 NOTE — TELEPHONE ENCOUNTER
Call placed to patient to discuss with , provider agreeable to switching to DME - will need to start orders to [facility] if patient agreeable.     Advised patient on email address and patient confirmed: [email or web address].     Patient was concerned about why prescription was costing so much - advised needing to meet deductible - patient wanted to discuss with agent- advised that would be good, that was just what I was told from pharmacy /insurance and mail order pharmacy. Patient asked who the money was paid to, advised that mail order pharmacy/pharmacy runs pricing through insurance, in her case [employer], [employer] lets them know what patient needs to pay/deductible and what they cover. Advised the payments can be spread out over time, and per insurance they advised that once the around $800 is met, they will cover the rest. Patient did not realize a deductible needed to be met with insurance, stated she is going to speak with agent, agreed with patient and advised to let us know/keep us updated, sounded like patient ended call.

## 2025-01-24 NOTE — TELEPHONE ENCOUNTER
Spoke with [employer] medicare, Min at [phone] - checked into coverage/co-pay for [facility] -     Min advised following information:     Ozempic - [facility] 90-day supply: $131      - Retail 30-day supply: $47    Lantus U-100 - [facility] 90-day supply: $95                         - Retail 30-day supply: $35    Leander 3+ - [facility] 90-day supply, through part B, not D: $694.96                - Retail 30-day supply: $226.26                -DME: patient will pay 11% of cost and insurance will cover the rest    Asked if patient might have to be paying a deductible or something else? She advised patient has already met her initial deductible coverage and has $794 left to spend before being in catastrophic phase - meaning complete coverage of supplies.

## 2025-01-24 NOTE — TELEPHONE ENCOUNTER
Received another fax - calling [facility] at [phone] to confirm solostar pen    They did receive the updated script yesterday and it was approved

## 2025-01-24 NOTE — TELEPHONE ENCOUNTER
Patient called office- asked to speak with Arleth or Sabina, advised I can conference in Gibraltarian-speaking phone , added  to line.     Patient advised that medications were supposed to be sent to go through mail-order pharmacy. She had picked up her Novolog and testing supplies at [facility] pharmacy, saw Metformin prescription is also there. Patient advised that [facility]/Mail order pharmacy through [email or web address], had called her and advised her that they had not sent her other medications, Lantus, which had been sent again yesterday per their request, as well as Leander 3 sensors and Ozempic, because they needed an email - which the patient does not have.     Advised patient I will reach out to them and see what the issue is, as they should be able to confirm over the phone for orders. Advised I will call her back later on with update.     Call placed to [facility] to confirm what is needed at this time, able to confirm over phone rather than email? Discussed with pharmacy tech - advised they do have an email on file, to which patient should also get updates - 3 medications are in the process of getting shipped - the combined co-pay for the medications is around $600, someone from their financial department should be reaching out to her related to payment     Call placed to patient - advising that they are processing medications at this time- does have a co-pay - should be someone from finance department calling - $600 co-pay, likely includes deductible, can get more of a price breakdown. Patient asked what email - I had not made note of it at time of call - advised I do not know - we have emails on file, looks like maybe with daughter - Carmen. She was not sure of what email they would have - had set it up a while ago. Last time she had gotten a Leander sensor it only cost $35. Patient paid $85 for Novolog yesterday. Was wondering about the cost difference, thought mail order was supposed to  be cheaper, advised it depends on insurance, may be cheaper through mail order or at a retail pharmacy like [facility]. Patient will be needing new sensor soon, but otherwise is okay on medications. Asked if wanting to send to [facility] to check cost possibly being cheaper? Patient asked how we would know if alternative pharmacy would be cheaper, advised we would have to talk to insurance or send a prescription. Not able to check price on my end without reaching out to someone.     Call placed to [facility] to confirm email and cost breakdown- 3 medications -     Checked medication report- other medications had been previously sent to [facility] pharmacy, including Leander sensors.   Per pharmacy tech - email on file is [email or web address]- which we also have on file.   Ozempic 84 day supply - $381  Lantus 30 days - $70  Leander 3+ 90 day supply - $224.82    Recommended we or patient talk to pharmacy  [phone] - can look into price more and possible alternatives. Staff saw that patient would have higher deductible at this time related to donut hole with insurance?

## 2025-01-27 ENCOUNTER — TELEPHONE (OUTPATIENT)
Facility: CLINIC | Age: 75
End: 2025-01-27

## 2025-01-27 ENCOUNTER — TELEPHONE (OUTPATIENT)
Dept: INTERNAL MEDICINE CLINIC | Facility: CLINIC | Age: 75
End: 2025-01-27

## 2025-01-27 NOTE — TELEPHONE ENCOUNTER
Order/RX form for Freestyle Leander CGM/Pump Insulin Supply form received. Placed in [place] basket for review and signature.

## 2025-01-27 NOTE — TELEPHONE ENCOUNTER
CGM Trends checked    Name: Celeste Mann  YOB: 1950  Report Period: 01/14/2025 - 01/27/2025 (14 days)  Generated: 01/27/2025  Time CGM Active: 96%      Glucose Statistics and Targets  Average Glucose: 145 mg/dL  Glucose Management Indicator (GMI): 6.8%  Glucose Variability (%CV): 39.0%  Target Range: 70 - 180 mg/dL      Time in Ranges  Very High: >250 mg/dL --- 8%  High: 181 - 250 mg/dL --- 14%  Target Range: 70 - 180 mg/dL --- 77%  Low: 54 - 69 mg/dL --- 1%  Very Low: <54 mg/dL --- 0%    Findings:   TIR 77% (improved from 64%)  <1% hypoglycemia. 2 episodes on 1/25 after lunch and after dinner.  Minimal postprandial elevation and goes back in range within 2 hours.    Recommendation:  Continue with sensor (cosme 3)  Stop Glimepiride  Continue With Toujeou 300 U, 42 units daily  Continue Ozempic 0.5 mg weekly  Change Novolog sliding scale to as needed    Do not dose <200 mg/dl  201 - 250 = dose 1 units  251 - 300 mg/dl - dose 2 units  301 - 350 mg/dl - dose 3 units  >350 mg/dl - dose 4 units    If you have 2 episodes of  <80 mg/dl or > persistent 250 mg/dl lasting 3 or more hours in a span of 1 week. Please reach out and we can adjust your insulin.     Reach out if you have any other questions or concerns,

## 2025-01-29 ENCOUNTER — TELEPHONE (OUTPATIENT)
Facility: CLINIC | Age: 75
End: 2025-01-29

## 2025-02-05 ENCOUNTER — LAB ENCOUNTER (OUTPATIENT)
Dept: LAB | Age: 75
End: 2025-02-05
Payer: MEDICARE

## 2025-02-05 DIAGNOSIS — Z79.4 INSULIN DEPENDENT TYPE 2 DIABETES MELLITUS (HCC): ICD-10-CM

## 2025-02-05 DIAGNOSIS — E11.9 INSULIN DEPENDENT TYPE 2 DIABETES MELLITUS (HCC): ICD-10-CM

## 2025-02-05 LAB
ANION GAP SERPL CALC-SCNC: 12 MMOL/L (ref 0–18)
BUN BLD-MCNC: 20 MG/DL (ref 9–23)
CALCIUM BLD-MCNC: 10 MG/DL (ref 8.7–10.6)
CHLORIDE SERPL-SCNC: 104 MMOL/L (ref 98–112)
CHOLEST SERPL-MCNC: 168 MG/DL (ref ?–200)
CO2 SERPL-SCNC: 23 MMOL/L (ref 21–32)
CREAT BLD-MCNC: 1.59 MG/DL
EGFRCR SERPLBLD CKD-EPI 2021: 34 ML/MIN/1.73M2 (ref 60–?)
FASTING PATIENT LIPID ANSWER: YES
FASTING STATUS PATIENT QL REPORTED: YES
GLUCOSE BLD-MCNC: 134 MG/DL (ref 70–99)
HDLC SERPL-MCNC: 51 MG/DL (ref 40–59)
LDLC SERPL CALC-MCNC: 77 MG/DL (ref ?–100)
NONHDLC SERPL-MCNC: 117 MG/DL (ref ?–130)
OSMOLALITY SERPL CALC.SUM OF ELEC: 293 MOSM/KG (ref 275–295)
POTASSIUM SERPL-SCNC: 4 MMOL/L (ref 3.5–5.1)
SODIUM SERPL-SCNC: 139 MMOL/L (ref 136–145)
TRIGL SERPL-MCNC: 247 MG/DL (ref 30–149)
VLDLC SERPL CALC-MCNC: 39 MG/DL (ref 0–30)

## 2025-02-05 PROCEDURE — 80048 BASIC METABOLIC PNL TOTAL CA: CPT

## 2025-02-05 PROCEDURE — 36415 COLL VENOUS BLD VENIPUNCTURE: CPT

## 2025-02-05 PROCEDURE — 80061 LIPID PANEL: CPT

## 2025-02-06 ENCOUNTER — TELEPHONE (OUTPATIENT)
Facility: CLINIC | Age: 75
End: 2025-02-06

## 2025-02-06 NOTE — TELEPHONE ENCOUNTER
Called patient:    Recommendation:     Decrease Continue With Toujeou 300 U, 40 units daily     Continue Novolog sliding scale to as needed     Do not dose <200 mg/dl  201 - 250 = dose 1 units  251 - 300 mg/dl - dose 2 units  301 - 350 mg/dl - dose 3 units  >350 mg/dl - dose 4 units     If you have 2 episodes of  <80 mg/dl or > persistent 250 mg/dl lasting 3 or more hours in a span of 1 week. Please reach out and we can adjust your insulin    Patient verbalized understanding.  Patient will try to come in tomorrow to fill out Patient assistance forms.

## 2025-02-06 NOTE — TELEPHONE ENCOUNTER
[place] download: Checking on trends  Name: Celeste Mann    YOB: 1950  Report Period: 01/10/2025 - 02/06/2025 (28 days)  Generated: 02/06/2025  Time CGM Active: 98%      Glucose Statistics and Targets  Average Glucose: 151 mg/dL  Glucose Management Indicator (GMI): 6.9%  Glucose Variability (%CV): 38.4%  Target Range: 70 - 180 mg/dL      Time in Ranges  Very High: >250 mg/dL --- 8%  High: 181 - 250 mg/dL --- 17%  Target Range: 70 - 180 mg/dL --- 74%  Low: 54 - 69 mg/dL --- 1%  Very Low: <54 mg/dL --- 0%    Recommendation:    Decrease Continue With Toujeou 300 U, 40 units daily  Continue Ozempic 0.5 mg weekly, and if cost prohibitive we will try Patient assistance program    Continue Novolog sliding scale to as needed     Do not dose <200 mg/dl  201 - 250 = dose 1 units  251 - 300 mg/dl - dose 2 units  301 - 350 mg/dl - dose 3 units  >350 mg/dl - dose 4 units     If you have 2 episodes of  <80 mg/dl or > persistent 250 mg/dl lasting 3 or more hours in a span of 1 week. Please reach out and we can adjust your insulin.

## 2025-02-07 ENCOUNTER — NURSE ONLY (OUTPATIENT)
Facility: CLINIC | Age: 75
End: 2025-02-07
Payer: MEDICARE

## 2025-02-07 DIAGNOSIS — E11.65 UNCONTROLLED TYPE 2 DIABETES MELLITUS WITH HYPERGLYCEMIA (HCC): Primary | ICD-10-CM

## 2025-02-25 ENCOUNTER — TELEPHONE (OUTPATIENT)
Facility: CLINIC | Age: 75
End: 2025-02-25

## 2025-02-25 LAB — AMB EXT GMI: 7.1 %

## 2025-02-25 NOTE — TELEPHONE ENCOUNTER
Name: Celeste Mann  YOB: 1950  Report Period: 01/29/2025 - 02/25/2025 (28 days)  Generated: 02/25/2025  Time CGM Active: 87%      Glucose Statistics and Targets  Average Glucose: 159 mg/dL  Glucose Management Indicator (GMI): 7.1%  Glucose Variability (%CV): 37.1%  Target Range: 70 - 180 mg/dL      Time in Ranges  Very High: >250 mg/dL --- 9%  High: 181 - 250 mg/dL --- 24%  Target Range: 70 - 180 mg/dL --- 67%  Low: 54 - 69 mg/dL --- 0%  Very Low: <54 mg/dL --- 0%

## 2025-03-18 ENCOUNTER — TELEPHONE (OUTPATIENT)
Dept: INTERNAL MEDICINE CLINIC | Facility: CLINIC | Age: 75
End: 2025-03-18

## 2025-03-19 NOTE — TELEPHONE ENCOUNTER
Requested forms sent over to [employer]  Confirmation received  Placed in [place] completed paperwork accordion

## 2025-03-19 NOTE — TELEPHONE ENCOUNTER
Paperwork signed and placed in out-basket. Please send them my last 2 office visit notes and her medication list.

## 2025-03-24 ENCOUNTER — OFFICE VISIT (OUTPATIENT)
Dept: INTERNAL MEDICINE CLINIC | Facility: CLINIC | Age: 75
End: 2025-03-24
Payer: MEDICARE

## 2025-03-24 ENCOUNTER — HOSPITAL ENCOUNTER (OUTPATIENT)
Age: 75
Discharge: HOME | End: 2025-03-24
Attending: EMERGENCY MEDICINE
Payer: MEDICARE

## 2025-03-24 ENCOUNTER — APPOINTMENT (OUTPATIENT)
Dept: GENERAL RADIOLOGY | Age: 75
End: 2025-03-24
Attending: EMERGENCY MEDICINE
Payer: MEDICARE

## 2025-03-24 ENCOUNTER — TELEPHONE (OUTPATIENT)
Dept: INTERNAL MEDICINE CLINIC | Facility: CLINIC | Age: 75
End: 2025-03-24

## 2025-03-24 VITALS
HEIGHT: 61 IN | WEIGHT: 144.38 LBS | RESPIRATION RATE: 14 BRPM | BODY MASS INDEX: 27.26 KG/M2 | TEMPERATURE: 98 F | DIASTOLIC BLOOD PRESSURE: 70 MMHG | HEART RATE: 74 BPM | SYSTOLIC BLOOD PRESSURE: 128 MMHG | OXYGEN SATURATION: 98 %

## 2025-03-24 VITALS
DIASTOLIC BLOOD PRESSURE: 83 MMHG | HEART RATE: 84 BPM | SYSTOLIC BLOOD PRESSURE: 177 MMHG | OXYGEN SATURATION: 97 % | RESPIRATION RATE: 18 BRPM | TEMPERATURE: 98 F

## 2025-03-24 DIAGNOSIS — E11.29 DIABETES MELLITUS WITH ALBUMINURIA (HCC): ICD-10-CM

## 2025-03-24 DIAGNOSIS — M80.00XD AGE-RELATED OSTEOPOROSIS WITH CURRENT PATHOLOGICAL FRACTURE WITH ROUTINE HEALING: ICD-10-CM

## 2025-03-24 DIAGNOSIS — Z79.4 INSULIN DEPENDENT TYPE 2 DIABETES MELLITUS (HCC): ICD-10-CM

## 2025-03-24 DIAGNOSIS — E11.59 HYPERTENSION ASSOCIATED WITH DIABETES (HCC): ICD-10-CM

## 2025-03-24 DIAGNOSIS — Z79.4 TYPE 2 DIABETES MELLITUS WITH BOTH EYES AFFECTED BY MILD NONPROLIFERATIVE RETINOPATHY AND MACULAR EDEMA, WITH LONG-TERM CURRENT USE OF INSULIN (HCC): ICD-10-CM

## 2025-03-24 DIAGNOSIS — E11.3213 TYPE 2 DIABETES MELLITUS WITH BOTH EYES AFFECTED BY MILD NONPROLIFERATIVE RETINOPATHY AND MACULAR EDEMA, WITH LONG-TERM CURRENT USE OF INSULIN (HCC): ICD-10-CM

## 2025-03-24 DIAGNOSIS — Z00.00 ROUTINE GENERAL MEDICAL EXAMINATION AT A HEALTH CARE FACILITY: Primary | ICD-10-CM

## 2025-03-24 DIAGNOSIS — E78.5 HYPERLIPIDEMIA ASSOCIATED WITH TYPE 2 DIABETES MELLITUS (HCC): ICD-10-CM

## 2025-03-24 DIAGNOSIS — M25.562 CHRONIC PAIN OF LEFT KNEE: ICD-10-CM

## 2025-03-24 DIAGNOSIS — E11.9 INSULIN DEPENDENT TYPE 2 DIABETES MELLITUS (HCC): ICD-10-CM

## 2025-03-24 DIAGNOSIS — R80.9 DIABETES MELLITUS WITH ALBUMINURIA (HCC): ICD-10-CM

## 2025-03-24 DIAGNOSIS — M25.562 ACUTE PAIN OF LEFT KNEE: Primary | ICD-10-CM

## 2025-03-24 DIAGNOSIS — E66.3 OVERWEIGHT (BMI 25.0-29.9): ICD-10-CM

## 2025-03-24 DIAGNOSIS — G89.29 CHRONIC PAIN OF LEFT KNEE: ICD-10-CM

## 2025-03-24 DIAGNOSIS — I15.2 HYPERTENSION ASSOCIATED WITH DIABETES (HCC): ICD-10-CM

## 2025-03-24 DIAGNOSIS — M81.0 AGE-RELATED OSTEOPOROSIS WITHOUT CURRENT PATHOLOGICAL FRACTURE: Primary | ICD-10-CM

## 2025-03-24 DIAGNOSIS — E11.69 HYPERLIPIDEMIA ASSOCIATED WITH TYPE 2 DIABETES MELLITUS (HCC): ICD-10-CM

## 2025-03-24 DIAGNOSIS — E11.3513 PROLIFERATIVE DIABETIC RETINOPATHY OF BOTH EYES WITH MACULAR EDEMA ASSOCIATED WITH TYPE 2 DIABETES MELLITUS (HCC): ICD-10-CM

## 2025-03-24 DIAGNOSIS — K74.60 HEPATIC CIRRHOSIS, UNSPECIFIED HEPATIC CIRRHOSIS TYPE, UNSPECIFIED WHETHER ASCITES PRESENT (HCC): ICD-10-CM

## 2025-03-24 PROBLEM — F33.1 MDD (MAJOR DEPRESSIVE DISORDER), RECURRENT EPISODE, MODERATE (HCC): Status: RESOLVED | Noted: 2019-11-13 | Resolved: 2025-03-24

## 2025-03-24 PROBLEM — E11.65 UNCONTROLLED TYPE 2 DIABETES MELLITUS WITH HYPERGLYCEMIA (HCC): Status: RESOLVED | Noted: 2022-09-12 | Resolved: 2025-03-24

## 2025-03-24 PROBLEM — N18.30 CKD (CHRONIC KIDNEY DISEASE) STAGE 3, GFR 30-59 ML/MIN (HCC): Chronic | Status: RESOLVED | Noted: 2024-12-23 | Resolved: 2025-03-24

## 2025-03-24 PROBLEM — Z87.19 HISTORY OF SMALL BOWEL OBSTRUCTION: Status: RESOLVED | Noted: 2022-06-17 | Resolved: 2025-03-24

## 2025-03-24 PROBLEM — S42.301A CLOSED FRACTURE OF RIGHT UPPER LIMB: Status: RESOLVED | Noted: 2024-08-09 | Resolved: 2025-03-24

## 2025-03-24 PROCEDURE — 99213 OFFICE O/P EST LOW 20 MIN: CPT

## 2025-03-24 PROCEDURE — 99214 OFFICE O/P EST MOD 30 MIN: CPT

## 2025-03-24 PROCEDURE — 73562 X-RAY EXAM OF KNEE 3: CPT | Performed by: EMERGENCY MEDICINE

## 2025-03-24 RX ORDER — DASIGLUCAGON 0.6 MG/.6ML
INJECTION, SOLUTION SUBCUTANEOUS
COMMUNITY
Start: 2025-02-23

## 2025-03-24 RX ORDER — EZETIMIBE 10 MG/1
10 TABLET ORAL DAILY
Qty: 90 TABLET | Refills: 3 | Status: SHIPPED | OUTPATIENT
Start: 2025-03-24

## 2025-03-24 RX ORDER — TIMOLOL MALEATE 5 MG/ML
1 SOLUTION/ DROPS OPHTHALMIC 2 TIMES DAILY
COMMUNITY
Start: 2024-10-30

## 2025-03-24 RX ORDER — HYDROCODONE BITARTRATE AND ACETAMINOPHEN 5; 325 MG/1; MG/1
1-2 TABLET ORAL EVERY 6 HOURS PRN
Qty: 10 TABLET | Refills: 0 | Status: SHIPPED | OUTPATIENT
Start: 2025-03-24 | End: 2025-03-29

## 2025-03-24 RX ORDER — HYDROCHLOROTHIAZIDE 25 MG/1
25 TABLET ORAL DAILY
COMMUNITY

## 2025-03-24 NOTE — ED PROVIDER NOTES
Patient Seen in: [facility]      History   No chief complaint on file.    Stated Complaint: Knee Inj After Fall    Subjective:   HPI      74-year-old female with a history of hyperlipidemia, diabetes hypertension nephropathy presents to the immediate care for left knee pain.  Patient states that she fell.  She states that she has been having knee pain for some time.  The pain is worse with weightbearing and ambulation.  Denies any distal numbness or tingling.    Objective:     No pertinent past medical history.            No pertinent past surgical history.              No pertinent social history.            Review of Systems    Positive for stated complaint: Knee Inj After Fall  Other systems are as noted in HPI.  Constitutional and vital signs reviewed.      All other systems reviewed and negative except as noted above.    Physical Exam     ED Triage Vitals [03/24/25 1207]   BP (!) 177/83   Pulse 84   Resp 18   Temp 97.9 °F (36.6 °C)   Temp src Oral   SpO2 97 %   O2 Device None (Room air)       Current Vitals:   Vital Signs  BP: (!) 177/83  Pulse: 84  Resp: 18  Temp: 97.9 °F (36.6 °C)  Temp src: Oral    Oxygen Therapy  SpO2: 97 %  O2 Device: None (Room air)        Physical Exam     General: Alert and oriented. No acute distress.  HEENT: Normocephalic. No evidence of trauma. Extraocular movements are intact.  Cardiovascular exam: Regular rate and rhythm  Lungs: Clear to auscultation bilaterally.  Abdomen: Soft, nondistended, nontender.  Extremities: No evidence of deformity. No clubbing or cyanosis.  Left knee: No obvious swelling or effusions noted.  No ecchymosis or bruising is noted.  Neuro: No focal deficit is noted.  ED Course   Labs Reviewed - No data to display  Left knee series was ordered to evaluate for significant DJD.  Patient cannot tolerate NSAIDs because of her chronic kidney disease.  She will be discharged home with [place] for pain.  Left knee x-rays show no acute fracture.  She  has degenerative changes of the knee.      MDM   Patient was screened and evaluated during this visit.   As a treating physician attending to the patient, I determined, within reasonable clinical confidence and prior to discharge, that an emergency medical condition was not or was no longer present.  There was no indication for further evaluation, treatment or admission on an emergency basis.  Comprehensive verbal and written discharge and follow-up instructions were provided to help prevent relapse or worsening.  Patient was instructed to follow-up with her primary care provider for further evaluation and treatment, but to return immediately to the ER for worsening, concerning, new, changing or persisting symptoms.  I discussed the case with the patient and they had no questions, complaints, or concerns.  Patient felt comfortable going home.    ^^Please note that this report has been produced using speech recognition software and may contain errors related to that system including, but not limited to, errors in grammar, punctuation, and spelling, as well as words and phrases that possibly may have been recognized inappropriately.  If there are any questions or concerns, contact the dictating provider for clarification        Medical Decision Making      Disposition and Plan     Clinical Impression:  1. Acute pain of left knee         Disposition:  Discharge  3/24/2025  1:11 pm    Follow-up:  Marga Ferreira MD  [address] [address] [phone]    Call   As needed, If symptoms worsen          Medications Prescribed:  Current Discharge Medication List        START taking these medications    Details   HYDROcodone-acetaminophen 5-325 MG Oral Tab Take 1-2 tablets by mouth every 6 (six) hours as needed.  Qty: 10 tablet, Refills: 0    Associated Diagnoses: Acute pain of left knee                 Supplementary Documentation:

## 2025-03-24 NOTE — PATIENT INSTRUCTIONS
Agregue ezetemiba (zetia) para ayudar a reducir aún más thompson colesterol. Continúe tomando atorvastatina 80 mg todos los días.    Necesita shamir inyección para la osteoporosis llamada Reclast (ácido zolendrónico). Solicitaremos la autorización del seguro y le llamaremos para programar la infusión. Si no recibe noticias en dos semanas, llame al [phone].    No exceda los 3000 mg (3 gramos) de paracetamol en 24 horas.    Es muy importante que revise la cantidad de paracetamol (tylenol) en cualquiera de jesus medicamentos de venta cosme para asegurarse de no exceder shamir cantidad romero de paracetamol al día.    Para jesus huesos, necesita duane 2000 unidades de vitamina D3 de venta cosme todos los días y 600 mg de calcio dos veces al día.    Traiga thompson máquina a thompson próxima visita al consultorio para verificar thompson precisión. Me gusta la noemi OMRON. Conserve el recibo.  Mida thompson presión arterial y pulso diariamente y registre estos valores. Mídase la presión arterial shamir vez al día después de viktoriya descansado pal al menos 5 minutos. Ambos pies deben estar apoyados en el suelo y debe estar sentado con la espalda apoyada. Por favor, infórmeme de thompson presión arterial en 14 días. Recomiendo las siguientes vacunas:  Vacuna TDAP (tétanos, difteria y tos ferina) cada 10 años.    Vacuna Shingrix, shamir veda vez en la megna. Es shamir vacuna de dos pasos que se administra con un intervalo de 2 a 6 meses.    Vacuna contra el VRS para todas las personas mayores de 75 años y para aquellas de 60 a 74 años con enfermedades crónicas del corazón, los pulmones, los riñones y el hígado.    Vacuna anual contra la gripe cada septiembre y octubre.    Manténgase al día con las vacunas contra la COVID-19.    En Ingles  Please add medication named ezetemibe (zetia) to help lower your cholesterol more. Please continue atorvastatin 80 mg every day.     Please do not exceed 3000 mg (3 grams) of tylenol in 24 hours.   It is very important you look at the  amount of tylenol (acetaminophen) in any of your over the counter medications to ensure you do not exceed a safe amount of tylenol per day.      For your bones you need to take over the counter vitamin D3 2000 units every day and calcium 600 mg twice daily.     Please bring your machine into your next office visit to ensure it is accurate. I like the OMRON brand. Please keep the receipt.   Please measure your blood pressure and pulse daily and record these values. Please measure your blood pressure once daily after you have been resting for at least 5 minutes. Both feet should be flat on the ground and you should be seated with your back supported. Please communicate your blood pressures to me in 14 days.       I recommend the following vaccines -  TDAP (tetanus, diptheriae, pertussis) vaccine every 10 years.     Shingrix vaccine once in a lifetime. It is a two step vaccine given 2-6 months apart.     RSV vaccine for everyone over age 75 and those ages 60-74 with chronic heart, lung, kidney and liver conditions.     Annual FLU every September, October.    Stay up to date with COVID vaccines.

## 2025-03-24 NOTE — PROGRESS NOTES
Celeste Mann is a 74 year old female.     HPI:   Patient presents for the following issues and MAS.  ID, Brendon # [id number]  Vaccines - discussed all that are indicated.   DM and overweight - following very closely with diabetic team and her last A1C in 2/2025 improved to 7.1! She thinks this is based off of her CGM. I reviewed her log and she has not had any hypoglycemia in 30 days. She is in range 66% of the time.   HTN - she measures her pressures 2 -3 times/week but they are always \"high.\" She does not recall the values. But she is only checking her sugars when she is anxious and does not feel well.   HLP - LDL is 75. On atorva 80.   Albuminuria in DM - on losartan.   Osteoporosis  - alendronate caused stomach upset so she only took it twice. She did not inform me of this. Discussed reclast.   Goals of care - she is still living with her daughter and estranged . However, it is not overwhelming her with depression.  She is working on the things she can control and accept the things she cannot control. She is independent of her ADLs.   Left knee pain - has had this pain since a fall 7 years ago. Has intermittent pain. She occasionally hears a cracking sound when walking. Does not usually swell. Pain occurs daily at times and then goes away. Usually occurs when walking. Has been worsening and daily lately. She uses otc ointment but does not recall the name.     REVIEW OF SYSTEMS:   GENERAL HEALTH: feels well otherwise. No f/c  NEURO: denies any headaches, LH, dizzyness, LOC, falls  VISION: denies any blurred or double vision  RESPIRATORY: denies shortness of breath, cough, or congestion  CARDIOVASCULAR: denies chest pain, pressure or palpitations  GI: denies abdominal pain, constipation, diarrhea, n/v, BRBPR, melena  : no dysuria or hematuria or vaginal bleeding  SKIN: denies any unusual skin lesions or rashes  PSYCH: mood is as above  EXT: occ swelling in ankles      Wt Readings from Last 6  Encounters:   01/20/25 147 lb 12.8 oz (67 kg)   12/23/24 151 lb (68.5 kg)   11/13/24 150 lb (68 kg)   08/09/24 150 lb 12.8 oz (68.4 kg)   07/15/24 160 lb (72.6 kg)   06/05/24 160 lb (72.6 kg)       Allergies   Allergen Reactions    Penicillins RASH    Jardiance [Empagliflozin] ITCHING     Vaginal itching       Family History   Problem Relation Age of Onset    Cancer Father         stomach cancer    Diabetes Other     Diabetes Son     Diabetes Sister     Diabetes Brother       Past Medical History:    Essential hypertension    High blood pressure    High cholesterol    NAFLD (nonalcoholic fatty liver disease)    Cirrhosis, 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  Gastric Bx:      Social History:    Social History     Socioeconomic History    Marital status:    Tobacco Use    Smoking status: Never    Smokeless tobacco: Never   Vaping Use    Vaping status: Never Used   Substance and Sexual Activity    Alcohol use: Not Currently    Drug use: No   Other Topics Concern    Caffeine Concern Yes    Exercise Yes     Comment: 1-2x per week     Social Drivers of Health     Food Insecurity: No Food Insecurity (6/19/2022)    [facility] [employer] [employer]    Food Insecurity     Within the past 12 months, you worried that your food would run out before you got the money to buy more.: 1     Within the past 12 months, the food you bought just didn't last and you didn't have money to get more.: 1    Transportation Needs: No Transportation Needs (6/19/2022)    Received from [employer]    Transportation Needs     In the past 12 months, has lack of transportation kept you from medical appointments or from getting medications?: 2     In the past 12 months, has lack of transportation kept you from meetings, work, or from getting things needed for daily living?: 2    Received from [employer]    [facility]           EXAM:   /70 (BP Location: Right arm, Patient Position: Sitting, Cuff Size: adult)   Pulse 74   Temp 97.8 °F (36.6 °C) (Temporal)   Resp 14   Ht 5' 1\" (1.549 m)   Wt 144 lb 6.4 oz (65.5 kg)   SpO2 98%   BMI 27.28 kg/m²   GENERAL: A&O well developed, well nourished,in no apparent distress  SKIN: no rashes,no suspicious lesions  HEENT: atraumatic, MMM, throat is clear  NECK: supple, no jvd, no thyromegaly  LUNGS: clear to auscultation bilateraly, no c/w/r  CARDIO: RRR, systolic murmur heard throughout  GI: soft non tender nondistended no hsm bs throughout  NEURO: CN 2-12 grossly intact  PSYCH: pleasant  EXTREMITIES: no cyanosis, clubbing or edema  Bilateral barefoot skin diabetic exam is normal, visualized feet and the appearance is normal.  Bilateral monofilament/sensation of both feet is normal.  Pulsation pedal pulse exam of both lower legs/feet is normal as well.        ASSESSMENT AND PLAN:   # Left Knee Pain - chronic. Check x-ray. Discussed HEP, PT, pain medication, and ortho referral  # Osteoporosis with fracture (2024): did not tolerate alendronate (nauseau and stomach upset). We completed paperwork for reclast today.   - educated on dietary calcium/vit D3 weight bearing exercises   # Vitamin D Deficiency -  needs to resume supplement.   # Insulin Dependent DM2: CGM shows recent A1C of 7.1. she is following closely with DM team and doing very well. Cont toujeo, novolog, ozempic, and metformin.   - extensively counseled on health plant based nutrition  -  DM eye exam in 4/2024 by Hayes Thornton. We have requested more recent exam   - Foot Exam: done 2025   - LDL: see below   - BP: see below  - micro alb:creat - done 2025  - Depression Screen: done 2025   # Albuminuria in Type 2 DM: cont ARB and needs to improve glycemic control.   # Proliferative diabetic retinopathy and clinically significant macular edema b/l: following with ophtho, needs improved glycemic control  # HTN in T2 DM: well controlled on hydrochlorothiazide, hydralazine, and losartan  # HLP assoc with DM: LDL is not at goal on atorva 80 mg, add zetia.  # cirrhosis of liver: detected by liver US in 2014.  Weight loss is paramount. LFTs are normal. Now on GLP-1  # Overweight: applauded on weight loss. Counseled on healthy plant based nutrition, regular exercise, and practicing mindfulness. We outlined small, achievable goals.   # Health Maintenance: medicare supervisit on March, 2025  Colon Cancer Screening: normal colonoscopy on 9/9/2022 by Uche Oneill. Repeat in 10 years pending her health.   Breast Cancer Screening: continue yearly mammogram.   Bone Health/Fall Prevention (Tim Chi): see above   Vaccines: advised on all indicated vaccines  Hep C screening (born [birth year]): Ab neg 2019  Medical POA: daughter, Carmen Mann is primary        Previous PCP - Jessica GOULD - Keagan Gavin        The patient indicates understanding of these issues and agrees to the plan.  The patient is asked to return in 6 months for chronic health issues.   Marga Ferreira MD

## 2025-03-24 NOTE — TELEPHONE ENCOUNTER
FAX RECEIVED REQUESTING LAS 2 CHART NOTES FROM THE PAST 6 MONTHS  AWAITING NOTE FROM TODAY'S VISIT   PLEASE ADVICE

## 2025-03-25 NOTE — TELEPHONE ENCOUNTER
Left message to call back regarding approved/faxed Reclast.    Order faced to [facility]: [phone].

## 2025-03-28 RX ORDER — INSULIN GLARGINE 300 U/ML
INJECTION, SOLUTION SUBCUTANEOUS
Qty: 16 ML | Refills: 0 | Status: SHIPPED | OUTPATIENT
Start: 2025-03-28

## 2025-03-31 ENCOUNTER — HOSPITAL ENCOUNTER (EMERGENCY)
Age: 75
Discharge: HOME | End: 2025-03-31
Attending: EMERGENCY MEDICINE
Payer: MEDICARE

## 2025-03-31 ENCOUNTER — APPOINTMENT (OUTPATIENT)
Dept: CT IMAGING | Age: 75
End: 2025-03-31
Attending: EMERGENCY MEDICINE
Payer: MEDICARE

## 2025-03-31 ENCOUNTER — TELEPHONE (OUTPATIENT)
Dept: INTERNAL MEDICINE CLINIC | Facility: CLINIC | Age: 75
End: 2025-03-31

## 2025-03-31 ENCOUNTER — APPOINTMENT (OUTPATIENT)
Dept: GENERAL RADIOLOGY | Age: 75
End: 2025-03-31
Attending: EMERGENCY MEDICINE
Payer: MEDICARE

## 2025-03-31 VITALS
BODY MASS INDEX: 27.75 KG/M2 | HEIGHT: 61 IN | OXYGEN SATURATION: 97 % | RESPIRATION RATE: 17 BRPM | DIASTOLIC BLOOD PRESSURE: 80 MMHG | HEART RATE: 77 BPM | WEIGHT: 147 LBS | TEMPERATURE: 98 F | SYSTOLIC BLOOD PRESSURE: 168 MMHG

## 2025-03-31 DIAGNOSIS — S09.90XA CLOSED HEAD INJURY, INITIAL ENCOUNTER: Primary | ICD-10-CM

## 2025-03-31 DIAGNOSIS — S01.01XA LACERATION OF SCALP, INITIAL ENCOUNTER: ICD-10-CM

## 2025-03-31 DIAGNOSIS — S06.0X0A CONCUSSION WITHOUT LOSS OF CONSCIOUSNESS, INITIAL ENCOUNTER: ICD-10-CM

## 2025-03-31 DIAGNOSIS — S89.92XA INJURY OF LEFT KNEE, INITIAL ENCOUNTER: ICD-10-CM

## 2025-03-31 PROCEDURE — 99284 EMERGENCY DEPT VISIT MOD MDM: CPT

## 2025-03-31 PROCEDURE — 73562 X-RAY EXAM OF KNEE 3: CPT | Performed by: EMERGENCY MEDICINE

## 2025-03-31 PROCEDURE — 70450 CT HEAD/BRAIN W/O DYE: CPT | Performed by: EMERGENCY MEDICINE

## 2025-03-31 NOTE — DISCHARGE INSTRUCTIONS
For knee can ice, Tylenol as needed.  Can take Tylenol for the headache.  Return if worsening symptoms, new complaints.  Wash area with soap and water followed by Neosporin or bacitracin.  Follow-up with your doctor

## 2025-03-31 NOTE — ED PROVIDER NOTES
Patient Seen in: [facility] [place]      History     Chief Complaint   Patient presents with    Fall    Head Neck Injury     Stated Complaint: fall and hit head this am.  no blood thinners or loc    Subjective:   HPI      Patient is a 74-year-old woman who presents after mechanical fall.  Offered  and preferred her daughter.  This did stare and fell backwards.  Hit the back of her head.  She has a small laceration abrasion occipital scalp.  She has left knee pain.  Minimal neck pain.  No chest pain or shortness of breath.  Is not on blood thinners.  No fevers or chills.  No other specific complaints.  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  Gastric Bx:                Social History     Socioeconomic History    Marital status:    Tobacco Use    Smoking status: Never    Smokeless tobacco: Never   Vaping Use    Vaping status: Never Used   Substance and Sexual Activity    Alcohol use: Not Currently    Drug use: No   Other Topics Concern    Caffeine Concern Yes    Exercise Yes     Comment: 1-2x per week     Social Drivers of Health     Food Insecurity: No Food Insecurity (6/19/2022)    Received from [employer]    Food Insecurity      Within the past 12 months, you worried that your food would run out before you got the money to buy more.: 1     Within the past 12 months, the food you bought just didn't last and you didn't have money to get more.: 1   Transportation Needs: No Transportation Needs (6/19/2022)    Received from [employer]    Transportation Needs     In the past 12 months, has lack of transportation kept you from medical appointments or from getting medications?: 2     In the past 12 months, has lack of transportation kept you from meetings, work, or from getting things needed for daily living?: 2    Received from [employer]    [facility]                  Physical Exam     ED Triage Vitals [03/31/25 1114]   BP (!) 169/80   Pulse 80   Resp 18   Temp 97.7 °F (36.5 °C)   Temp src Oral   SpO2 98 %   O2 Device None (Room air)       Current Vitals:   Vital Signs  BP: (!) 168/80  Pulse: 77  Resp: 17  Temp: 97.7 °F (36.5 °C)  Temp src: Oral    Oxygen Therapy  SpO2: 97 %  O2 Device: None (Room air)        Physical Exam  General: Well-appearing patient of stated age resting comfortably  HEENT: Normocephalic.  There is blood in the occipital scalp dried in the hair.  After a sepsis, there is 1/2 cm laceration with minimal skin separation.  Allow to heal by secondary intention.  No midline cervical spine tenderness.  Nonicteric sclera.  Moist mucous membranes  Lungs: No tachypnea clear to auscultation  Abdomen: Soft nontender  Cardiac: No tachycardia  Abdomen: No tenderness  Skin: No rashes, pallor  Neuro: No focal deficits.  Extremities: Left knee is boggy with tenderness over the patella.  No effusion.  Able to lift leg.    ED Course   Labs Reviewed - No data to display  Left knee x-rays: I personally reviewed the films and my independent interpertaion showed no fracture.  Significant arthritis.  FISH report reviewed    CT head: No acute pathology.  Official report reviewed            MDM      Patient presents  with mechanical fall.  Hit patient's head.  Small laceration occipital scalp.  Minimal skin separation.  Should heal well without intervention.  Discussed this with family comfortable with this.  Patient was sent for head CT to rule out intercerebral hemorrhage, subdural, other.  Head CT is negative for traumatic injury.  Patient also complained of left knee pain.  X-rays performed without fracture, significant osteoarthritis.  Discussed with patient, we discussed wound care including soap and water followed by Neosporin or bacitracin.  Can take 2 extra Tylenol if needed.  Follow-up primary care.  Return if worsening symptoms or new complaints    Wound had a sepsis performed.    MDM    Disposition and Plan     Clinical Impression:  1. Closed head injury, initial encounter    2. Laceration of scalp, initial encounter    3. Injury of left knee, initial encounter    4. Concussion without loss of consciousness, initial encounter         Disposition:  Discharge  3/31/2025 12:05 pm    Follow-up:  Marga Ferreira MD  [address] [id number] [phone]    Follow up in 1 week(s)            Medications Prescribed:  Discharge Medication List as of 3/31/2025 12:11 PM              Supplementary Documentation:

## 2025-03-31 NOTE — ED INITIAL ASSESSMENT (HPI)
Pt here with daughter who is , pt fell this am around 0330 states she tripped on a step, hit back of head on a table, no loc. Pt now c/o pain to lt knee, head pain and dizziness.    Glycopyrrolate Pregnancy And Lactation Text: This medication is Pregnancy Category B and is considered safe during pregnancy. It is unknown if it is excreted breast milk.

## 2025-03-31 NOTE — TELEPHONE ENCOUNTER
Spoke with patient's daughter, Carmen. Pt's daughter states that pt fell at 3:30AM today as she was going down the steps to the bathroom. She missed a step as it was dark, and hit her head on metal table. There is a lesion on the middle of her head towards the back which is bleeding, not gushing blood. Pt has intermittent headache, rates it 8/10. Pt does feel dizzy. No vision changes. No mental changes.    This RN recommended pt go to [facility] to be evaluated for fall as well as bleeding from lesion on head. May need stitches.     TJ: KS pt, OK to send pt to [facility] to be evaluated for fall and hitting head which caused an open lesion on metal table? Please advise, TY!

## 2025-04-02 ENCOUNTER — PATIENT OUTREACH (OUTPATIENT)
Dept: CASE MANAGEMENT | Age: 75
End: 2025-04-02

## 2025-04-02 NOTE — PROGRESS NOTES
Transitions of Care Navigation  Discharge Date: 3/31/25  Contact Date: 2025    Transitions of Care Assessment:  NOHEMI Initial Assessment    General:  Assessment completed with: Patient  Patient Subjective: NCM spoke with patient states is feeling better. Patient reports left knee pain and swelling worse last night, has improved this morning. Patient has applied ice to knee and takes tylenol for pain. Patient is able to get around at home fine. She denies any headaches, blurred vision, fevers, chills, nausea, vomiting, shortness of breath, chest pain or any others. Patient has glucose monitor in place, reports FBS this morning at 77. She denies any questions at this time. She declines any further outreaches at this time.  Chief Complaint: fall  Verify patient name and  with patient/ caregiver: Yes    Hospital Stay/Discharge:  Tell me what you understand of why you were in the hospital or emergency department: Patient reports to ED after a fall.  Prior to leaving the hospital were your Discharge Instructions reviewed with you?: Yes  Did you receive a copy of your written Discharge Instructions?: Yes  What questions do you have about your Discharge Instructions?: none  Do you feel better or worse since you left the hospital or emergency department?: Better    Follow - Up Appointment:  Do you have a follow-up appointment?: Yes  Date: 25  Physician: PCP  Are there any barriers to getting to your follow-up appointment?: No    Home Health/DME:  Prior to leaving the hospital was Home Health (HH) arranged for you?: N/A  Are HH needs identified by staff during the assessment?: No     Prior to leaving the hospital or emergency department was Durable Medical Equipment (DME), medical supplies, or infusions arranged for you?: N/A  Are DME/medical supply/infusions needs identified by staff during this assessment?: No     Medications/Diet:  Did any of your medications change, during or after your hospital stay or ED  visit?: Yes  Do you have your new or updated medications?: Yes  Do you understand what your medications are for and possible side effects?: Yes  Are there any reasons that keep you from taking your medication as prescribed?: No  Any concerns about medication refills?: No    Were you given a different diet per your Discharge Instructions?: No  Reason: n/a     Questions/Concerns:  Do you have any questions or concerns that have not been discussed?: No   NOHEMI Follow-up Assessment    General:  Assessment completed with: Patient  Patient Subjective: NCM spoke with patient states is feeling better. Patient reports left knee pain and swelling worse last night, has improved this morning. Patient has applied ice to knee and takes tylenol for pain. Patient is able to get around at home fine. She denies any headaches, blurred vision, fevers, chills, nausea, vomiting, shortness of breath, chest pain or any others. Patient has glucose monitor in place, reports FBS this morning at 77. She denies any questions at this time. She declines any further outreaches at this time.  Chief Complaint: fall    Nursing Interventions:  All discharge instructions reviewed with the patient. Reviewed when to call MD vs when to call 911 or go the ED. Educated patient on the importance of taking all meds as prescribed as well as close f/u with PCP/specialists. Pt verbalized understanding and will contact the office with any further questions or concerns. Patient denies fevers, chills, nausea, vomiting, shortness of breath, chest pain, or any other symptoms at this time.   NCM provided contact information for any further questions/concerns. Patient verbalized understanding and agreeable.         Medications:  Medication Reconciliation:  I am aware of an inpatient discharge within the last 30 days.  The discharge medication list has been reconciled with the patient's current medication list and reviewed by me. See medication list for additions of new  medication, and changes to current doses of medications and discontinued medications.  Current Outpatient Medications   Medication Sig Dispense Refill    Insulin Glargine, 1 Unit Dial, (TOUJEO SOLOSTAR) 300 UNIT/ML Subcutaneous Solution Pen-injector INJECT 40 UNITS SUBCUTANEOUSLY IN THE MORNING AND 42 UNITS IN THE EVENING 16 mL 0    ZEGALOGUE 0.6 MG/0.6ML Subcutaneous Solution Auto-injector INJECT SUBCUTANEOUSLY AS DIRECTED. SEE ADMIN INSTRUCTIONS FOR 1 DAY. AS NEEDED FOR SEVERE HYPOGLYCEMIA.      timolol 0.5 % Ophthalmic Solution Place 1 drop into the left eye 2 (two) times daily.      hydroCHLOROthiazide 25 MG Oral Tab Take 1 tablet (25 mg total) by mouth daily.      insulin glargine 100 UNIT/ML Subcutaneous Solution Pen-injector Inject 40 Units into the skin nightly.      ezetimibe 10 MG Oral Tab Take 1 tablet (10 mg total) by mouth daily. 90 tablet 3    Continuous Glucose Sensor (FREESTYLE ANA PAULA 3 PLUS SENSOR) Does not apply Misc 1 each by Other route every 14 (fourteen) days. 6 each 0    semaglutide (OZEMPIC, 0.25 OR 0.5 MG/DOSE,) 2 MG/3ML Subcutaneous Solution Pen-injector Inject 0.5 mg into the skin once a week. 9 mL 0    insulin aspart (NOVOLOG FLEXPEN) 100 Units/mL Subcutaneous Solution Pen-injector Uses up to 20 units daily as directed in divided doses 10 mL 0    BD PEN NEEDLE ELENITA 2ND GEN 32G X 4 MM Does not apply Misc 3 injections daily 200 each 3    diclofenac (VOLTAREN) 1 % External Gel Apply 4 g topically every 6 (six) hours as needed. 1 each 2    cholecalciferol 50 MCG (2000 UT) Oral Cap Take 1 capsule (2,000 Units total) by mouth daily.      hydrALAZINE 50 MG Oral Tab Take 1 tablet (50 mg total) by mouth in the morning and 1 tablet (50 mg total) before bedtime. Replaces 25 mg. 180 tablet 1    atorvastatin 80 MG Oral Tab Take 1 tablet (80 mg total) by mouth daily. 90 tablet 3    losartan 100 MG Oral Tab Take 1 tablet (100 mg total) by mouth every evening. Replaces 50 mg 90 tablet 3    metFORMIN HCl  1000 MG Oral Tab Take 1 tablet (1,000 mg total) by mouth 2 (two) times daily with meals. 180 tablet 3    Glucose Blood (BLOOD GLUCOSE TEST) In Vitro Strip Check your blood sugar before breakfast and 2 hours after lunch. Record all your blood sugar readings and bring to your follow-up appt as discussed. 200 strip 3    Lancets 33G Does not apply Misc 1 Application 2 (two) times a day. 200 each 3    Blood Glucose Monitoring Suppl (ACCU-CHEK GUIDE ME) w/Device Does not apply Kit CHECK YOU BLOOD SUGAR BEFORE BREAKFAST AND 2 HOURS AFTER LUNCH. RECORD ALL YOUR BLOOD SUGAR READINGS AND BRING TO YOUR FOLLOW-UP APT AS DISCUSSED      Blood Glucose Monitoring Suppl Does not apply Device Check your blood sugar before breakfast and 2 hours after lunch. Record all your blood sugar readings and bring to your follow-up appt as discussed. 300 each 3    Alcohol Swabs 70 % Does not apply Pads Clean finger prior to checking blood glucose 100 each 3    Glucose Blood (ACCU-CHEK MAME PLUS) In Vitro Strip USE AS DIRECTED TO TEST BLOOD SUGARS 3 (THREE) TIMES DAILY. 300 strip 11    Blood Glucose Calibration (ACCU-CHEK MAME) In Vitro Solution USE AS DIRECTED TO CALIBRATE GLUCOSE MACHINE 1 each 0    BD ULTRA-FINE LANCETS Does not apply Misc Use 3 x daily to inject insulin. Dx E11.69, E78.2 100 each 0    BD ULTRA-FINE LANCETS Does not apply Misc Dx E11.69, E78.2  Check Blood Sugar 3 times daily 100 each 0         Follow-up Appointments:  Your appointments       Date & Time Appointment Department ([place])    Apr 14, 2025 12:00 PM CDT Hospital Follow Up with Marga Ferreira MD [facility], [facility] ([facility])        May 05, 2025 12:30 PM CDT Diabetes Pump follow up with Arleth Morales APRN [facility], [facility] ([facility])        Sep 24, 2025 11:30 AM CDT Exam - Established with Marga Ferreira MD [facility],  [place] ([facility])              [facility], [facility]  [facility] [place] [address]  [facility] [address]  [phone] [facility]  [address] [facility]  [facility] [id number] [phone]            Transitional [facility] Clinic  Was TCC Ordered: No      Primary Care Provider (If no TCC appointment)  Does patient already have a PCP appointment scheduled? Yes  Nurse Care Manager Confirmed PCP office ER Follow-up appointment with patient       Specialist  Does the patient have any other follow-up appointment(s) need to be scheduled? No       Book By Date: 4/7/25                   Detail Level: Detailed Add Intralesional Injection: No Medication Injected: 5-Fluorouracil Total Volume (Ccs): 1 Anesthesia Type: 2% Xylocaine with epinephrine and sodium bicarbonate Anesthesia Volume In Cc: 0.4 Number Of Freeze-Thaw Cycles: 3 freeze-thaw cycles Total Time In Minutes: 3 minutes Additional Information: (Optional): The wound was cleaned, and a dressing was applied.  The patient received detailed post-op instructions. Pre-Procedure: The surgical site was antiseptically prepared. Consent was obtained from the patient. The risks and benefits to therapy were discussed in detail. Specifically, the risks of infection, scarring, bleeding, prolonged wound healing, incomplete removal, allergy to anesthesia, nerve injury and recurrence were addressed. Alternatives to liquid nitrogen, such as ED&C, surgical removal, XRT were also discussed.  Prior to the procedure, the treatment site was clearly identified and confirmed by the patient. All components of Universal Protocol/PAUSE Rule completed. Render Post-Care In The Note: Yes Post-Care Instructions: I reviewed with the patient in detail post-care instructions. Patient is to keep the area dry for 48 hours, and not to engage in any heavy lifting, exercise, or swimming for the next 14 days. Should the patient develop any fevers, chills, bleeding, severe pain patient will contact the office immediately. Bill As A Line Item Or As Units: Line Item

## 2025-04-03 ENCOUNTER — TELEPHONE (OUTPATIENT)
Facility: CLINIC | Age: 75
End: 2025-04-03

## 2025-04-14 ENCOUNTER — OFFICE VISIT (OUTPATIENT)
Dept: INTERNAL MEDICINE CLINIC | Facility: CLINIC | Age: 75
End: 2025-04-14
Payer: MEDICARE

## 2025-04-14 ENCOUNTER — TELEPHONE (OUTPATIENT)
Dept: INTERNAL MEDICINE CLINIC | Facility: CLINIC | Age: 75
End: 2025-04-14

## 2025-04-14 VITALS
HEART RATE: 81 BPM | DIASTOLIC BLOOD PRESSURE: 80 MMHG | HEIGHT: 61 IN | OXYGEN SATURATION: 98 % | SYSTOLIC BLOOD PRESSURE: 160 MMHG | BODY MASS INDEX: 27.07 KG/M2 | WEIGHT: 143.38 LBS | TEMPERATURE: 98 F

## 2025-04-14 DIAGNOSIS — S42.301D CLOSED FRACTURE OF RIGHT UPPER EXTREMITY WITH ROUTINE HEALING, SUBSEQUENT ENCOUNTER: ICD-10-CM

## 2025-04-14 DIAGNOSIS — I15.2 HYPERTENSION ASSOCIATED WITH DIABETES (HCC): ICD-10-CM

## 2025-04-14 DIAGNOSIS — E11.9 INSULIN DEPENDENT TYPE 2 DIABETES MELLITUS (HCC): ICD-10-CM

## 2025-04-14 DIAGNOSIS — R80.9 DIABETES MELLITUS WITH ALBUMINURIA (HCC): ICD-10-CM

## 2025-04-14 DIAGNOSIS — E11.69 HYPERLIPIDEMIA ASSOCIATED WITH TYPE 2 DIABETES MELLITUS (HCC): ICD-10-CM

## 2025-04-14 DIAGNOSIS — Z79.4 INSULIN DEPENDENT TYPE 2 DIABETES MELLITUS (HCC): ICD-10-CM

## 2025-04-14 DIAGNOSIS — E11.65 UNCONTROLLED TYPE 2 DIABETES MELLITUS WITH HYPERGLYCEMIA (HCC): ICD-10-CM

## 2025-04-14 DIAGNOSIS — M17.12 PRIMARY OSTEOARTHRITIS OF LEFT KNEE: Primary | ICD-10-CM

## 2025-04-14 DIAGNOSIS — E78.5 HYPERLIPIDEMIA ASSOCIATED WITH TYPE 2 DIABETES MELLITUS (HCC): ICD-10-CM

## 2025-04-14 DIAGNOSIS — E11.59 HYPERTENSION ASSOCIATED WITH DIABETES (HCC): ICD-10-CM

## 2025-04-14 DIAGNOSIS — E11.29 DIABETES MELLITUS WITH ALBUMINURIA (HCC): ICD-10-CM

## 2025-04-14 PROCEDURE — G2211 COMPLEX E/M VISIT ADD ON: HCPCS | Performed by: INTERNAL MEDICINE

## 2025-04-14 PROCEDURE — 3077F SYST BP >= 140 MM HG: CPT | Performed by: INTERNAL MEDICINE

## 2025-04-14 PROCEDURE — 3008F BODY MASS INDEX DOCD: CPT | Performed by: INTERNAL MEDICINE

## 2025-04-14 PROCEDURE — 3079F DIAST BP 80-89 MM HG: CPT | Performed by: INTERNAL MEDICINE

## 2025-04-14 PROCEDURE — 1159F MED LIST DOCD IN RCRD: CPT | Performed by: INTERNAL MEDICINE

## 2025-04-14 PROCEDURE — 99214 OFFICE O/P EST MOD 30 MIN: CPT | Performed by: INTERNAL MEDICINE

## 2025-04-14 PROCEDURE — 1160F RVW MEDS BY RX/DR IN RCRD: CPT | Performed by: INTERNAL MEDICINE

## 2025-04-14 RX ORDER — HYDRALAZINE HYDROCHLORIDE 50 MG/1
50 TABLET, FILM COATED ORAL 2 TIMES DAILY
Qty: 180 TABLET | Refills: 3 | Status: SHIPPED | OUTPATIENT
Start: 2025-04-14

## 2025-04-14 RX ORDER — HYDROCHLOROTHIAZIDE 25 MG/1
25 TABLET ORAL DAILY
Qty: 90 TABLET | Refills: 3 | Status: SHIPPED | OUTPATIENT
Start: 2025-04-14

## 2025-04-14 NOTE — PROGRESS NOTES
Celeste Mann is a 74 year old female.     HPI:   Patient presents for the following issues.  ID # [id number], [place]. Comes in alone.   Osteoporosis with fracture in 2024 - I sent paperwork for reclast on 3/24/2025.  Falls - she has fallen twice in past 3 weeks. She fell on 3/24 and 3/31. She hit her head on 3/31 and presented to ED. She does not think these were due to her left knee pain. She fell most recently because she got up in middle of night to use bathroom in a dark room. Her daughter heard her but she was able to get up himself. Today, her knee pain and arm pain is improving slowly. No headaches.   Left knee pain - it was very swollen after her fall on 3/31. She has been icing it. It is improving.  She was riding recumbent ibke prior to fall. She was also performing HEP until her fall and knee pain worsened. She does plan to resume the HEP. She has been using a cane to ambulate but she notices her left knee gives out on her enexpectadly. She does have a walker.   Vitamin D deficiency - she has resumed D3 supplement.   IDDM - no hypoglycemia in past 1 month. I reviewed her cgm monitor.   HTN - uncontrolled. She does not think she is taking hydralazine or hydrochlorothiazide. She does not think she has received refills from the pharmacy. Upon medication review this does seem correct.     REVIEW OF SYSTEMS:   GENERAL HEALTH: feels well otherwise. No f/c  NEURO: denies any headaches, LH, dizzyness, LOC  VISION: denies any blurred or double vision  RESPIRATORY: denies shortness of breath, cough, or congestion  CARDIOVASCULAR: denies chest pain, pressure or palpitations  GI: denies abdominal pain, constipation, diarrhea, n/v, BRBPR, melena  : no dysuria or hematuria  EXT: denies edema      Wt Readings from Last 6 Encounters:   03/31/25 147 lb (66.7 kg)   03/24/25 144 lb 6.4 oz (65.5 kg)   01/20/25 147 lb 12.8 oz (67 kg)   12/23/24 151 lb (68.5 kg)   11/13/24 150 lb (68 kg)   08/09/24 150 lb 12.8 oz (68.4  kg)       Allergies   Allergen Reactions    Penicillins RASH and ANGIOEDEMA     Product containing penicillin (product)    Jardiance [Empagliflozin] ITCHING     Vaginal itching       Family History   Problem Relation Age of Onset    Cancer Father         stomach cancer    Diabetes Other     Diabetes Son     Diabetes Sister     Diabetes Brother       Past Medical History:    Essential hypertension    High blood pressure    High cholesterol    NAFLD (nonalcoholic fatty liver disease)    Cirrhosis, 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  Gastric Bx:      Social History:    Social History     Socioeconomic History    Marital status:    Tobacco Use    Smoking status: Never    Smokeless tobacco: Never   Vaping Use    Vaping status: Never Used   Substance and Sexual Activity    Alcohol use: Not Currently    Drug use: No   Other Topics Concern    Caffeine Concern Yes    Exercise Yes     Comment: 1-2x per week     Social Drivers of Health     Food Insecurity: No Food Insecurity (6/19/2022)    Received from [employer]    Food Insecurity     Within the past 12 months, you worried that your food would run out before you got the money to buy more.: 1     Within the past 12 months, the food you bought just didn't last and you didn't have money to get more.: 1   Transportation Needs: No Transportation Needs (6/19/2022)    Received from [employer]    Transportation Needs     In the past 12 months, has lack  of transportation kept you from medical appointments or from getting medications?: 2     In the past 12 months, has lack of transportation kept you from meetings, work, or from getting things needed for daily living?: 2    [facility]           EXAM:   /80 (BP Location: Right arm)   Pulse 81   Temp 97.9 °F (36.6 °C) (Temporal)   Ht 5' 1\" (1.549 m)   Wt 143 lb 6.4 oz (65 kg)   SpO2 98%   BMI 27.10 kg/m²   GENERAL: A&O well developed, well nourished,in no apparent distress  SKIN: no rashes,no suspicious lesions  HEENT: atraumatic, MMM, throat is clear  NECK: supple, no jvd, no thyromegaly  LUNGS: clear to auscultation bilateraly, no c/w/r  CARDIO: RRR without g/m/r  GI: soft non tender nondistended no hsm bs throughout  NEURO: CN 2-12 grossly intact  PSYCH: pleasant  EXTREMITIES: no cyanosis, clubbing or edema  Left knee - not warm but it is slightly swollen.       ASSESSMENT AND PLAN:   # Left Knee OA and Pain and swelling - declines PT, willing to do HEP. Ortho referral printed.   # Falls - she has fallen twice in past 3 weeks and her left knee is giving out often. She needs to use a walker until she strengthens her left knee.   # HTN in T2 DM: uncontrolled. She does not think she has hydralazine and hydrochlorothiazide at home. She is taking losartan. Medications refilled and she will monitor home pressures once resuming.   # Osteoporosis with fracture (2024): did not tolerate alendronate (nauseau and stomach upset). We reached out again to infusion center. We completed relcast paperwork on 3/24/2025.   - educated on dietary calcium/vit D3 weight bearing exercises   # Vitamin D Deficiency -  needs to resume supplement.   # Insulin Dependent DM2: CGM shows recent A1C of 7.1. she is following closely with DM team and doing very well. Cont toujeo, novolog, ozempic, and metformin.   - extensively counseled on health plant based nutrition  - DM eye exam in 4/2024 by Hayes Redmond  [place]. We have requested more recent exam   - Foot Exam: done 2025   - LDL: see below   - BP: see below  - micro alb:creat - done 2025  - Depression Screen: done 2025   # Albuminuria in Type 2 DM: cont ARB and needs to improve glycemic control.   # Proliferative diabetic retinopathy and clinically significant macular edema b/l: following with ophtho, needs improved glycemic control  # HLP assoc with DM: LDL is not at goal on atorva 80 mg, add zetia.  # cirrhosis of liver: detected by liver US in 2014.  Weight loss is paramount. LFTs are normal. Now on GLP-1  # Overweight: applauded on weight loss. Counseled on healthy plant based nutrition, regular exercise, and practicing mindfulness. We outlined small, achievable goals.   # Health Maintenance: medicare supervisit on March, 2025  Colon Cancer Screening: normal colonoscopy on 9/9/2022 by Uche Oneill. Repeat in 10 years pending her health.   Breast Cancer Screening: continue yearly mammogram.   Bone Health/Fall Prevention (Tim Chi): see above   Vaccines: advised on all indicated vaccines  Hep C screening (born [id number]): Ab neg 2019  Medical POA: daughter, Carmen Mann is primary        Previous PCP - Jessica GOULD - Keagan Gavin        The patient indicates understanding of these issues and agrees to the plan.  The patient is asked to return in 6 months for chronic health issues.   Marga Ferreira MD

## 2025-04-14 NOTE — TELEPHONE ENCOUNTER
Spoke with Julia at [facility] infusion center. Asking about following up with pt to schedule infusion. Stated they have not yet received the order. Refaxed order and informed Dr. Ferreira. Julia stated they will reach out to the patient once it is received.

## 2025-04-14 NOTE — PATIENT INSTRUCTIONS
Llamaremos hoy al centro de infusión para hablar sobre thompson Reclast (inyección ósea). Si no tiene noticias suyas para finales de esta semana, por favor, llámenos la próxima semana.    Debe duane los siguientes medicamentos para la hipertensión:  Hidroclorotiazida 25 mg todas las mañanas  Hidralazina 50 mg, shamir tableta por la mañana y otra por la noche  Losartán 100 mg todas las noches    [place]  We are going to call the infusion center today about your reclast (bone injection). If you do not hear from anyone by the end of this week, please call us next week.     You should be taking the following medications for your high blood pressure -  Hydrochlorothiazide 25 mg every morning  Hydralazine 50 mg one tablet in the morning and one tablet in the evening  Losartan 100 mg every evening

## 2025-04-15 ENCOUNTER — TELEPHONE (OUTPATIENT)
Age: 75
End: 2025-04-15

## 2025-04-15 RX ORDER — ZOLEDRONIC ACID 0.05 MG/ML
5 INJECTION, SOLUTION INTRAVENOUS ONCE
OUTPATIENT
Start: 2025-04-15

## 2025-04-25 ENCOUNTER — OFFICE VISIT (OUTPATIENT)
Age: 75
End: 2025-04-25
Attending: INTERNAL MEDICINE
Payer: MEDICARE

## 2025-04-25 VITALS
SYSTOLIC BLOOD PRESSURE: 171 MMHG | HEART RATE: 81 BPM | BODY MASS INDEX: 27 KG/M2 | TEMPERATURE: 98 F | WEIGHT: 142.13 LBS | DIASTOLIC BLOOD PRESSURE: 77 MMHG | RESPIRATION RATE: 18 BRPM | OXYGEN SATURATION: 97 %

## 2025-04-25 DIAGNOSIS — M80.00XD AGE-RELATED OSTEOPOROSIS WITH CURRENT PATHOLOGICAL FRACTURE WITH ROUTINE HEALING: Primary | ICD-10-CM

## 2025-04-25 LAB
ALBUMIN SERPL-MCNC: 4.8 G/DL (ref 3.2–4.8)
CALCIUM BLD-MCNC: 10.4 MG/DL (ref 8.7–10.6)
CREAT BLD-MCNC: 0.87 MG/DL (ref 0.55–1.02)
EGFRCR SERPLBLD CKD-EPI 2021: 70 ML/MIN/1.73M2 (ref 60–?)

## 2025-04-25 RX ORDER — ZOLEDRONIC ACID 0.05 MG/ML
5 INJECTION, SOLUTION INTRAVENOUS ONCE
Status: CANCELLED | OUTPATIENT
Start: 2025-04-25

## 2025-04-25 RX ORDER — ZOLEDRONIC ACID 0.05 MG/ML
5 INJECTION, SOLUTION INTRAVENOUS ONCE
Status: COMPLETED | OUTPATIENT
Start: 2025-04-25 | End: 2025-04-25

## 2025-04-25 RX ADMIN — ZOLEDRONIC ACID 5 MG: 0.05 INJECTION, SOLUTION INTRAVENOUS at 13:49:00

## 2025-04-25 NOTE — PROGRESS NOTES
Education Record    Learner:  Patient    Pt here for: IV Reclast    Barriers / Limitations:  None    Method:  Brief focused, printed material and  reinforcement    General Topics:  Plan of care reviewed    Outcome: Pt tolerated infusion with no c/o.

## 2025-04-30 ENCOUNTER — TELEPHONE (OUTPATIENT)
Facility: CLINIC | Age: 75
End: 2025-04-30

## 2025-04-30 NOTE — TELEPHONE ENCOUNTER
Patient called in to confirm upcoming appointment time. Contacted language line for translation.  Confirmed upcoming appointment.    Future Appointments   Date Time Provider Department Center   5/5/2025 12:30 PM Arleth Morales APRN [facility]   9/24/2025 11:30 AM Marga Ferreira MD [facility] [place]     Last A1c value was 9% done 12/23/2024.

## 2025-05-05 ENCOUNTER — OFFICE VISIT (OUTPATIENT)
Facility: CLINIC | Age: 75
End: 2025-05-05
Payer: MEDICARE

## 2025-05-05 VITALS
WEIGHT: 140.81 LBS | RESPIRATION RATE: 16 BRPM | HEART RATE: 73 BPM | BODY MASS INDEX: 26.58 KG/M2 | SYSTOLIC BLOOD PRESSURE: 136 MMHG | HEIGHT: 61 IN | OXYGEN SATURATION: 97 % | DIASTOLIC BLOOD PRESSURE: 62 MMHG

## 2025-05-05 DIAGNOSIS — E11.69 HYPERLIPIDEMIA ASSOCIATED WITH TYPE 2 DIABETES MELLITUS (HCC): ICD-10-CM

## 2025-05-05 DIAGNOSIS — E11.65 TYPE 2 DIABETES MELLITUS WITH HYPERGLYCEMIA, WITH LONG-TERM CURRENT USE OF INSULIN (HCC): Primary | ICD-10-CM

## 2025-05-05 DIAGNOSIS — E11.59 HYPERTENSION ASSOCIATED WITH DIABETES (HCC): ICD-10-CM

## 2025-05-05 DIAGNOSIS — E78.5 HYPERLIPIDEMIA ASSOCIATED WITH TYPE 2 DIABETES MELLITUS (HCC): ICD-10-CM

## 2025-05-05 DIAGNOSIS — I15.2 HYPERTENSION ASSOCIATED WITH DIABETES (HCC): ICD-10-CM

## 2025-05-05 DIAGNOSIS — M54.2 POSTERIOR NECK PAIN: ICD-10-CM

## 2025-05-05 DIAGNOSIS — Z79.4 TYPE 2 DIABETES MELLITUS WITH HYPERGLYCEMIA, WITH LONG-TERM CURRENT USE OF INSULIN (HCC): Primary | ICD-10-CM

## 2025-05-05 LAB — HEMOGLOBIN A1C: 7 % (ref 4.3–5.6)

## 2025-05-05 NOTE — PATIENT INSTRUCTIONS
Compruebe si está tomando glimepirida 4 mg (1 comprimido)    Continuar:  Metfromin Hcl 1000 mg (1 comprimido) dos veces al día    Toujeo U (300 U) (40 unidades)    Continuar con Novolog 100 U (dosis de 2 unidades por comida)  Dosis  Si la glucemia es inferior a 200 mg/dl, anadir 1 unidades  Si la glucemia es de 201-250 mg/dl, añadir 2 unidades  Si la glucemia es de 251-300 mg/dl, añadir 3 unidades  Si la glucemia es de 301-350 mg/dl, añadir 4 unidades  Si la glucemia es > 350, añadir 5 unidades    Continuar con Ozempic 0.5 mg semanalmente    Para hipoglucemia de emergencia:  Rx Dasiglucagón ([place])    Check if you are taking Glimepiride 4 mg 1 tab - please call me    Continue:  Metfromin Hcl 1000 mg 1 tab twice a day    Toujeo U U300 40 units    Continue Novolog 100U dose 2 units for each meal  Dose   If blood sugar below 200 mg/dl - + 1 unit  If blood sugar is 201-250 mg/dl - + 2  If blood sugar is 251-300 mg/dl -  + 3  If blood sugar is 301-350 mg/dl - + 4  If blood sugar > 350 add 5    Continue Ozempic 0.5 mg weekly    For emergency hypoglycemia:  Rx Dasiglucagon ([place])     Plan de seguimiento:  Con Arleth Morales, APRN: En 3 months    Medicamentos para la diabetes      Paciente que no gregoria: Notificado el 11/3/2025    Número de teléfono de la oficina: [phone]; los teléfonos están abiertos de lunes a viernes de 8:00 a 16:00  Anjelica por visitar nuestra oficina. Esperamos trabajar con usted para alcanzar jesus objetivos de angela. Yamilka recordatorio, si necesita reposiciones, solicítelas con anticipación para que haya tiempo suficiente para procesar la solicitud. Le pedimos que nos notifique con al menos 5 días de anticipación a la fecha de vencimiento de shamir reposición, para que haya tiempo de enviar shamir solicitud a la farmacia, procesar la reposición y asegurarse de que no haya otros problemas para obtener el medicamento (pedidos pendientes, trámites de autorización previa, etc.). Las reposiciones de  rutina no se atenderán los fines de semana, por lo que le solicitamos que envíe estas solicitudes pal la semana.    [facility] [employer] recomienda los siguientes objetivos de azúcar en michelle:     Objetivo de azúcar en michelle en ayunas (antes del desayuno):  (idealmente menos de 110)  2 horas después de comer menos de 180 (idealmente menos de 150)   Objetivo de A1c <7,0 %    Llame si tiene niveles de azúcar en michelle superiores a 180 más de 2 veces por semana  El objetivo de tiempo en rango es superior al 70 % si usa un monitor de glucosa continuo (Dexcom o [place]ander).  El tiempo en rango se puede encontrar en la aplicación Dexcom G7, en Clarity si usa Dexcom G6 o en LibreView si usa [place].    Continúe con la modificación del estilo de megan y elija comidas más saludables.  Realice actividad/ejercicio de manera regular. Continúe con los ejercicios de fortalecimiento con fisioterapia.  Monitoreo frecuente de la glucosa en michelle, sari loren 3 veces al día, en un momento aleatorio antes de las comidas o 2 horas después de shamir comida.  Fortalezca la anegla intestinal; puede consumir yogur, chucrut y probióticos, shea tenga en cuenta el contenido de azúcar y carbohidratos.   Incluya en thompson rutina actividades que le permitan liberar el estrés, sari yoga, natación y estiramientos, según lo tolere, que ayudarán a controlar la glucosa.    CÓMO TRATAR UN NIVEL BAJO DE AZÚCAR EN LA IMCHELLE (hipoglucemia)  Nivel bajo de azúcar en la michelle = menos de 70, o si comienza a tener síntomas (a continuación)  Síntomas: temblores, frecuencia cardíaca acelerada, hambre extrema, sudoración, confusión/dificultad para concentrarse, mareos.    Cómo tratar un nivel bajo de azúcar en la michelle si puede comer/beber: la roe del 15  1. Si está usando un monitor continuo de glucosa que indica que tiene un nivel bajo, shea no tiene ningún síntoma, verifique con shamir punción en el dedo que thompson nivel de azúcar en la michelle  sea realmente bajo antes de tratarlo.   2. Coma 15 gramos de carbohidratos (zora los ejemplos a continuación)  3. Controle thompson nivel de azúcar en la michelle después de 15 minutos. Si sigue estando por debajo de thompson rango objetivo, tome otra porción.   4. Repita estos pasos hasta que esté dentro de thompson rango objetivo. Shamir vez que esté dentro del rango, si está nervioso por si thompson nivel de azúcar baja nuevamente, consuma shamir chasity de proteína (es decir, shamir cucharada de mantequilla de maní).   5. Si tiene un MCG, debe comprobar cómo ha cambiado la christina. Si la christina apunta hacia arriba o hacia un lado después de 15 minutos, espere más tiempo para que el MCG se controle O realice la prueba con shamir punción en el dedo. Intente no comer más alimentos hasta al menos 15 minutos después de la primera medición de glucosa en michelle; de lo contrario, corre el riesgo de tener un aumento repentino de la glucosa en michelle.  6. Si tiene síntomas y no puede controlar thompson glucosa en michelle por algún motivo, trate la hipoglucemia.  7. Si alguien tiene un nivel bajo de azúcar en michelle y está inconsciente: no dude en llamar al 911. Si alguien está inconsciente y no hay glucagón disponible o alguien no sabe cómo usarlo, llame al 911 de inmediato.    Para tratar un nivel bajo de azúcar en michelle, le recomiendo que lleve consigo un tratamiento fácil y pre-porcionado para niveles bajos de azúcar en michelle que consiste en 15 g de carbohidratos:   - Puré de manzana en bolsitas para niños (alto contenido de fibra y carbohidratos que ayudan a prevenir un ezekiel demasiado alto)  - Cajita de jugo para niños pequeños: 15 g de carbohidratos --> Cajita de jugo de 4 oz  - Tabletas de glucosa de [facility], puede encontrarlas cerca de suministros para la diabetes --> Tenga en cuenta que deberá comer de 3 a 4 tabletas para llegar a 15 g de carbohidratos  - Paquete de refrigerio de fruta para niños: busque matt con 15 gramos de carbohidratos totales  - La  elección de cómo tratar thompson nivel bajo es importante. Los carbohidratos complejos, o los alimentos que contienen grasas junto con carbohidratos (sari el chocolate) pueden retardar la absorción de glucosa y NO deben usarse para tratar un nivel bajo de emergencia

## 2025-05-05 NOTE — PROGRESS NOTES
Name: Celeste Mann  YOB: 1950  Report Period: 04/22/2025 - 05/05/2025 (14 days)  Generated: 05/05/2025  Time CGM Active: 97%      Glucose Statistics and Targets  Average Glucose: 175 mg/dL  Glucose Management Indicator (GMI): 7.5%  Glucose Variability (%CV): 38.3%  Target Range: 70 - 180 mg/dL      Time in Ranges  Very High: >250 mg/dL --- 13%  High: 181 - 250 mg/dL --- 25%  Target Range: 70 - 180 mg/dL --- 62%  Low: 54 - 69 mg/dL --- 0%  Very Low: <54 mg/dL --- 0%

## 2025-05-05 NOTE — PROGRESS NOTES
Celeste Mann is a 74 year old presenting today to establish for diabetes management.   Primary care physician: Marga Ferreira MD  Today's A1C 7.0%  ( last A1C  9.0% 12/23/24 )   Pt speaks [place]ayan only and is accompanied by ex-son-in-law.  Language line solution - Naomi [id number]    From last OV, pt has difficulty understanding instructions on medications. Several phone calls made for medication adjustment and insurance coverage. Currently states that she is taking all medications for DM. When further verified, she said that she was instructed to go down to 0.25 mg fr 0.5 mg. She has been on 0.25 mg for 3 doses. She is also unsure if she is taking Glimepiride. Admits not dosing Novolg consistently and mostly dose when she have 200 - 300 mg 2-3 hours after eating.        Reviewed CGM report/trends with patient today (allyve 3+ full download in media)  Findings:  Average glucose: 175 mg/dl (last OV )174 mg/dl  eGMI: 7.5% (last OV )  TIR 63% (last OV: 46%)  Hypoglycemia: No ( previously has 70's)  Variability: 38.5%   Post-prandial elevation extending 4-5 hours  Doing prandial insulin dose after eating     Currently complaining of neck pain, occipital headaches and difficulty moving head to the right side.   Patient had frequent, mechanical fall. Fell twice in March and sustained head injury. She got up to the bathroom without turning on the lights. She also had a mechanical fall in October 2024.   Lives at home with daughter but dtr is not involved in her medical care.   Independent with most self-care and ADLs but unable to drive to appointments, and asks help from ex-son in law.    No recent medical history change, antibiotic or steroid use.    On Patient assistance program for Toujeo until 12/31/2025    Diabetes History:  Type 2 ~ 1990's  Seen in diabetes ctr 2020 by Laurie but was lost to follow-up  Previous A1c -   2024 - 9.0% on 12/23/24, 9.5% on 8/9/2024 , 9.8% on 8/19/24, 9.8 on 5/8/24, 10.1% 2/2/2024, 10.1% on  8/17/23, 9.5% on 2/2723, 8.7% on 9/12/2022, 9.2% on 5/25/2022, 10% on 2/17/22 2021 - 10.8, 9.4  2020 - 9.8, 11.1,  2019 - 8.7    Patient has not had hospitalizations for blood sugar issues  denies any history of pancreatitis    Previous DM therapies:  Januvia - cost 2019  Victoza - 2019 - never started  Jardiance - itching/yeast infection    Current DM Regimen:  Toujeo U300 - 42 units daily -> dosing 40 units daily  Metformin 1000 mg once daily -> tolerating 1 tab twice a day  Ozempic 0.5 mg weekly - went back to 0.25 mg in the past 3 weeks      HGBA1C:    Lab Results   Component Value Date    A1C 7.0 (A) 05/05/2025    A1C 9.0 (A) 12/23/2024    A1C 9.8 (H) 08/09/2024     (H) 08/09/2024       Lab Results   Component Value Date    CHOLEST 168 02/05/2025    CHOLEST 170 02/02/2024    TRIG 247 (H) 02/05/2025    TRIG 147 02/02/2024    HDL 51 02/05/2025    HDL 70 (H) 02/02/2024    LDL 77 02/05/2025    LDL 75 02/02/2024     Lab Results   Component Value Date    MICROALBCREA 713.0 (H) 01/20/2025    MICROALBCREA 695.0 (H) 02/05/2024      Lab Results   Component Value Date    CREATSERUM 0.87 04/25/2025    CREATSERUM 1.59 (H) 02/05/2025    EGFRCR 70 04/25/2025    EGFRCR 34 (L) 02/05/2025     Lab Results   Component Value Date    AST 35 (H) 08/09/2024    AST 23 02/02/2024    ALT 23 08/09/2024    ALT 29 02/02/2024       Lab Results   Component Value Date    TSH 3.420 02/17/2022    TSH 2.720 01/28/2019    T4F 1.0 01/28/2019       DM Complications:  Microvascular:   Neuropathy: no  Retinopathy: yes  4/3/24, mild nonprolif DR, both eyes, mild mac edema OS  Nephropathy: yes    Macrovascular:  PVD: no  CAD: no  Stroke/CVA: no      Modifying factors:  Medication adherence: See hx. 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  Gastric Bx:     Social History     Socioeconomic History    Marital status:    Tobacco Use    Smoking status: Never    Smokeless tobacco: Never   Vaping Use    Vaping status: Never Used   Substance and Sexual Activity    Alcohol use: Not Currently    Drug use: No   Other Topics Concern    Caffeine Concern Yes    Exercise Yes     Comment: 1-2x per week     Social Drivers of Health     Food Insecurity: No Food Insecurity (6/19/2022)    Received from [facility]    Food Insecurity     Within the past 12 months, you worried that your food would run out before you got the money to buy more.: 1     Within the past 12 months, the food you bought just didn't last and you didn't have money to get more.: 1   Transportation Needs: No Transportation Needs (6/19/2022)    Received from [facility]    Transportation Needs     In the past 12 months, has lack of transportation kept you from medical appointments or from getting medications?: 2     In the past 12 months, has lack of transportation kept you from meetings, work, or from getting things needed for daily living?: 2    Received from [facility]    [facility]     Family History   Problem Relation Age of Onset    Cancer Father         stomach cancer    Diabetes Other     Diabetes Son     Diabetes Sister     Diabetes  Brother      Current Medication List:   Current Outpatient Medications   Medication Sig Dispense Refill    hydroCHLOROthiazide 25 MG Oral Tab Take 1 tablet (25 mg total) by mouth daily. 90 tablet 3    insulin glargine 100 UNIT/ML Subcutaneous Solution Pen-injector Inject 40 Units into the skin nightly.      Continuous Glucose Sensor (FREESTYLE ANA PAULA 3 PLUS SENSOR) Does not apply Misc 1 each by Other route every 14 (fourteen) days. 6 each 0    diclofenac (VOLTAREN) 1 % External Gel Apply 4 g topically every 6 (six) hours as needed. 1 each 2    cholecalciferol 50 MCG (2000 UT) Oral Cap Take 1 capsule (2,000 Units total) by mouth daily.      atorvastatin 80 MG Oral Tab Take 1 tablet (80 mg total) by mouth daily. 90 tablet 3    metFORMIN HCl 1000 MG Oral Tab Take 1 tablet (1,000 mg total) by mouth 2 (two) times daily with meals. 180 tablet 3    hydrALAZINE 50 MG Oral Tab Take 1 tablet (50 mg total) by mouth in the morning and 1 tablet (50 mg total) before bedtime. Replaces 25 mg. 180 tablet 3    Insulin Glargine, 1 Unit Dial, (TOUJEO SOLOSTAR) 300 UNIT/ML Subcutaneous Solution Pen-injector INJECT 40 UNITS SUBCUTANEOUSLY IN THE MORNING AND 42 UNITS IN THE EVENING 16 mL 0    ZEGALOGUE 0.6 MG/0.6ML Subcutaneous Solution Auto-injector INJECT SUBCUTANEOUSLY AS DIRECTED. SEE ADMIN INSTRUCTIONS FOR 1 DAY. AS NEEDED FOR SEVERE HYPOGLYCEMIA.      timolol 0.5 % Ophthalmic Solution Place 1 drop into the left eye 2 (two) times daily.      ezetimibe 10 MG Oral Tab Take 1 tablet (10 mg total) by mouth daily. 90 tablet 3    semaglutide (OZEMPIC, 0.25 OR 0.5 MG/DOSE,) 2 MG/3ML Subcutaneous Solution Pen-injector Inject 0.5 mg into the skin once a week. 9 mL 0    insulin aspart (NOVOLOG FLEXPEN) 100 Units/mL Subcutaneous Solution Pen-injector Uses up to 20 units daily as directed in divided doses 10 mL 0    BD PEN NEEDLE ELENITA 2ND GEN 32G X 4 MM Does not apply Misc 3 injections daily 200 each 3    losartan 100 MG Oral Tab Take 1 tablet  (100 mg total) by mouth every evening. Replaces 50 mg 90 tablet 3    Glucose Blood (BLOOD GLUCOSE TEST) In Vitro Strip Check your blood sugar before breakfast and 2 hours after lunch. Record all your blood sugar readings and bring to your follow-up appt as discussed. 200 strip 3    Lancets 33G Does not apply Misc 1 Application 2 (two) times a day. 200 each 3    Blood Glucose Monitoring Suppl (ACCU-CHEK GUIDE ME) w/Device Does not apply Kit CHECK YOU BLOOD SUGAR BEFORE BREAKFAST AND 2 HOURS AFTER LUNCH. RECORD ALL YOUR BLOOD SUGAR READINGS AND BRING TO YOUR FOLLOW-UP APT AS DISCUSSED      Blood Glucose Monitoring Suppl Does not apply Device Check your blood sugar before breakfast and 2 hours after lunch. Record all your blood sugar readings and bring to your follow-up appt as discussed. 300 each 3    Alcohol Swabs 70 % Does not apply Pads Clean finger prior to checking blood glucose 100 each 3    Glucose Blood (ACCU-CHEK MAME PLUS) In Vitro Strip USE AS DIRECTED TO TEST BLOOD SUGARS 3 (THREE) TIMES DAILY. 300 strip 11    Blood Glucose Calibration (ACCU-CHEK MAME) In Vitro Solution USE AS DIRECTED TO CALIBRATE GLUCOSE MACHINE 1 each 0    BD ULTRA-FINE LANCETS Does not apply Misc Use 3 x daily to inject insulin. Dx E11.69, E78.2 100 each 0    BD ULTRA-FINE LANCETS Does not apply Misc Dx E11.69, E78.2  Check Blood Sugar 3 times daily 100 each 0     DM associated review of  symptoms:   Endocrine: Polyuria, polyphagia, polydipsia: no  Neurological: Paresthesias: no  HEENT: Blurred vision: (+) blurry, chronic  Skin: no rash or wounds  Hematological: Hypoglycemia: yes; see sensor     Review of Systems     No change in vision, denies frontal headaches  LUNGS: denies shortness of breath   CARDIOVASCULAR: denies chest pain  GI: denies abdominal pain, nausea or diarrhea   : denies dysuria  MUSCULOSKELETAL: (+) rt shoulder pain - chronic, limited ROM  (+) neck and left sided-occipital pain, limited left side head  turning    Physical exam:  /62 (BP Location: Right arm, Patient Position: Sitting, Cuff Size: adult)   Pulse 73   Resp 16   Ht 5' 1\" (1.549 m)   Wt 140 lb 12.8 oz (63.9 kg)   SpO2 97%   BMI 26.60 kg/m²   Body mass index is 26.6 kg/m².    Physical Exam   Vitals reviewed.   Constitutional: Normal appearance but with obvious discomfort from neck  Cardiovascular: Normal rate , rhythm   Pulmonary/Chest: Effort normal  Neurological: Alert and oriented .   Psychiatric: Normal mood and affect.    (+) tenderness on neck and left side of occiput    Assessment/Plan:    Musculoskeletal neck and occipital pain  Strain from recent fall  PRN OTC Tylenol, warm compress as needed  Continue Voltaren gel  See PCP for further pain management if above conservative measures do not help in 2 days    Hypertension  Well-controlled today  Continue Losartan, hydrochlorothiazide, Hydralazine  CPM    Dyslipidemia:   Last  labs done  2/2025  Continue Atorvastatin prescription   CPM     Cholesterol: 168, done on 2/5/2025.  HDL Cholesterol: 51, done on 2/5/2025.  TriGlycerides 247, done on 2/5/2025.  LDL Cholesterol: 77, done on 2/5/2025.           Type 2 Diabetes Mellitus with long term insulin use    A1C:   Lab Results   Component Value Date     (H) 08/09/2024    A1C 7.0 (A) 05/05/2025     Weight: 147 lb (lost 3 lbs since start of GLP1 12/23/24)  Wt Readings from Last 3 Encounters:   05/05/25 140 lb 12.8 oz (63.9 kg)   04/25/25 142 lb 1.6 oz (64.5 kg)   04/14/25 143 lb 6.4 oz (65 kg)        Diabetes control is stable at 7.0% Needs ongoing management and evaluation  given the chronicity of Diabetes, ongoing medication monitoring and risk for complications      Recommendations:     Continue Toujeo U300 to 40 units daily  Increase Ozempic back to 0.5 mg weekly   ~Reviewed dosing, action, risk and side effects  Decrease Novolog 100U dose 2 units for each meal    If blood sugar below 200 mg/dl - add 1 units  If blood sugar is  201-250 mg/dl - + 2  If blood sugar is 251-300 mg/dl -  + 3  If blood sugar is 301-350 mg/dl - + 4  If blood sugar > 350 add 5    Please check if you are taking Glimepiride 4 mg 1 tablet before breakfast - please reach out to me    Rx Dasiglucagon ([place]) - discussed use, when to use, storage, showed demo pen on how to use. Let family member knows where medication is at.     I will check on CGM trends on Friday if you have any lows but reminded to call for 2 episodes of BG <80 mg/dl or >200 mg/dl in a span of 1 week      Reviewed w patient pathophysiology of diabetes, clinical significance of A1c, adverse effects of suboptimal glucose control, and goals of therapy   Reviewed the A1C test, what the value reflects and the goal for the patient. Goal of 7.5%.   Reminded pt on A1C and blood sugar targets (Fasting < 130 and post prandial <180 ) and complications associated with hyperglycemia and uncontrolled DM (on AVS)   Recommended SMBG 3-4 - x daily if not using CGM   Reviewed s/s and treatment of hypoglycemia (on AVS) - Rule of 15 and Glucagon for severe hypos - [place] AVS provided    Reminded to continue with lifestyle modifications since they have positive impact on diabetes/blood sugars/health (portion control, physical activity, weight loss)   Reinforced timing and adherence with medication, self-monitoring of blood glucose and routine follow up    Recommended for  patient to follow up in Diabetes center to review carb goals, food label reading, and offer further support/guidance with exercise planning and weight loss - Pt declined at this time    The patient is asked to return in 3 month but recommended to contact DM clinic sooner if questions or concerns.    The patient indicates understanding of these issues and agrees to the plan.      Orders Placed This Encounter    POC Hemoglobin A1C     Release to patient:   Immediate       Diabetes complications & risks surveillance:   A1C/Blood pressure: as reported  above   Nephropathy screenin25 continue Losartan rx.   LIPID screenin2025. Atorvatatin rx.   Last dilated eye exam: No data recorded Reminded to see Eye Specialist,    Exam shows retinopathy? No data recorded  Date of last PHQ-2 depression screen: PHQ-2 - Date of last depression screening: 3/24/2025  Last diabetic foot exam: Last Foot Exam: 25      Code selection for this visit was based on time spent (50 min ) on date of service in preparing to see the patient, obtaining and/or reviewing separately obtained history,evaluating patient, reviewing blood glucose trends/patterns, discussing treatment options performing a medically appropriate examination, counseling and educating the patient/family/caregiver, ordering medications or testing, referring and communicating with other healthcare providers, documenting clinical information in the EHR, independently interpreting results and communicating results to the patient/family/caregiver and care coordination. Note: This time does NOT include CGM interpretation time   The risks and benefits of my recommendations, as well as other treatment options were discussed with the patient today. questions were also answered to the best of my knowledge.      Note to patient: The  Cures Act makes medical notes like these available to patients in the interest of transparency. However, be advised this is a medical document. It is intended as peer to peer communication. It is written in medical language and may contain abbreviations or verbiage that are unfamiliar. It may appear blunt or direct. Medical documents are intended to carry relevant information, facts as evident, and the clinical opinion of the practitioner.

## 2025-05-07 ENCOUNTER — TELEPHONE (OUTPATIENT)
Facility: CLINIC | Age: 75
End: 2025-05-07

## 2025-05-07 NOTE — TELEPHONE ENCOUNTER
Call with patient via language line,  ID#[id number].  Patient called to notify LEVI Reinoso she is taking Hydralazine 50 mg once daily.  She had no further questions related to diabetes medications.  Requested patient to call office with two glucose readings under 80 mg/dl in the same week.  Patient agreed to plan.

## 2025-05-07 NOTE — TELEPHONE ENCOUNTER
On LOV, patient was told to update us if she is taking glimepiride. She was not sure during office visit if she continued with it or not.

## 2025-05-08 NOTE — TELEPHONE ENCOUNTER
Contacted patient via language line, ID#[id number].  Left message to call office.  When calls back, please ask if patient is currently taking Glimepiride and notify LEVI Reinoso.

## 2025-05-21 ENCOUNTER — TELEPHONE (OUTPATIENT)
Facility: CLINIC | Age: 75
End: 2025-05-21

## 2025-05-21 NOTE — TELEPHONE ENCOUNTER
Received four boxes of Toujeo from [facility].  Left message via language line, ID# [phone] to call office.

## 2025-05-27 DIAGNOSIS — E11.9 INSULIN DEPENDENT TYPE 2 DIABETES MELLITUS (HCC): ICD-10-CM

## 2025-05-27 DIAGNOSIS — Z79.4 INSULIN DEPENDENT TYPE 2 DIABETES MELLITUS (HCC): ICD-10-CM

## 2025-05-27 RX ORDER — SEMAGLUTIDE 0.68 MG/ML
0.5 INJECTION, SOLUTION SUBCUTANEOUS WEEKLY
Qty: 9 ML | Refills: 0 | Status: SHIPPED | OUTPATIENT
Start: 2025-05-27

## 2025-05-27 NOTE — TELEPHONE ENCOUNTER
Requested Prescriptions     Pending Prescriptions Disp Refills    OZEMPIC, 0.25 OR 0.5 MG/DOSE, 2 MG/3ML Subcutaneous Solution Pen-injector [Pharmacy Med Name: Ozempic (0.25 or 0.5 MG/DOSE) 2 MG/3ML Subcutaneous Solution Pen-injector] 9 mL 0     Sig: INJECT 0.5MG  SUBCUTANEOUSLY ONCE A WEEK     Your appointments       Date & Time Appointment Department ([place])    Jul 14, 2025 12:30 PM CDT Diabetes Pump follow up with Arleth Morales APRN [facility] ([facility] [place])        Sep 24, 2025 11:30 AM CDT Exam - Established with Marga Ferreira MD [facility] ([facility])              [facility]  [facility] [place]  [address] [facility] [address]  [facility] [address]  [phone] [facility]  [address] [facility] [address]  [facility] [id number] [phone]           Last A1c value was 7% done 5/5/2025.   Last office visit: 5/5/25 - AM  Last refill: 1/29/25 - AM

## 2025-05-30 NOTE — TELEPHONE ENCOUNTER
Call placed to patient with  - went straight to voicemail - left message to call office back - patient needs to confirm if taking Glimepiride and schedule time to  Toujeo prescription.

## 2025-07-08 NOTE — TELEPHONE ENCOUNTER
Future Appointments   Date Time Provider Department Center   7/14/2025 12:30 PM Arleth Morales APRN [facility]   9/24/2025 11:30 AM [facility]

## 2025-07-14 ENCOUNTER — OFFICE VISIT (OUTPATIENT)
Facility: CLINIC | Age: 75
End: 2025-07-14
Payer: MEDICARE

## 2025-07-14 VITALS
DIASTOLIC BLOOD PRESSURE: 60 MMHG | HEART RATE: 72 BPM | HEIGHT: 61 IN | OXYGEN SATURATION: 99 % | BODY MASS INDEX: 26.55 KG/M2 | RESPIRATION RATE: 16 BRPM | WEIGHT: 140.63 LBS | SYSTOLIC BLOOD PRESSURE: 128 MMHG

## 2025-07-14 DIAGNOSIS — E11.69 HYPERLIPIDEMIA ASSOCIATED WITH TYPE 2 DIABETES MELLITUS (HCC): ICD-10-CM

## 2025-07-14 DIAGNOSIS — E78.5 HYPERLIPIDEMIA ASSOCIATED WITH TYPE 2 DIABETES MELLITUS (HCC): ICD-10-CM

## 2025-07-14 DIAGNOSIS — I15.2 HYPERTENSION ASSOCIATED WITH DIABETES (HCC): ICD-10-CM

## 2025-07-14 DIAGNOSIS — Z79.4 TYPE 2 DIABETES MELLITUS WITH HYPERGLYCEMIA, WITH LONG-TERM CURRENT USE OF INSULIN (HCC): Primary | ICD-10-CM

## 2025-07-14 DIAGNOSIS — E11.59 HYPERTENSION ASSOCIATED WITH DIABETES (HCC): ICD-10-CM

## 2025-07-14 DIAGNOSIS — E11.65 TYPE 2 DIABETES MELLITUS WITH HYPERGLYCEMIA, WITH LONG-TERM CURRENT USE OF INSULIN (HCC): Primary | ICD-10-CM

## 2025-07-14 PROCEDURE — 95251 CONT GLUC MNTR ANALYSIS I&R: CPT

## 2025-07-14 PROCEDURE — 3008F BODY MASS INDEX DOCD: CPT

## 2025-07-14 PROCEDURE — 1159F MED LIST DOCD IN RCRD: CPT

## 2025-07-14 PROCEDURE — 3078F DIAST BP <80 MM HG: CPT

## 2025-07-14 PROCEDURE — G2211 COMPLEX E/M VISIT ADD ON: HCPCS

## 2025-07-14 PROCEDURE — 1160F RVW MEDS BY RX/DR IN RCRD: CPT

## 2025-07-14 PROCEDURE — 3074F SYST BP LT 130 MM HG: CPT

## 2025-07-14 PROCEDURE — 99215 OFFICE O/P EST HI 40 MIN: CPT

## 2025-07-14 NOTE — PATIENT INSTRUCTIONS
Reducir la dosis de Toujeo U300 a 34 unidades diarias.    Continuar:  Ozempic 0.5 mg semanalmente todos los lunes  Metformina HCl 1000 mg 1 tableta dos veces al día    Novolog U100 dosis: 2 unidades antes de las comidas +  Si la glucemia es de 151-200 mg/dl +1  Si la glucemia es de 201-250 mg/dl +2  Si la glucemia es de 251-300 mg/dl - +3  Si la glucemia es de 301-350 mg/dl - +4  Si la glucemia es > 350 mg/dl, añadir 5 unidades.    Si la glucemia es de 100 mg/dl o menos y está comiendo, no administrar la dosis de Novolog.    La corrección debe realizarse solo 2 horas después de la ingesta de insulina (Novolog). Use esta escala.  Si el nivel de azúcar en michelle es de 201-250 mg/dl, agregue 2 unidades.  Si el nivel de azúcar en michelle es de 251-300 mg/dl, agregue 3 unidades.  Si el nivel de azúcar en michelle es de 301-350 mg/dl, agregue 4 unidades.  Si el nivel de azúcar en michelle es > 350, agregue 5 unidades.      Decrease Toujeo U300 to 34 units daily    Continue:  Ozempic 0.5 mg weely every Monday  Metformin Hcl 1000 mg 1 tablet twice a day    Novolog U100 dose 2 units before meals plus  If blood sugar is 151 - 200  +1  If blood sugar is 201-250 mg/dl  + 2  If blood sugar is 251-300 mg/dl -  + 3  If blood sugar is 301-350 mg/dl - + 4  If blood sugar > 350 add 5    If blood sugar is 100 and below and is eating do not dose dose Novolog    Doing correction should be 2 hours only after meal insulin (Novolog) use this scale  If blood sugar is 201-250 mg/dl  -  2 units  If blood sugar is 251-300 mg/dl -  3 units  If blood sugar is 301-350 mg/dl -  4 units  If blood sugar > 350 add 5 units    Continue Zegalogue for severe hypoglycemia or emergency    Plan de seguimiento:  Con Arleth Morales, APRN: En in 3 months      Paciente que no gregoria: Notificado el 11/3/2025    Número de teléfono de la oficina: [phone]; los teléfonos están abiertos de lunes a viernes de 8:00 a 16:00  Anjelica por visitar nuestra oficina. Esperamos  trabajar con usted para alcanzar jesus objetivos de angela. Yamilka recordatorio, si necesita reposiciones, solicítelas con anticipación para que haya tiempo suficiente para procesar la solicitud. Le pedimos que nos notifique con al menos 5 días de anticipación a la fecha de vencimiento de shamir reposición, para que haya tiempo de enviar shamir solicitud a la farmacia, procesar la reposición y asegurarse de que no haya otros problemas para obtener el medicamento (pedidos pendientes, trámites de autorización previa, etc.). Las reposiciones de rutina no se atenderán los fines de semana, por lo que le solicitamos que envíe estas solicitudes pal la semana.    La [employer] recomienda los siguientes objetivos de azúcar en michelle:     Objetivo de azúcar en michelle en ayunas (antes del desayuno):  (idealmente menos de 110)  2 horas después de comer menos de 180 (idealmente menos de 150)   Objetivo de A1c <7,0 %    Llame si tiene niveles de azúcar en michelle superiores a 180 más de 2 veces por semana  El objetivo de tiempo en rango es superior al 70 % si usa un monitor de glucosa continuo (Dexcom o Leander).  El tiempo en rango se puede encontrar en la aplicación Dexcom G7, en Clarity si usa Dexcom G6 o en LibreView si usa Leander.    Continúe con la modificación del estilo de megan y elija comidas más saludables.  Realice actividad/ejercicio de manera regular. Continúe con los ejercicios de fortalecimiento con fisioterapia.  Monitoreo frecuente de la glucosa en michelle, yamilka loren 3 veces al día, en un momento aleatorio antes de las comidas o 2 horas después de shamir comida.  Fortalezca la angela intestinal; puede consumir yogur, chucrut y probióticos, shea tenga en cuenta el contenido de azúcar y carbohidratos.   Incluya en thompson rutina actividades que le permitan liberar el estrés, yamilka yoga, natación y estiramientos, según lo tolere, que ayudarán a controlar la glucosa.    CÓMO TRATAR UN NIVEL BAJO DE  AZÚCAR EN LA MICHELLE (hipoglucemia)  Nivel bajo de azúcar en la michelle = menos de 70, o si comienza a tener síntomas (a continuación)  Síntomas: temblores, frecuencia cardíaca acelerada, hambre extrema, sudoración, confusión/dificultad para concentrarse, mareos.    Cómo tratar un nivel bajo de azúcar en la michelle si puede comer/beber: la roe del 15  1. Si está usando un monitor continuo de glucosa que indica que tiene un nivel bajo, shea no tiene ningún síntoma, verifique con shamir punción en el dedo que thompson nivel de azúcar en la michelle sea realmente bajo antes de tratarlo.   2. Coma 15 gramos de carbohidratos (zora los ejemplos a continuación)  3. Controle thompson nivel de azúcar en la michelle después de 15 minutos. Si sigue estando por debajo de thompson rango objetivo, tome otra porción.   4. Repita estos pasos hasta que esté dentro de thompson rango objetivo. Shamir vez que esté dentro del rango, si está nervioso por si thompson nivel de azúcar baja nuevamente, consuma shamir chasity de proteína (es decir, shamir cucharada de mantequilla de maní).   5. Si tiene un MCG, debe comprobar cómo ha cambiado la christina. Si la christina apunta hacia arriba o hacia un lado después de 15 minutos, espere más tiempo para que el MCG se controle O realice la prueba con shamir punción en el dedo. Intente no comer más alimentos hasta al menos 15 minutos después de la primera medición de glucosa en michelle; de lo contrario, corre el riesgo de tener un aumento repentino de la glucosa en michelle.  6. Si tiene síntomas y no puede controlar thompson glucosa en michelle por algún motivo, trate la hipoglucemia.  7. Si alguien tiene un nivel bajo de azúcar en michelle y está inconsciente: no dude en llamar al 911. Si alguien está inconsciente y no hay glucagón disponible o alguien no sabe cómo usarlo, llame al 911 de inmediato.    Para tratar un nivel bajo de azúcar en michelle, le recomiendo que lleve consigo un tratamiento fácil y pre-porcionado para niveles bajos de azúcar en michelle que  consiste en 15 g de carbohidratos:   - Puré de manzana en bolsitas para niños (alto contenido de fibra y carbohidratos que ayudan a prevenir un ezekiel demasiado alto)  - Cajita de jugo para niños pequeños: 15 g de carbohidratos --> Cajita de jugo de 4 oz  - Tabletas de glucosa de [facility], puede encontrarlas cerca de suministros para la diabetes --> Tenga en cuenta que deberá comer de 3 a 4 tabletas para llegar a 15 g de carbohidratos  - Paquete de refrigerio de fruta para niños: busque matt con 15 gramos de carbohidratos totales  - La elección de cómo tratar thompson nivel bajo es importante. Los carbohidratos complejos, o los alimentos que contienen grasas junto con carbohidratos (sari el chocolate) pueden retardar la absorción de glucosa y NO deben usarse para tratar un nivel bajo de emergencia

## 2025-07-14 NOTE — PROGRESS NOTES
Celeste Mann is a 74 year old who presents today for follow-up on the management of Type 2 diabetes.   Primary care physician: Marga Ferreira MD  Latest A1C 7.0% on 5/5/25  last A1C  9.0% 12/23/24 )   Pt speaks [place] only and is accompanied by ex-son-in-law.    Language line solution - voice Mary [phone]    Patient admits on skipping dose self-adjust insulin. For the past several appointments, pt is able to maintain same dose of long-acting and short-acting, however, does not reach if having lows. Skips Novolog or Metformin dose if having low trends and adds a Novolog dose for correction.    Pt states that she has trouble reaching out to me because she is Bhutanese speaking and occasionally gets transferred or waits too long during phone calls.     Previously, had difficulty following instructions, despite AVS in [place]. Also frequently self-adjust medications.         Reviewed CGM report/trends with patient today (Leander 3+ full download in media)  Date: 6/17/25 - 7//14/25 -> Has only 14 days of data on download  Findings:  Average glucose: 172 mg/dl (last  mg/dl)  eGMI: 7.4% (last OV 7.5%)  TIR: 65% (last OV: 63%)  Hypoglycemia: No on percentage, but on download still with low trends early morning (lowest 69, mostly 70's -90's)  Post-prandial elevation extending 4-5 hours for both lunch and dinner occasionally    Pertinent History:  Patient states she has frequent, mechanical fall. Last fall was in March and sustained head injury. She got up to the bathroom without turning on the lights. She also had a mechanical fall in October 2024. None recently since LOV.   Lives at home with daughter but dtr is not involved in her medical care.   Independent with most self-care and ADLs but unable to drive to appointments, and asks help from ex-son in law.    On Patient assistance program for Toujeo until 12/31/2025    Diabetes History:  Type 2 ~ 1990's  Seen in diabetes ctr on 12/04/2019 by Laurie but was lost to  follow-up; Reestablish w/ me on 12/23/2024  Previous A1c - 7.0% on 5/05/25, 9.0% on 12/23/24, 9.5% on 8/9/2024 , 9.8% on 8/19/24, 9.8 on 5/8/24, 10.1% 2/2/2024, 10.1% on 8/17/23, 9.5% on 2/2723, 8.7% on 9/12/2022, 9.2% on 5/25/2022, and 10% on 2/17/22 2021 - 10.8, 9.4  2020 - 9.8, 11.1,  2019 - 8.7    Patient has not had hospitalizations for blood sugar issues  denies any history of pancreatitis    Previous DM therapies:  Januvia - cost 2019  Victoza - 2019 - never started  Jardiance - itching/yeast infection    Current DM Regimen:  Toujeo U300 - 40 units daily -> dosing 42 units daily  Metformin 1000 mg once daily -> tolerating 1 tab twice a day but skips morning dose if blood sugar is low  Ozempic 0.5 mg weekly - doses every Monday    HGBA1C:    Lab Results   Component Value Date    A1C 7.0 (A) 05/05/2025    A1C 9.0 (A) 12/23/2024    A1C 9.8 (H) 08/09/2024     (H) 08/09/2024       Lab Results   Component Value Date    CHOLEST 168 02/05/2025    CHOLEST 170 02/02/2024    TRIG 247 (H) 02/05/2025    TRIG 147 02/02/2024    HDL 51 02/05/2025    HDL 70 (H) 02/02/2024    LDL 77 02/05/2025    LDL 75 02/02/2024     Lab Results   Component Value Date    MICROALBCREA 713.0 (H) 01/20/2025    MICROALBCREA 695.0 (H) 02/05/2024      Lab Results   Component Value Date    CREATSERUM 0.87 04/25/2025    CREATSERUM 1.59 (H) 02/05/2025    EGFRCR 70 04/25/2025    EGFRCR 34 (L) 02/05/2025     Lab Results   Component Value Date    AST 35 (H) 08/09/2024    AST 23 02/02/2024    ALT 23 08/09/2024    ALT 29 02/02/2024       Lab Results   Component Value Date    TSH 3.420 02/17/2022    TSH 2.720 01/28/2019    T4F 1.0 01/28/2019       DM Complications:  Microvascular:   Neuropathy: no  Retinopathy: yes  4/3/24, mild nonprolif DR, both eyes, mild mac edema OS  Nephropathy: yes (+) microalbuminuria, egfr 70 on 4/2025    Macrovascular:  PVD: no  CAD: no  Stroke/CVA: no      Modifying factors:  Medication adherence: currently  improving  Barriers: Language/Unable to go to appointments/Transpo/Family support w/ diab mx  Recent steroids, illness or infections ( past 3m): NO    Allergies: Penicillins and Jardiance [empagliflozin]    Past Medical History:    Essential hypertension    High blood pressure    High cholesterol    NAFLD (nonalcoholic fatty liver disease)    Cirrhosis, Grade 0-1 varices    Nephropathy    Type II or unspecified type diabetes mellitus without mention of complication, not stated as uncontrolled    Visual impairment    glasses     Past Surgical History:   Procedure Laterality Date    Cholecystectomy      Colonoscopy  9/9/22= Normal    Repeat PRN    Colonoscopy N/A 9/9/2022    Procedure: COLONOSCOPY;  Surgeon: Uche Oneill MD;  Location:  ENDOSCOPY    Hysterectomy  1998    Other surgical history Right     right knee repair but not a replacement    Upper gi endoscopy performed  9/9/22= Grade 0-1 varices.  Gastric Bx:     Social History     Socioeconomic History    Marital status:    Tobacco Use    Smoking status: Never    Smokeless tobacco: Never   Vaping Use    Vaping status: Never Used   Substance and Sexual Activity    Alcohol use: Not Currently    Drug use: No   Other Topics Concern    Caffeine Concern Yes    Exercise Yes     Comment: 1-2x per week     Social Drivers of Health     Food Insecurity: No Food Insecurity (6/19/2022)    Received from [facility]    Food Insecurity     Within the past 12 months, you worried that your food would run out before you got the money to buy more.: 1     Within the past 12 months, the food you bought just didn't last and you didn't have money to get more.: 1   Transportation Needs: No Transportation Needs (6/19/2022)    Received from [facility]    Transportation Needs     In the past 12 months, has lack of transportation kept you from medical appointments or from getting medications?: 2     In the past 12 months, has lack of transportation kept you from meetings,  work, or from getting things needed for daily living?: 2    Received from [facility]     Family History   Problem Relation Age of Onset    Cancer Father         stomach cancer    Diabetes Other     Diabetes Son     Diabetes Sister     Diabetes Brother      Current Medication List:   Current Outpatient Medications   Medication Sig Dispense Refill    semaglutide (OZEMPIC, 0.25 OR 0.5 MG/DOSE,) 2 MG/3ML Subcutaneous Solution Pen-injector Inject 0.5 mg into the skin once a week. 9 mL 0    hydrALAZINE 50 MG Oral Tab Take 1 tablet (50 mg total) by mouth in the morning and 1 tablet (50 mg total) before bedtime. Replaces 25 mg. 180 tablet 3    hydroCHLOROthiazide 25 MG Oral Tab Take 1 tablet (25 mg total) by mouth daily. 90 tablet 3    Insulin Glargine, 1 Unit Dial, (TOUJEO SOLOSTAR) 300 UNIT/ML Subcutaneous Solution Pen-injector INJECT 40 UNITS SUBCUTANEOUSLY IN THE MORNING AND 42 UNITS IN THE EVENING 16 mL 0    ZEGALOGUE 0.6 MG/0.6ML Subcutaneous Solution Auto-injector INJECT SUBCUTANEOUSLY AS DIRECTED. SEE ADMIN INSTRUCTIONS FOR 1 DAY. AS NEEDED FOR SEVERE HYPOGLYCEMIA.      timolol 0.5 % Ophthalmic Solution Place 1 drop into the left eye 2 (two) times daily.      insulin glargine 100 UNIT/ML Subcutaneous Solution Pen-injector Inject 40 Units into the skin nightly.      ezetimibe 10 MG Oral Tab Take 1 tablet (10 mg total) by mouth daily. 90 tablet 3    Continuous Glucose Sensor (FREESTYLE ANA PAULA 3 PLUS SENSOR) Does not apply Misc 1 each by Other route every 14 (fourteen) days. 6 each 0    insulin aspart (NOVOLOG FLEXPEN) 100 Units/mL Subcutaneous Solution Pen-injector Uses up to 20 units daily as directed in divided doses 10 mL 0    BD PEN NEEDLE ELENITA 2ND GEN 32G X 4 MM Does not apply Misc 3 injections daily 200 each 3    diclofenac (VOLTAREN) 1 % External Gel Apply 4 g topically every 6 (six) hours as needed. 1 each 2    cholecalciferol 50 MCG (2000 UT) Oral Cap Take 1 capsule (2,000 Units total) by  mouth daily.      atorvastatin 80 MG Oral Tab Take 1 tablet (80 mg total) by mouth daily. 90 tablet 3    losartan 100 MG Oral Tab Take 1 tablet (100 mg total) by mouth every evening. Replaces 50 mg 90 tablet 3    metFORMIN HCl 1000 MG Oral Tab Take 1 tablet (1,000 mg total) by mouth 2 (two) times daily with meals. 180 tablet 3    Glucose Blood (BLOOD GLUCOSE TEST) In Vitro Strip Check your blood sugar before breakfast and 2 hours after lunch. Record all your blood sugar readings and bring to your follow-up appt as discussed. 200 strip 3    Lancets 33G Does not apply Misc 1 Application 2 (two) times a day. 200 each 3    Blood Glucose Monitoring Suppl (ACCU-CHEK GUIDE ME) w/Device Does not apply Kit CHECK YOU BLOOD SUGAR BEFORE BREAKFAST AND 2 HOURS AFTER LUNCH. RECORD ALL YOUR BLOOD SUGAR READINGS AND BRING TO YOUR FOLLOW-UP APT AS DISCUSSED      Blood Glucose Monitoring Suppl Does not apply Device Check your blood sugar before breakfast and 2 hours after lunch. Record all your blood sugar readings and bring to your follow-up appt as discussed. 300 each 3    Alcohol Swabs 70 % Does not apply Pads Clean finger prior to checking blood glucose 100 each 3    Glucose Blood (ACCU-CHEK MAME PLUS) In Vitro Strip USE AS DIRECTED TO TEST BLOOD SUGARS 3 (THREE) TIMES DAILY. 300 strip 11    Blood Glucose Calibration (ACCU-CHEK MAME) In Vitro Solution USE AS DIRECTED TO CALIBRATE GLUCOSE MACHINE 1 each 0    BD ULTRA-FINE LANCETS Does not apply Misc Use 3 x daily to inject insulin. Dx E11.69, E78.2 100 each 0    BD ULTRA-FINE LANCETS Does not apply Misc Dx E11.69, E78.2  Check Blood Sugar 3 times daily 100 each 0     DM associated review of  symptoms:   Endocrine: Polyuria, polyphagia, polydipsia: no  Neurological: Paresthesias: no  HEENT: Blurred vision: (+) blurry, chronic  Skin: no rash or wounds  Hematological: Hypoglycemia: yes; see sensor     Review of Systems     Denies any weight changes or appetite changes.   LUNGS:  denies shortness of breath   CARDIOVASCULAR: denies chest pain  GI: denies abdominal pain, nausea or diarrhea or constipation  : denies dysuria  MUSCULOSKELETAL: denies pain    Physical exam:  /60 (BP Location: Right arm, Patient Position: Sitting, Cuff Size: adult)   Pulse 72   Resp 16   Ht 5' 1\" (1.549 m)   Wt 140 lb 9.6 oz (63.8 kg)   SpO2 99%   BMI 26.57 kg/m²   Body mass index is 26.57 kg/m².    Physical Exam   Vitals reviewed.   Constitutional: Normal appearance but with obvious discomfort from neck  Cardiovascular: Normal rate , rhythm   Pulmonary/Chest: Effort normal  Neurological: Alert and oriented .   Psychiatric: Normal mood and affect.    No obvious BLE swelling    Assessment/Plan:    Hypertension  Well-controlled  BP Meds: hydrALAZINE Tabs - 50 MG; hydroCHLOROthiazide Tabs - 25 MG; losartan Tabs - 100 MG    CPM    Dyslipidemia:   Last  labs done  2/2025  Cholesterol Meds: atorvastatin Tabs - 80 MG; ezetimibe Tabs - 10 MG    CPM     Cholesterol: 168, done on 2/5/2025.  HDL Cholesterol: 51, done on 2/5/2025.  TriGlycerides 247, done on 2/5/2025.  LDL Cholesterol: 77, done on 2/5/2025.     Nephropathy/Microalbuminuria  Improving eGFR in the past several months  Continue Ozempic   Continue BP and glucose control            Type 2 Diabetes Mellitus with long term insulin use    A1C:   Lab Results   Component Value Date     (H) 08/09/2024    A1C 7.0 (A) 05/05/2025     Weight: 140 lb today (w/ Ozempic start - weight at that time was 151 lbs in 12/2024) -> lost total of 11 lbs    Wt Readings from Last 3 Encounters:   07/14/25 140 lb 9.6 oz (63.8 kg)   05/05/25 140 lb 12.8 oz (63.9 kg)   04/25/25 142 lb 1.6 oz (64.5 kg)       Diabetes control is stable at 7.0% -> goal for patient    -Discussed importance of glycemic control to prevent complications of diabetes  -Discussed complications of diabetes include retinopathy, neuropathy, nephropathy and cardiovascular disease  -Discussed ABCs of  DM  -Discussed importance of low CHO diet, recommend 45gm per meal or 135gm per day    Recommendations:   Decrease Toujeo U300 to 34 units daily    Continue:  Ozempic 0.5 mg weely every Monday  Metformin Hcl 1000 mg 1 tablet twice a day    Novolog U100 dose 2 units before meals plus sliding scale  If blood sugar is 151 - 200  +1  If blood sugar is 201-250 mg/dl  + 2  If blood sugar is 251-300 mg/dl -  + 3  If blood sugar is 301-350 mg/dl - + 4  If blood sugar > 350 add 5    Doing correction should be 2 hours only after meal insulin (Novolog) use this scale  If blood sugar is 201-250 mg/dl  -  2 units  If blood sugar is 251-300 mg/dl -  3 units  If blood sugar is 301-350 mg/dl -  4 units  If blood sugar > 350 add 5 units    Continue Zegalogue for severe hypoglycemia or emergency     if you have any lows but reminded to call for 2 episodes of BG <80 mg/dl or >200 mg/dl in a span of 1 week        Reminded pt on A1C and blood sugar targets (Fasting < 130 and post prandial <180 ) and complications associated with hyperglycemia and uncontrolled DM (on AVS)   Recommended SMBG 4 - x daily if not using CGM   Reviewed s/s and treatment of hypoglycemia (on AVS) - Rule of 15 and Glucagon for severe hypos - [place] AVS provided  Reminded to continue with lifestyle modifications since they have positive impact on diabetes/blood sugars/health (portion control, physical activity, weight loss)   Reinforced timing and adherence with medication, self-monitoring of blood glucose and routine follow up    Recommended for  patient to follow up in Diabetes center to review carb goals, food label reading, and offer further support/guidance with exercise planning and weight loss - Pt declined at this time    The patient is asked to return in 3 month but recommended to contact DM clinic sooner if questions or concerns.    The patient indicates understanding of these issues and agrees to the plan.      No orders of the defined types were  placed in this encounter.      Diabetes complications & risks surveillance:   A1C/Blood pressure: as reported above   Nephropathy screenin25 continue Losartan rx.   LIPID screenin2025. Atorvatatin rx.   Last dilated eye exam: No data recorded Reminded to see Eye Specialist,    Exam shows retinopathy? No data recorded Needs update to see Eye MD  Date of last PHQ-2 depression screen: PHQ-2 - Date of last depression screening: 3/24/2025  Last diabetic foot exam: Last Foot Exam: 25      Code selection for this visit was based on time spent (50 min ) on date of service in preparing to see the patient, obtaining and/or reviewing separately obtained history,evaluating patient, reviewing blood glucose trends/patterns, discussing treatment options performing a medically appropriate examination, counseling and educating the patient/family/caregiver, ordering medications or testing, referring and communicating with other healthcare providers, documenting clinical information in the EHR, independently interpreting results and communicating results to the patient/family/caregiver and care coordination. Note: This time does NOT include CGM interpretation time   The risks and benefits of my recommendations, as well as other treatment options were discussed with the patient today. questions were also answered to the best of my knowledge.      Note to patient: The  Cures Act makes medical notes like these available to patients in the interest of transparency. However, be advised this is a medical document. It is intended as peer to peer communication. It is written in medical language and may contain abbreviations or verbiage that are unfamiliar. It may appear blunt or direct. Medical documents are intended to carry relevant information, facts as evident, and the clinical opinion of the practitioner.

## 2025-07-15 NOTE — TELEPHONE ENCOUNTER
Patient had appointment with LEVI Ortiz on 7/14/25  Future Appointments   Date Time Provider Department Center   9/24/2025 11:30 AM Marga Ferreira MD [facility]   10/13/2025  9:00 AM Arleth Morales APRN [facility]

## 2025-07-28 RX ORDER — INSULIN GLARGINE 300 U/ML
INJECTION, SOLUTION SUBCUTANEOUS
Qty: 18 ML | Refills: 0 | Status: SHIPPED | OUTPATIENT
Start: 2025-07-28

## 2025-07-28 NOTE — TELEPHONE ENCOUNTER
PROTOCOL FAILED    Requesting Insulin Glargine, 1 Unit Dial, (TOUJEO SOLOSTAR) 300 UNIT/ML Subcutaneous Solution Pen-injector   LOV: 04/14/25  RTC: 09/24/25  Last Relevant Labs: 05/05/25  Filled: 03/28/25 #16ML with 0 refills    Future Appointments   Date Time Provider Department Center   9/24/2025 11:30 AM Marga Ferreira MD [facility] [place]   10/13/2025  9:00 AM Arleth Morales, APRN [facility]

## 2025-08-04 ENCOUNTER — TELEPHONE (OUTPATIENT)
Facility: CLINIC | Age: 75
End: 2025-08-04

## 2025-08-06 ENCOUNTER — NURSE ONLY (OUTPATIENT)
Facility: CLINIC | Age: 75
End: 2025-08-06

## 2025-08-16 DIAGNOSIS — E11.65 UNCONTROLLED TYPE 2 DIABETES MELLITUS WITH HYPERGLYCEMIA (HCC): ICD-10-CM

## 2025-08-16 DIAGNOSIS — E11.29 DIABETES MELLITUS WITH ALBUMINURIA (HCC): ICD-10-CM

## 2025-08-16 DIAGNOSIS — R80.9 DIABETES MELLITUS WITH ALBUMINURIA (HCC): ICD-10-CM

## 2025-08-16 DIAGNOSIS — M80.00XD AGE-RELATED OSTEOPOROSIS WITH CURRENT PATHOLOGICAL FRACTURE WITH ROUTINE HEALING: ICD-10-CM

## 2025-08-16 DIAGNOSIS — E11.59 HYPERTENSION ASSOCIATED WITH DIABETES (HCC): Primary | ICD-10-CM

## 2025-08-16 DIAGNOSIS — E78.5 HYPERLIPIDEMIA ASSOCIATED WITH TYPE 2 DIABETES MELLITUS (HCC): ICD-10-CM

## 2025-08-16 DIAGNOSIS — Z79.4 INSULIN DEPENDENT TYPE 2 DIABETES MELLITUS (HCC): ICD-10-CM

## 2025-08-16 DIAGNOSIS — E11.9 INSULIN DEPENDENT TYPE 2 DIABETES MELLITUS (HCC): ICD-10-CM

## 2025-08-16 DIAGNOSIS — E11.69 HYPERLIPIDEMIA ASSOCIATED WITH TYPE 2 DIABETES MELLITUS (HCC): ICD-10-CM

## 2025-08-16 DIAGNOSIS — I15.2 HYPERTENSION ASSOCIATED WITH DIABETES (HCC): Primary | ICD-10-CM

## (undated) DIAGNOSIS — IMO0002 UNCONTROLLED INSULIN-TREATED TYPE 2 DIABETES MELLITUS: ICD-10-CM

## (undated) DIAGNOSIS — E11.9 TYPE 2 DIABETES MELLITUS TREATED WITH INSULIN (HCC): ICD-10-CM

## (undated) DIAGNOSIS — E11.3513 PROLIFERATIVE DIABETIC RETINOPATHY OF BOTH EYES WITH MACULAR EDEMA ASSOCIATED WITH TYPE 2 DIABETES MELLITUS (HCC): Primary | ICD-10-CM

## (undated) DIAGNOSIS — D69.6 THROMBOCYTOPENIA (HCC): Primary | ICD-10-CM

## (undated) DIAGNOSIS — D69.6 THROMBOCYTOPENIA (HCC): ICD-10-CM

## (undated) DIAGNOSIS — K76.0 FATTY LIVER: Primary | ICD-10-CM

## (undated) DIAGNOSIS — Z79.4 TYPE 2 DIABETES MELLITUS TREATED WITH INSULIN (HCC): ICD-10-CM

## (undated) DEVICE — 3M™ RED DOT™ MONITORING ELECTRODE WITH FOAM TAPE AND STICKY GEL, 50/BAG, 20/CASE, 72/PLT 2570: Brand: RED DOT™

## (undated) DEVICE — ENDOSCOPY PACK UPPER: Brand: MEDLINE INDUSTRIES, INC.

## (undated) DEVICE — 1200CC GUARDIAN II: Brand: GUARDIAN

## (undated) DEVICE — FORCEP BIOPSY RJ4 LG CAP W/ND

## (undated) DEVICE — Device: Brand: DEFENDO AIR/WATER/SUCTION AND BIOPSY VALVE

## (undated) DEVICE — FILTERLINE NASAL ADULT O2/CO2

## (undated) DEVICE — ENDOSCOPY PACK - LOWER: Brand: MEDLINE INDUSTRIES, INC.

## (undated) NOTE — Clinical Note
Initial assessment completed with patient.  Appointment scheduled for 4/14/25.  However, patient declines additional follow-up calls from nurse care manager.  Thank you!

## (undated) NOTE — LETTER
August 21, 2020      [facility] [id number]      Dear Héctor Gonsalez:    Our office has been trying to contact you to discuss your recent refill request.  Please contact our office at your earliest convenience at [phone]

## (undated) NOTE — LETTER
10/07/20        [place] [id number]      Dear Bharat Ferguson records indicate that you have outstanding lab work and or testing that was ordered for you and has not yet been completed:  Orders Placed This Enc

## (undated) NOTE — LETTER
07/11/19        [place] [id number]      Dear Sudhakar Tran records indicate that you have outstanding lab work and or testing that was ordered for you and has not yet been completed: Mammogram + Bone Densit